# Patient Record
Sex: FEMALE | Race: BLACK OR AFRICAN AMERICAN | NOT HISPANIC OR LATINO | Employment: OTHER | URBAN - METROPOLITAN AREA
[De-identification: names, ages, dates, MRNs, and addresses within clinical notes are randomized per-mention and may not be internally consistent; named-entity substitution may affect disease eponyms.]

---

## 2017-09-06 ENCOUNTER — OFFICE VISIT (OUTPATIENT)
Dept: SURGERY | Facility: CLINIC | Age: 47
End: 2017-09-06
Payer: MEDICARE

## 2017-09-06 ENCOUNTER — HOSPITAL ENCOUNTER (OUTPATIENT)
Dept: RADIOLOGY | Facility: HOSPITAL | Age: 47
Discharge: HOME | End: 2017-09-06
Attending: NURSE PRACTITIONER
Payer: MEDICARE

## 2017-09-06 VITALS
BODY MASS INDEX: 46.26 KG/M2 | HEART RATE: 77 BPM | HEIGHT: 64 IN | HEIGHT: 64 IN | DIASTOLIC BLOOD PRESSURE: 74 MMHG | SYSTOLIC BLOOD PRESSURE: 116 MMHG | WEIGHT: 271 LBS | TEMPERATURE: 98 F | BODY MASS INDEX: 49 KG/M2 | WEIGHT: 287 LBS

## 2017-09-06 DIAGNOSIS — Z12.39 SCREENING BREAST EXAMINATION: Primary | ICD-10-CM

## 2017-09-06 DIAGNOSIS — Z12.31 OTHER SCREENING MAMMOGRAM: ICD-10-CM

## 2017-09-06 DIAGNOSIS — Z12.31 VISIT FOR SCREENING MAMMOGRAM: ICD-10-CM

## 2017-09-06 PROCEDURE — 99999 PR PBB SHADOW E&M-EST. PATIENT-LVL III: CPT | Mod: PBBFAC,,, | Performed by: NURSE PRACTITIONER

## 2017-09-06 PROCEDURE — 99213 OFFICE O/P EST LOW 20 MIN: CPT | Mod: PBBFAC,PO | Performed by: NURSE PRACTITIONER

## 2017-09-06 PROCEDURE — 77067 SCR MAMMO BI INCL CAD: CPT | Mod: TC

## 2017-09-06 PROCEDURE — 77063 BREAST TOMOSYNTHESIS BI: CPT | Mod: 26,,, | Performed by: RADIOLOGY

## 2017-09-06 PROCEDURE — 99213 OFFICE O/P EST LOW 20 MIN: CPT | Mod: S$PBB,,, | Performed by: NURSE PRACTITIONER

## 2017-09-06 PROCEDURE — 77067 SCR MAMMO BI INCL CAD: CPT | Mod: 26,,, | Performed by: RADIOLOGY

## 2017-09-06 NOTE — PROGRESS NOTES
Subjective:      Patient ID: Ava Driscoll is a 46 y.o. female.    Chief Complaint: Breast Cancer Screening (CBE)      HPI: (PF, EPF - 1-3) (Detailed, Comp, - 4)  patient presents today for breast cancer screening. Denies breast pain, mass, nipple discharge, skin changed. Refer to previous clinic note for history , was scheduled for core biopsy of the right breast on 3-19-15, no mass reproducible on US and vague density on mmg which was not clearly defined for core biopsy    Last mmg 8- with no abnormality reported     Review of Systems  Objective:   Physical Exam   Pulmonary/Chest: She exhibits no mass, no tenderness, no laceration, no edema, no deformity, no swelling and no retraction. Right breast exhibits no inverted nipple, no mass, no nipple discharge, no skin change and no tenderness. Left breast exhibits no inverted nipple, no mass, no nipple discharge, no skin change and no tenderness. Breasts are symmetrical. There is no breast swelling.   Breathing non-labored, no upper extremity lymphedema   Lymphadenopathy:     She has no cervical adenopathy.     She has no axillary adenopathy.        Right: No supraclavicular adenopathy present.        Left: No supraclavicular adenopathy present.     Assessment:       1. Screening breast examination        Plan:       mmg today, results pending, if no changes or abnormality she can return in one year with screening mmg, desires continued annual f/u here  Counseled today of modifiable breast cancer risk factors and obesity.   Call for any interval palpable breast mass, pain, nipple discharge, skin changes or other breast related concerns

## 2018-01-26 ENCOUNTER — HOSPITAL ENCOUNTER (EMERGENCY)
Facility: HOSPITAL | Age: 48
Discharge: HOME | End: 2018-01-27
Attending: EMERGENCY MEDICINE
Payer: MEDICARE

## 2018-01-26 DIAGNOSIS — M53.3 SACROILIAC PAIN: Primary | ICD-10-CM

## 2018-01-26 DIAGNOSIS — M25.552 PAIN IN LEFT HIP: ICD-10-CM

## 2018-01-26 PROCEDURE — 99284 EMERGENCY DEPT VISIT MOD MDM: CPT | Mod: 25

## 2018-01-26 PROCEDURE — 96372 THER/PROPH/DIAG INJ SC/IM: CPT

## 2018-01-27 VITALS
RESPIRATION RATE: 16 BRPM | TEMPERATURE: 98 F | SYSTOLIC BLOOD PRESSURE: 138 MMHG | BODY MASS INDEX: 49.34 KG/M2 | WEIGHT: 289 LBS | HEIGHT: 64 IN | DIASTOLIC BLOOD PRESSURE: 88 MMHG | HEART RATE: 88 BPM | OXYGEN SATURATION: 99 %

## 2018-01-27 PROCEDURE — 63600175 PHARM REV CODE 636 W HCPCS: Performed by: EMERGENCY MEDICINE

## 2018-01-27 RX ORDER — MELOXICAM 15 MG/1
15 TABLET ORAL DAILY
Qty: 10 TABLET | Refills: 0 | Status: SHIPPED | OUTPATIENT
Start: 2018-01-27 | End: 2018-02-06

## 2018-01-27 RX ORDER — KETOROLAC TROMETHAMINE 30 MG/ML
30 INJECTION, SOLUTION INTRAMUSCULAR; INTRAVENOUS
Status: COMPLETED | OUTPATIENT
Start: 2018-01-27 | End: 2018-01-27

## 2018-01-27 RX ORDER — TRAMADOL HYDROCHLORIDE 50 MG/1
50 TABLET ORAL EVERY 8 HOURS PRN
Qty: 15 TABLET | Refills: 0 | Status: SHIPPED | OUTPATIENT
Start: 2018-01-27 | End: 2018-01-30

## 2018-01-27 RX ORDER — METFORMIN HYDROCHLORIDE 1000 MG/1
1000 TABLET ORAL 2 TIMES DAILY WITH MEALS
COMMUNITY
End: 2018-11-26 | Stop reason: SDUPTHER

## 2018-01-27 RX ADMIN — KETOROLAC TROMETHAMINE 30 MG: 30 INJECTION, SOLUTION INTRAMUSCULAR at 12:01

## 2018-01-27 NOTE — ED PROVIDER NOTES
Encounter Date: 2018    SCRIBE #1 NOTE: I, Joel Leekory BAL, am scribing for, and in the presence of,  Aroldo Low MD. I have scribed the following portions of the note - Other sections scribed: HPI and ROS.       History     Chief Complaint   Patient presents with    Hip Pain     Pt c/o left hip pain, unable to bear weight onset 1wk, denies injury.      CC: Hip Pain     HPI: This 47 y.o. female with schizophrenia and NIDDM presents to the ED c/o acute onset, atraumatic, right sided hip pain x1 week. Pt reports she was moping the floor when all of a sudden she began to feel a twinge in her right hip. She reports her symptoms have progressively worsened. Pt reports pain is exacerbated with exertion and movement. No prior tx attempted. No alleviating factors. Pt denies numbness, weakness, and urinary symptoms.       The history is provided by the patient. No  was used.     Review of patient's allergies indicates:  No Known Allergies  Past Medical History:   Diagnosis Date    Behavioral problem     Diabetes mellitus     History of psychiatric care     History of psychiatric hospitalization     Psychiatric problem     Schizophrenia      Past Surgical History:   Procedure Laterality Date     SECTION      HYSTERECTOMY       Family History   Problem Relation Age of Onset    Breast cancer Neg Hx     Ovarian cancer Neg Hx      Social History   Substance Use Topics    Smoking status: Never Smoker    Smokeless tobacco: Never Used    Alcohol use No     Review of Systems   Constitutional: Negative for chills and fever.   HENT: Negative for congestion, ear pain, rhinorrhea and sore throat.    Eyes: Negative for pain and visual disturbance.   Respiratory: Negative for cough and shortness of breath.    Cardiovascular: Negative for chest pain.   Gastrointestinal: Negative for abdominal pain, diarrhea, nausea and vomiting.   Genitourinary: Negative for dysuria.   Musculoskeletal:  Negative for back pain and neck pain.        (+) right hip pain   Skin: Negative for rash.   Neurological: Negative for headaches.   All other systems reviewed and are negative.      Physical Exam     Initial Vitals [01/26/18 2243]   BP Pulse Resp Temp SpO2   124/76 92 16 97.8 °F (36.6 °C) 98 %      MAP       92         Physical Exam    Nursing note and vitals reviewed.  Constitutional: She appears well-developed and well-nourished.   HENT:   Head: Normocephalic and atraumatic.   Eyes: Conjunctivae are normal. No scleral icterus.   Neck: Normal range of motion. Neck supple.   Abdominal: Soft. She exhibits no distension. There is no tenderness.   Musculoskeletal: Normal range of motion. She exhibits no edema or tenderness.        Lumbar back: Normal.        Back:    Neurological: She is alert and oriented to person, place, and time. She has normal strength.   Skin: Skin is warm and dry.   Psychiatric: She has a normal mood and affect. Thought content normal.         ED Course   Procedures  Labs Reviewed - No data to display       X-Rays:   Independently Interpreted Readings:   Other Readings:  X-ray left hip:  No acute fracture or dislocation.  Soft tissues are unremarkable.    Medical Decision Making:   History:   I obtained history from: someone other than patient.  Old Medical Records: I decided to obtain old medical records.  Old Records Summarized: other records.  Initial Assessment:   47-year-old female with diabetes and schizophrenia presents the emergency department complaining of atraumatic left lower back/left hip pain for the past 7 days - see hpi for further details.     Differential Diagnosis:   Left hip strain, sacroiliitis, and reported trauma resulting in soft tissue contusion, ligamentous injury, fracture and/or dislocation.    Independently Interpreted Test(s):   I have ordered and independently interpreted X-rays - see prior notes.  Clinical Tests:   Lab Tests: Reviewed  Radiological Study:  Reviewed and Ordered  ED Management:  Physical exam reveals an uncomfortable but otherwise healthy-appearing middle-aged female with reproducible left sacroiliac tenderness.   although she walks with a limp, she is able to ambulate with minimal difficulty.  She is currently afebrile - VS reassuring.  X-rays left hip without evidence of acute fracture, dislocation and/or cortical irregularity.  She has been given IM Toradol for symptomatic relief.  Recommend continued outpatient supportive care including daily mobic ×10 days as well as Ultram if needed for break through pain.  Return instructions have been discussed prior to discharge.              Scribe Attestation:   Scribe #1: I performed the above scribed service and the documentation accurately describes the services I performed. I attest to the accuracy of the note.    Attending Attestation:           Physician Attestation for Scribe:  Physician Attestation Statement for Scribe #1: I, Aroldo Low MD, reviewed documentation, as scribed by Joel Mac II in my presence, and it is both accurate and complete.                 ED Course      Clinical Impression:   The primary encounter diagnosis was Sacroiliac pain. A diagnosis of Pain in left hip was also pertinent to this visit.    Disposition:   Disposition: Discharged                        Aroldo Lwo MD  01/27/18 0154

## 2018-04-10 ENCOUNTER — OFFICE VISIT (OUTPATIENT)
Dept: OBSTETRICS AND GYNECOLOGY | Facility: CLINIC | Age: 48
End: 2018-04-10
Payer: MEDICARE

## 2018-04-10 VITALS — BODY MASS INDEX: 48.49 KG/M2 | WEIGHT: 284 LBS | HEIGHT: 64 IN

## 2018-04-10 DIAGNOSIS — Z90.722 HISTORY OF TOTAL HYSTERECTOMY WITH BILATERAL SALPINGO-OOPHORECTOMY (BSO): ICD-10-CM

## 2018-04-10 DIAGNOSIS — Z90.710 HISTORY OF TOTAL HYSTERECTOMY WITH BILATERAL SALPINGO-OOPHORECTOMY (BSO): ICD-10-CM

## 2018-04-10 DIAGNOSIS — Z90.79 HISTORY OF TOTAL HYSTERECTOMY WITH BILATERAL SALPINGO-OOPHORECTOMY (BSO): ICD-10-CM

## 2018-04-10 DIAGNOSIS — Z01.419 ENCOUNTER FOR GYNECOLOGICAL EXAMINATION WITHOUT ABNORMAL FINDING: Primary | ICD-10-CM

## 2018-04-10 PROCEDURE — 99212 OFFICE O/P EST SF 10 MIN: CPT | Mod: PBBFAC | Performed by: OBSTETRICS & GYNECOLOGY

## 2018-04-10 PROCEDURE — 99999 PR PBB SHADOW E&M-EST. PATIENT-LVL II: CPT | Mod: PBBFAC,,, | Performed by: OBSTETRICS & GYNECOLOGY

## 2018-04-10 PROCEDURE — G0101 CA SCREEN;PELVIC/BREAST EXAM: HCPCS | Mod: PBBFAC | Performed by: OBSTETRICS & GYNECOLOGY

## 2018-04-10 PROCEDURE — G0101 CA SCREEN;PELVIC/BREAST EXAM: HCPCS | Mod: S$PBB,,, | Performed by: OBSTETRICS & GYNECOLOGY

## 2018-04-10 RX ORDER — PALIPERIDONE PALMITATE 156 MG/ML
INJECTION INTRAMUSCULAR
COMMUNITY
Start: 2018-04-03

## 2018-04-10 RX ORDER — PRAVASTATIN SODIUM 40 MG/1
40 TABLET ORAL DAILY
COMMUNITY
Start: 2018-03-05 | End: 2018-11-26 | Stop reason: SDUPTHER

## 2018-04-10 RX ORDER — TRAMADOL HYDROCHLORIDE 50 MG/1
50 TABLET ORAL EVERY 4 HOURS PRN
COMMUNITY
Start: 2018-01-31 | End: 2018-11-26

## 2018-04-10 NOTE — PROGRESS NOTES
Subjective:       Patient ID: Ava Driscoll is a 47 y.o. female.    Chief Complaint:  Gynecologic Exam      History of Present Illness  HPI  Annual Exam-Postmenopausal  Patient presents for annual exam. The patient has no complaints today. The patient is not currently sexually active for several years. GYN screening history: last pap: patient does not recall when last pap was and pt had total hysterectomy w/ BSO done by Dr. Garcia  for benign indications (bleeding and fibroids). and last mammogram: approximate date  and was normal. The patient is not taking hormone replacement therapy. Patient denies post-menopausal vaginal bleeding. The patient wears seatbelts: no. The patient participates in regular exercise: no. Has the patient ever been transfused or tattooed?: not asked. The patient reports that there is not domestic violence in her life.                             GYN & OB History  No LMP recorded. Patient has had a hysterectomy.   Date of Last Pap: No result found    OB History    Para Term  AB Living   2 2 2         SAB TAB Ectopic Multiple Live Births                  # Outcome Date GA Lbr Ian/2nd Weight Sex Delivery Anes PTL Lv   2 Term 01 40w0d    CS-LTranv      1 Term 06/10/99 40w0d    CS-LTranv             Review of Systems  Review of Systems   Constitutional: Negative.    Respiratory: Negative.    Cardiovascular: Negative.    Gastrointestinal: Negative.    Endocrine: Negative.    Genitourinary: Negative.    Musculoskeletal: Negative.    Hematological: Negative.    Psychiatric/Behavioral: Negative.    Breast: negative.            Objective:    Physical Exam:   Constitutional: She is oriented to person, place, and time. She appears well-developed and well-nourished. No distress.    HENT:   Head: Normocephalic and atraumatic.     Neck: Normal range of motion. No thyromegaly present.    Cardiovascular: Normal rate.     Pulmonary/Chest: Effort normal. No respiratory distress.  Right breast exhibits no inverted nipple, no mass, no nipple discharge, no skin change, no tenderness, presence, no bleeding and no swelling. Left breast exhibits no inverted nipple, no mass, no nipple discharge, no skin change, no tenderness, presence, no bleeding and no swelling.        Abdominal: Soft. She exhibits no distension. There is no tenderness.     Genitourinary: Vagina normal. Pelvic exam was performed with patient supine. There is no rash, tenderness, lesion or injury on the right labia. There is no rash, tenderness, lesion or injury on the left labia. Uterus is absent. There is an absent adnexa. Right adnexum displays no tenderness. Left adnexum displays no tenderness. Labial bartholins normal.Cervix exhibits absence.           Musculoskeletal: Normal range of motion and moves all extremeties. She exhibits no tenderness.       Neurological: She is alert and oriented to person, place, and time. No cranial nerve deficit. Coordination normal.    Skin: She is not diaphoretic.    Psychiatric: She has a normal mood and affect. Her behavior is normal. Judgment and thought content normal.          Assessment:        1. Encounter for gynecological examination without abnormal finding    2. History of total hysterectomy with bilateral salpingo-oophorectomy (BSO)               Plan:      1.  Orders for MMG already placed - pt to schedule at [facility]  2.  Pt is Medicare and to be seen every other year unless having problems

## 2018-07-17 ENCOUNTER — OFFICE VISIT (OUTPATIENT)
Dept: FAMILY MEDICINE | Facility: CLINIC | Age: 48
End: 2018-07-17
Payer: MEDICARE

## 2018-07-17 VITALS
DIASTOLIC BLOOD PRESSURE: 80 MMHG | WEIGHT: 288.38 LBS | SYSTOLIC BLOOD PRESSURE: 112 MMHG | TEMPERATURE: 99 F | OXYGEN SATURATION: 97 % | BODY MASS INDEX: 49.23 KG/M2 | HEIGHT: 64 IN | HEART RATE: 95 BPM

## 2018-07-17 DIAGNOSIS — F20.0 PARANOID SCHIZOPHRENIA: ICD-10-CM

## 2018-07-17 DIAGNOSIS — J30.9 ALLERGIC RHINITIS, UNSPECIFIED SEASONALITY, UNSPECIFIED TRIGGER: Primary | ICD-10-CM

## 2018-07-17 PROCEDURE — 99999 PR PBB SHADOW E&M-EST. PATIENT-LVL III: CPT | Mod: PBBFAC,,, | Performed by: INTERNAL MEDICINE

## 2018-07-17 PROCEDURE — 99204 OFFICE O/P NEW MOD 45 MIN: CPT | Mod: S$PBB,,, | Performed by: INTERNAL MEDICINE

## 2018-07-17 PROCEDURE — 99213 OFFICE O/P EST LOW 20 MIN: CPT | Mod: PBBFAC,PO | Performed by: INTERNAL MEDICINE

## 2018-07-17 RX ORDER — GLIMEPIRIDE 2 MG/1
2 TABLET ORAL EVERY MORNING
Refills: 0 | COMMUNITY
Start: 2018-05-18 | End: 2018-11-26 | Stop reason: SDUPTHER

## 2018-07-17 RX ORDER — LISINOPRIL 2.5 MG/1
TABLET ORAL
Refills: 0 | COMMUNITY
Start: 2018-05-18 | End: 2018-11-26 | Stop reason: SDUPTHER

## 2018-07-17 RX ORDER — PROMETHAZINE HYDROCHLORIDE AND DEXTROMETHORPHAN HYDROBROMIDE 6.25; 15 MG/5ML; MG/5ML
5 SYRUP ORAL EVERY 12 HOURS PRN
Qty: 180 ML | Refills: 0 | Status: SHIPPED | OUTPATIENT
Start: 2018-07-17 | End: 2018-07-27

## 2018-07-17 RX ORDER — ERGOCALCIFEROL 1.25 MG/1
50000 CAPSULE ORAL
Refills: 0 | COMMUNITY
Start: 2018-05-18 | End: 2018-11-26

## 2018-07-17 RX ORDER — OLOPATADINE HYDROCHLORIDE 2 MG/ML
SOLUTION/ DROPS OPHTHALMIC
Refills: 0 | COMMUNITY
Start: 2018-05-18 | End: 2019-10-23

## 2018-07-17 NOTE — PROGRESS NOTES
SUBJECTIVE     Chief Complaint   Patient presents with    Establish Care    Cough     ongoing x couple weeks.     Allergies       HPI  Ava Driscoll is a 47 y.o. female with multiple medical diagnoses as listed in the medical history and problem list that presents for evaluation of cough x 3-4 weeks. Pt reports a post-nasal drip and an alternating dry/productive cough. +runny/watery eyes and sneezing. Denies any SOB, wheezing, or chest tightness. Denies any fever, chills, or night sweats. Pt has not been taking any meds. Denies any sick contacts/recent travel.    PAST MEDICAL HISTORY:  Past Medical History:   Diagnosis Date    Behavioral problem     Diabetes mellitus     History of psychiatric care     History of psychiatric hospitalization     Psychiatric problem     Schizophrenia        PAST SURGICAL HISTORY:  Past Surgical History:   Procedure Laterality Date     SECTION      HYSTERECTOMY         SOCIAL HISTORY:  Social History     Social History    Marital status: Single     Spouse name: N/A    Number of children: N/A    Years of education: N/A     Occupational History    Not on file.     Social History Main Topics    Smoking status: Never Smoker    Smokeless tobacco: Never Used    Alcohol use No    Drug use: No    Sexual activity: No     Other Topics Concern    Not on file     Social History Narrative    No narrative on file       FAMILY HISTORY:  Family History   Problem Relation Age of Onset    Arthritis Mother     Diabetes type II Father     Breast cancer Neg Hx     Ovarian cancer Neg Hx        ALLERGIES AND MEDICATIONS: updated and reviewed.  Review of patient's allergies indicates:  No Known Allergies  Current Outpatient Prescriptions   Medication Sig Dispense Refill    glimepiride (AMARYL) 2 MG tablet Take 2 mg by mouth every morning.  0    INVEGA SUSTENNA 156 mg/mL Syrg injection       lisinopril (PRINIVIL,ZESTRIL) 2.5 MG tablet take 1 tablet by mouth every morning TO  "PRESERVE KIDNEY FUNCTION  0    metFORMIN (GLUCOPHAGE) 1000 MG tablet Take 1,000 mg by mouth 2 (two) times daily with meals.      olopatadine (PATADAY) 0.2 % Drop instill 1 drop into both eyes once daily  0    paliperidone (INVEGA) 3 MG TR24 Take 3 mg by mouth once daily.      pravastatin (PRAVACHOL) 40 MG tablet Take 40 mg by mouth once daily.       traMADol (ULTRAM) 50 mg tablet Take 50 mg by mouth every 4 (four) hours as needed.       VITAMIN D2 50,000 unit capsule Take 50,000 Units by mouth every 7 days.  0    promethazine-dextromethorphan (PROMETHAZINE-DM) 6.25-15 mg/5 mL Syrp Take 5 mLs by mouth every 12 (twelve) hours as needed. 180 mL 0     No current facility-administered medications for this visit.        ROS  Review of Systems   Constitutional: Negative for chills and fever.   HENT: Positive for sneezing. Negative for hearing loss and sore throat.    Eyes: Positive for discharge. Negative for visual disturbance.   Respiratory: Positive for cough. Negative for shortness of breath.    Cardiovascular: Negative for chest pain, palpitations and leg swelling.   Gastrointestinal: Negative for abdominal pain, constipation, diarrhea, nausea and vomiting.   Genitourinary: Negative for dysuria, frequency and urgency.   Musculoskeletal: Negative for arthralgias, joint swelling and myalgias.   Skin: Negative for rash and wound.   Neurological: Negative for headaches.   Psychiatric/Behavioral: Negative for agitation and confusion. The patient is not nervous/anxious.          OBJECTIVE     Physical Exam  Vitals:    07/17/18 1358   BP: 112/80   Pulse: 95   Temp: 98.5 °F (36.9 °C)    Body mass index is 49.5 kg/m².  Weight: 130.8 kg (288 lb 5.8 oz)   Height: 5' 4" (162.6 cm)     Physical Exam   Constitutional: She is oriented to person, place, and time. She appears well-developed and well-nourished. No distress.   HENT:   Head: Normocephalic and atraumatic.   Right Ear: External ear normal.   Left Ear: Hearing, " tympanic membrane and external ear normal.   Nose: Rhinorrhea present. Right sinus exhibits no maxillary sinus tenderness and no frontal sinus tenderness. Left sinus exhibits no maxillary sinus tenderness and no frontal sinus tenderness.   Mouth/Throat: Oropharynx is clear and moist. No uvula swelling. No posterior oropharyngeal edema or posterior oropharyngeal erythema.   R ear cerumen; can not visualize TM   Eyes: Conjunctivae and EOM are normal. Right eye exhibits no discharge. Left eye exhibits no discharge. No scleral icterus.   Neck: Normal range of motion. Neck supple. No JVD present. No tracheal deviation present.   Cardiovascular: Normal rate, regular rhythm and intact distal pulses.  Exam reveals no gallop and no friction rub.    No murmur heard.  Pulmonary/Chest: Effort normal and breath sounds normal. No respiratory distress. She has no wheezes.   Abdominal: Soft. Bowel sounds are normal. She exhibits no distension and no mass. There is no tenderness. There is no rebound and no guarding.   Musculoskeletal: Normal range of motion. She exhibits no edema, tenderness or deformity.   Neurological: She is alert and oriented to person, place, and time. She exhibits normal muscle tone. Coordination normal.   Skin: Skin is warm and dry. No rash noted. No erythema.   Psychiatric: She has a normal mood and affect. Her behavior is normal. Judgment and thought content normal.         Health Maintenance       Date Due Completion Date    Foot Exam 10/19/1980 ---    Eye Exam 10/19/1980 ---    TETANUS VACCINE 10/19/1988 ---    Pneumococcal PPSV23 (Medium Risk) (1) 10/19/1988 ---    Hemoglobin A1c 07/07/2013 1/7/2013    Lipid Panel 01/07/2014 1/7/2013    Influenza Vaccine 08/01/2018 ---    Low Dose Statin 07/17/2019 7/17/2018    Mammogram 09/06/2019 9/6/2017            ASSESSMENT     47 y.o. female with     1. Allergic rhinitis, unspecified seasonality, unspecified trigger    2. Paranoid schizophrenia    3. BMI 45.0-49.9,  adult        PLAN:     1. Allergic rhinitis, unspecified seasonality, unspecified trigger  - Continue symptomatic treatment with rest, increase fluid intake, tylenol or ibuprofen PRN fever(temp >/= 100.4) or body aches. Okay to take OTC antihistamines, i.e. Bendaryl, Claritin, Allegra, etc. as needed.  - Okay to gargle with warm, salt water or use throat lozenges as needed  - promethazine-dextromethorphan (PROMETHAZINE-DM) 6.25-15 mg/5 mL Syrp; Take 5 mLs by mouth every 12 (twelve) hours as needed.  Dispense: 180 mL; Refill: 0    2. Paranoid schizophrenia  - Stable; no acute issues  - Continue management per Psych    3. BMI 45.0-49.9, adult  - Pt aware of importance of eating a prudent diet and exercising        RTC in 2 weeks as needed for any acute worsening of current condition or failure to improve; pt requested me as her PCP today and I accepted; ROSIE completed today for former PCP-Dr. Barker and Ophthalmology-Dr. Sreekanth Murphy MD  07/17/2018 2:18 PM        No Follow-up on file.

## 2018-07-26 DIAGNOSIS — E11.9 TYPE 2 DIABETES MELLITUS WITHOUT COMPLICATION: ICD-10-CM

## 2018-08-13 ENCOUNTER — LAB VISIT (OUTPATIENT)
Dept: LAB | Facility: HOSPITAL | Age: 48
End: 2018-08-13
Attending: INTERNAL MEDICINE
Payer: MEDICARE

## 2018-08-13 DIAGNOSIS — E11.9 TYPE 2 DIABETES MELLITUS WITHOUT COMPLICATION: ICD-10-CM

## 2018-08-13 LAB
CHOLEST SERPL-MCNC: 139 MG/DL
CHOLEST/HDLC SERPL: 3.8 {RATIO}
HDLC SERPL-MCNC: 37 MG/DL
HDLC SERPL: 26.6 %
LDLC SERPL CALC-MCNC: 84.4 MG/DL
NONHDLC SERPL-MCNC: 102 MG/DL
TRIGL SERPL-MCNC: 88 MG/DL

## 2018-08-13 PROCEDURE — 80061 LIPID PANEL: CPT

## 2018-08-13 PROCEDURE — 36415 COLL VENOUS BLD VENIPUNCTURE: CPT

## 2018-08-13 PROCEDURE — 83036 HEMOGLOBIN GLYCOSYLATED A1C: CPT

## 2018-08-14 LAB
ESTIMATED AVG GLUCOSE: 148 MG/DL
HBA1C MFR BLD HPLC: 6.8 %

## 2018-09-12 ENCOUNTER — HOSPITAL ENCOUNTER (OUTPATIENT)
Dept: RADIOLOGY | Facility: HOSPITAL | Age: 48
Discharge: HOME | End: 2018-09-12
Attending: NURSE PRACTITIONER
Payer: MEDICARE

## 2018-09-12 DIAGNOSIS — Z12.31 SCREENING MAMMOGRAM, ENCOUNTER FOR: ICD-10-CM

## 2018-09-12 PROCEDURE — 77067 SCR MAMMO BI INCL CAD: CPT | Mod: 26,,, | Performed by: RADIOLOGY

## 2018-09-12 PROCEDURE — 77063 BREAST TOMOSYNTHESIS BI: CPT | Mod: TC

## 2018-09-12 PROCEDURE — 77063 BREAST TOMOSYNTHESIS BI: CPT | Mod: 26,,, | Performed by: RADIOLOGY

## 2018-09-14 ENCOUNTER — TELEPHONE (OUTPATIENT)
Dept: SURGERY | Facility: CLINIC | Age: 48
End: 2018-09-14

## 2018-09-14 NOTE — TELEPHONE ENCOUNTER
Contacted the patient regarding scheduling breast exam appointment with Jennifer Vila NP.  The patient is scheduled to be seen on Wednesday 10/10/18 at 11 am.  The patient voiced understanding of appointment date and time.  Reminder letter mailed to the patient.

## 2018-10-10 ENCOUNTER — OFFICE VISIT (OUTPATIENT)
Dept: SURGERY | Facility: CLINIC | Age: 48
End: 2018-10-10
Payer: MEDICARE

## 2018-10-10 VITALS
WEIGHT: 287.25 LBS | TEMPERATURE: 99 F | BODY MASS INDEX: 49.04 KG/M2 | HEIGHT: 64 IN | HEART RATE: 95 BPM | SYSTOLIC BLOOD PRESSURE: 122 MMHG | DIASTOLIC BLOOD PRESSURE: 68 MMHG

## 2018-10-10 DIAGNOSIS — Z12.39 SCREENING BREAST EXAMINATION: Primary | ICD-10-CM

## 2018-10-10 PROCEDURE — 99213 OFFICE O/P EST LOW 20 MIN: CPT | Mod: PBBFAC,PO | Performed by: NURSE PRACTITIONER

## 2018-10-10 PROCEDURE — 99212 OFFICE O/P EST SF 10 MIN: CPT | Mod: S$PBB,,, | Performed by: NURSE PRACTITIONER

## 2018-10-10 PROCEDURE — 99999 PR PBB SHADOW E&M-EST. PATIENT-LVL III: CPT | Mod: PBBFAC,,, | Performed by: NURSE PRACTITIONER

## 2018-10-10 NOTE — PROGRESS NOTES
Subjective:      Patient ID: Ava Driscoll is a 47 y.o. female.    Chief Complaint: Breast Cancer Screening (CBE)      HPI: (PF, EPF - 1-3) (Detailed, Comp, - 4)patient presents today for breast cancer screening. Denies breast pain, mass, nipple discharge, skin changed.      Last mmg 9- with no abnormality reported       Review of Systems  Objective:   Physical Exam   Pulmonary/Chest: Right breast exhibits no inverted nipple, no mass, no nipple discharge, no skin change and no tenderness. Left breast exhibits no inverted nipple, no mass, no nipple discharge, no skin change and no tenderness. Breasts are symmetrical. There is no breast swelling.   Lymphadenopathy:     She has no cervical adenopathy.     She has no axillary adenopathy.        Right: No supraclavicular adenopathy present.        Left: No supraclavicular adenopathy present.     Assessment:       1. Screening breast examination        Plan:       Clinically TAYA  Patient desires to return here for annual screening  Return in one year with screening mmg, can see Gopal Maxwell at that time

## 2018-10-12 ENCOUNTER — TELEPHONE (OUTPATIENT)
Dept: ADMINISTRATIVE | Facility: HOSPITAL | Age: 48
End: 2018-10-12

## 2018-11-26 ENCOUNTER — OFFICE VISIT (OUTPATIENT)
Dept: FAMILY MEDICINE | Facility: CLINIC | Age: 48
End: 2018-11-26
Payer: MEDICARE

## 2018-11-26 VITALS
DIASTOLIC BLOOD PRESSURE: 86 MMHG | HEIGHT: 64 IN | SYSTOLIC BLOOD PRESSURE: 120 MMHG | BODY MASS INDEX: 49.38 KG/M2 | WEIGHT: 289.25 LBS | HEART RATE: 87 BPM | OXYGEN SATURATION: 96 % | TEMPERATURE: 98 F | RESPIRATION RATE: 16 BRPM

## 2018-11-26 DIAGNOSIS — E78.5 HYPERLIPIDEMIA LDL GOAL <100: ICD-10-CM

## 2018-11-26 DIAGNOSIS — E11.9 TYPE 2 DIABETES MELLITUS WITHOUT COMPLICATION, WITHOUT LONG-TERM CURRENT USE OF INSULIN: Primary | ICD-10-CM

## 2018-11-26 DIAGNOSIS — R09.82 PND (POST-NASAL DRIP): ICD-10-CM

## 2018-11-26 PROCEDURE — 99214 OFFICE O/P EST MOD 30 MIN: CPT | Mod: PBBFAC,PO | Performed by: PHYSICIAN ASSISTANT

## 2018-11-26 PROCEDURE — 99999 PR PBB SHADOW E&M-EST. PATIENT-LVL IV: CPT | Mod: PBBFAC,,, | Performed by: PHYSICIAN ASSISTANT

## 2018-11-26 PROCEDURE — 99213 OFFICE O/P EST LOW 20 MIN: CPT | Mod: S$PBB,,, | Performed by: PHYSICIAN ASSISTANT

## 2018-11-26 RX ORDER — LISINOPRIL 2.5 MG/1
TABLET ORAL
Qty: 90 TABLET | Refills: 1 | Status: SHIPPED | OUTPATIENT
Start: 2018-11-26 | End: 2019-03-20

## 2018-11-26 RX ORDER — METFORMIN HYDROCHLORIDE 1000 MG/1
1000 TABLET ORAL 2 TIMES DAILY WITH MEALS
Qty: 180 TABLET | Refills: 1 | Status: SHIPPED | OUTPATIENT
Start: 2018-11-26 | End: 2019-05-15 | Stop reason: SDUPTHER

## 2018-11-26 RX ORDER — BENZONATATE 100 MG/1
100 CAPSULE ORAL 3 TIMES DAILY PRN
Qty: 30 CAPSULE | Refills: 0 | Status: SHIPPED | OUTPATIENT
Start: 2018-11-26 | End: 2018-12-26

## 2018-11-26 RX ORDER — GLIMEPIRIDE 2 MG/1
2 TABLET ORAL EVERY MORNING
Qty: 90 TABLET | Refills: 1 | Status: SHIPPED | OUTPATIENT
Start: 2018-11-26 | End: 2019-05-15 | Stop reason: SDUPTHER

## 2018-11-26 RX ORDER — INSULIN PUMP SYRINGE, 3 ML
EACH MISCELLANEOUS
Qty: 1 EACH | Refills: 0 | Status: SHIPPED | OUTPATIENT
Start: 2018-11-26 | End: 2019-01-23 | Stop reason: SDUPTHER

## 2018-11-26 RX ORDER — LANCETS
EACH MISCELLANEOUS
Qty: 100 EACH | Refills: 3 | Status: SHIPPED | OUTPATIENT
Start: 2018-11-26 | End: 2019-01-23 | Stop reason: SDUPTHER

## 2018-11-26 RX ORDER — PRAVASTATIN SODIUM 40 MG/1
40 TABLET ORAL DAILY
Qty: 90 TABLET | Refills: 1 | Status: SHIPPED | OUTPATIENT
Start: 2018-11-26 | End: 2019-05-15 | Stop reason: SDUPTHER

## 2018-11-26 NOTE — PROGRESS NOTES
Subjective:       Patient ID: Ava Driscoll is a 48 y.o. female with multiple medical diagnoses as listed in the medical history and problem list that presents for Diabetes  .    Chief Complaint: Diabetes      Diabetes   She presents for her follow-up diabetic visit. She has type 2 diabetes mellitus. Onset time: dx in her 40s  There are no hypoglycemic associated symptoms. Associated symptoms include polydipsia. Pertinent negatives for diabetes include no fatigue, no foot paresthesias and no polyuria. There are no hypoglycemic complications. Pertinent negatives for diabetic complications include no heart disease, nephropathy or peripheral neuropathy. Current diabetic treatments: amaryl 2 mg and metformin 1000 BID. She has not had a previous visit with a dietitian. She never participates in exercise. An ACE inhibitor/angiotensin II receptor blocker is being taken. She does not see a podiatrist.Eye exam is not current (appt tomorrow ).     Review of Systems   Constitutional: Negative for fatigue.   Endocrine: Positive for polydipsia. Negative for polyuria.         PAST MEDICAL HISTORY:  Past Medical History:   Diagnosis Date    Behavioral problem     Diabetes mellitus     History of psychiatric care     History of psychiatric hospitalization     Psychiatric problem     Schizophrenia        SOCIAL HISTORY:  Social History     Socioeconomic History    Marital status: Single     Spouse name: Not on file    Number of children: Not on file    Years of education: Not on file    Highest education level: Not on file   Social Needs    Financial resource strain: Not on file    Food insecurity - worry: Not on file    Food insecurity - inability: Not on file    Transportation needs - medical: Not on file    Transportation needs - non-medical: Not on file   Occupational History    Not on file   Tobacco Use    Smoking status: Never Smoker    Smokeless tobacco: Never Used   Substance and Sexual Activity    Alcohol  "use: No    Drug use: No    Sexual activity: No   Other Topics Concern    Patient feels they ought to cut down on drinking/drug use Not Asked    Patient annoyed by others criticizing their drinking/drug use Not Asked    Patient has felt bad or guilty about drinking/drug use Not Asked    Patient has had a drink/used drugs as an eye opener in the AM Not Asked   Social History Narrative    Not on file       ALLERGIES AND MEDICATIONS: updated and reviewed.  Review of patient's allergies indicates:  No Known Allergies  Current Outpatient Medications   Medication Sig Dispense Refill    glimepiride (AMARYL) 2 MG tablet Take 1 tablet (2 mg total) by mouth every morning. 90 tablet 1    INVEGA SUSTENNA 156 mg/mL Syrg injection       lisinopril (PRINIVIL,ZESTRIL) 2.5 MG tablet take 1 tablet by mouth every morning TO PRESERVE KIDNEY FUNCTION 90 tablet 1    metFORMIN (GLUCOPHAGE) 1000 MG tablet Take 1 tablet (1,000 mg total) by mouth 2 (two) times daily with meals. 180 tablet 1    pravastatin (PRAVACHOL) 40 MG tablet Take 1 tablet (40 mg total) by mouth once daily. 90 tablet 1    benzonatate (TESSALON) 100 MG capsule Take 1 capsule (100 mg total) by mouth 3 (three) times daily as needed for Cough. 30 capsule 0    blood sugar diagnostic Strp Check BS once a day. 100 each 3    blood-glucose meter kit Check BS once a day. 1 each 0    lancets (LANCETS,ULTRA THIN) Misc Check BS once a day. 100 each 3    olopatadine (PATADAY) 0.2 % Drop instill 1 drop into both eyes once daily  0     No current facility-administered medications for this visit.          Objective:   /86   Pulse 87   Temp 98.3 °F (36.8 °C) (Oral)   Resp 16   Ht 5' 4" (1.626 m)   Wt 131.2 kg (289 lb 3.9 oz)   SpO2 96%   BMI 49.65 kg/m²      Physical Exam   Constitutional: She is oriented to person, place, and time. No distress.   HENT:   Head: Normocephalic and atraumatic.   Eyes: Conjunctivae and EOM are normal.   Cardiovascular: Normal rate " and regular rhythm.   Pulmonary/Chest: Effort normal and breath sounds normal.   Neurological: She is alert and oriented to person, place, and time.   Skin: Skin is warm. No erythema.           Protective Sensation (w/ 10 gram monofilament):  Right: Intact  Left: Intact    Visual Inspection:  Callus -  Bilateral and Dry Skin -  Bilateral    Pedal Pulses:   Right: Present  Left: Present    Posterior tibialis:   Right:Present  Left: Present        Assessment:       1. Type 2 diabetes mellitus without complication, without long-term current use of insulin    2. Hyperlipidemia LDL goal <100    3. PND (post-nasal drip)        Plan:       Type 2 diabetes mellitus without complication, without long-term current use of insulin  -     lancets (LANCETS,ULTRA THIN) Misc; Check BS once a day.  Dispense: 100 each; Refill: 3  -     blood-glucose meter kit; Check BS once a day.  Dispense: 1 each; Refill: 0  -     blood sugar diagnostic Strp; Check BS once a day.  Dispense: 100 each; Refill: 3  -     glimepiride (AMARYL) 2 MG tablet; Take 1 tablet (2 mg total) by mouth every morning.  Dispense: 90 tablet; Refill: 1  -     metFORMIN (GLUCOPHAGE) 1000 MG tablet; Take 1 tablet (1,000 mg total) by mouth 2 (two) times daily with meals.  Dispense: 180 tablet; Refill: 1  -     lisinopril (PRINIVIL,ZESTRIL) 2.5 MG tablet; take 1 tablet by mouth every morning TO PRESERVE KIDNEY FUNCTION  Dispense: 90 tablet; Refill: 1  doing well   Follow up 6 months     Hyperlipidemia LDL goal <100  -     pravastatin (PRAVACHOL) 40 MG tablet; Take 1 tablet (40 mg total) by mouth once daily.  Dispense: 90 tablet; Refill: 1    PND (post-nasal drip)  cetrizine or loratadine     Other orders  -     benzonatate (TESSALON) 100 MG capsule; Take 1 capsule (100 mg total) by mouth 3 (three) times daily as needed for Cough.  Dispense: 30 capsule; Refill: 0            No Follow-up on file.

## 2018-12-03 ENCOUNTER — TELEPHONE (OUTPATIENT)
Dept: ADMINISTRATIVE | Facility: HOSPITAL | Age: 48
End: 2018-12-03

## 2019-01-23 ENCOUNTER — TELEPHONE (OUTPATIENT)
Dept: FAMILY MEDICINE | Facility: CLINIC | Age: 49
End: 2019-01-23

## 2019-01-23 DIAGNOSIS — E11.9 TYPE 2 DIABETES MELLITUS WITHOUT COMPLICATION, WITHOUT LONG-TERM CURRENT USE OF INSULIN: ICD-10-CM

## 2019-01-23 RX ORDER — INSULIN PUMP SYRINGE, 3 ML
EACH MISCELLANEOUS
Qty: 1 EACH | Refills: 0 | Status: SHIPPED | OUTPATIENT
Start: 2019-01-23 | End: 2020-04-28 | Stop reason: SDUPTHER

## 2019-01-23 RX ORDER — LANCETS
EACH MISCELLANEOUS
Qty: 200 EACH | Refills: 3 | Status: SHIPPED | OUTPATIENT
Start: 2019-01-23 | End: 2020-04-28 | Stop reason: SDUPTHER

## 2019-01-23 NOTE — TELEPHONE ENCOUNTER
----- Message from Kiesha Shay sent at 1/23/2019  2:21 PM CST -----  Contact: Alyce wynn [facility] - [phone]  Rep requesting prescription for  Diabetic strip, lancets, and Machine.

## 2019-03-20 ENCOUNTER — OFFICE VISIT (OUTPATIENT)
Dept: FAMILY MEDICINE | Facility: CLINIC | Age: 49
End: 2019-03-20
Payer: MEDICARE

## 2019-03-20 ENCOUNTER — LAB VISIT (OUTPATIENT)
Dept: LAB | Facility: HOSPITAL | Age: 49
End: 2019-03-20
Attending: INTERNAL MEDICINE
Payer: MEDICARE

## 2019-03-20 VITALS
SYSTOLIC BLOOD PRESSURE: 122 MMHG | WEIGHT: 291.25 LBS | TEMPERATURE: 99 F | HEART RATE: 95 BPM | BODY MASS INDEX: 49.72 KG/M2 | HEIGHT: 64 IN | DIASTOLIC BLOOD PRESSURE: 78 MMHG | RESPIRATION RATE: 18 BRPM | OXYGEN SATURATION: 97 %

## 2019-03-20 DIAGNOSIS — R05.8 COUGH DUE TO ACE INHIBITOR: Primary | ICD-10-CM

## 2019-03-20 DIAGNOSIS — E11.65 TYPE 2 DIABETES MELLITUS WITH HYPERGLYCEMIA, WITHOUT LONG-TERM CURRENT USE OF INSULIN: ICD-10-CM

## 2019-03-20 DIAGNOSIS — T46.4X5A COUGH DUE TO ACE INHIBITOR: Primary | ICD-10-CM

## 2019-03-20 DIAGNOSIS — F20.0 PARANOID SCHIZOPHRENIA: ICD-10-CM

## 2019-03-20 LAB
BASOPHILS # BLD AUTO: 0.02 K/UL
BASOPHILS NFR BLD: 0.2 %
DIFFERENTIAL METHOD: ABNORMAL
EOSINOPHIL # BLD AUTO: 0.2 K/UL
EOSINOPHIL NFR BLD: 1.6 %
ERYTHROCYTE [DISTWIDTH] IN BLOOD BY AUTOMATED COUNT: 15.6 %
HCT VFR BLD AUTO: 39.5 %
HGB BLD-MCNC: 12 G/DL
LYMPHOCYTES # BLD AUTO: 3 K/UL
LYMPHOCYTES NFR BLD: 29.8 %
MCH RBC QN AUTO: 27.7 PG
MCHC RBC AUTO-ENTMCNC: 30.4 G/DL
MCV RBC AUTO: 91 FL
MONOCYTES # BLD AUTO: 0.6 K/UL
MONOCYTES NFR BLD: 5.7 %
NEUTROPHILS # BLD AUTO: 6.4 K/UL
NEUTROPHILS NFR BLD: 62.7 %
PLATELET # BLD AUTO: 377 K/UL
PMV BLD AUTO: 9 FL
PTH-INTACT SERPL-MCNC: 114.6 PG/ML
RBC # BLD AUTO: 4.33 M/UL
WBC # BLD AUTO: 10.16 K/UL

## 2019-03-20 PROCEDURE — 99214 PR OFFICE/OUTPT VISIT, EST, LEVL IV, 30-39 MIN: ICD-10-PCS | Mod: S$PBB,,, | Performed by: INTERNAL MEDICINE

## 2019-03-20 PROCEDURE — 85025 COMPLETE CBC W/AUTO DIFF WBC: CPT

## 2019-03-20 PROCEDURE — 99213 OFFICE O/P EST LOW 20 MIN: CPT | Mod: PBBFAC,PO | Performed by: INTERNAL MEDICINE

## 2019-03-20 PROCEDURE — 36415 COLL VENOUS BLD VENIPUNCTURE: CPT | Mod: PO

## 2019-03-20 PROCEDURE — 99999 PR PBB SHADOW E&M-EST. PATIENT-LVL III: CPT | Mod: PBBFAC,,, | Performed by: INTERNAL MEDICINE

## 2019-03-20 PROCEDURE — 82306 VITAMIN D 25 HYDROXY: CPT

## 2019-03-20 PROCEDURE — 99999 PR PBB SHADOW E&M-EST. PATIENT-LVL III: ICD-10-PCS | Mod: PBBFAC,,, | Performed by: INTERNAL MEDICINE

## 2019-03-20 PROCEDURE — 83970 ASSAY OF PARATHORMONE: CPT

## 2019-03-20 PROCEDURE — 99214 OFFICE O/P EST MOD 30 MIN: CPT | Mod: S$PBB,,, | Performed by: INTERNAL MEDICINE

## 2019-03-20 RX ORDER — LOSARTAN POTASSIUM 25 MG/1
12.5 TABLET ORAL DAILY
Qty: 90 TABLET | Refills: 0 | Status: SHIPPED | OUTPATIENT
Start: 2019-03-20 | End: 2019-04-25 | Stop reason: SDUPTHER

## 2019-03-20 NOTE — PROGRESS NOTES
SUBJECTIVE     Chief Complaint   Patient presents with    Medication Problem       HPI  Ava Driscoll is a 48 y.o. female with multiple medical diagnoses as listed in the medical history and problem list that presents for follow-up for persistent dry cough x 8 months. Pt saw that cough was a side effect of the Lisinopril she has been on for quite some time, but tried to put with it. She took her last dosage of Lisinopril yesterday, but reports she can no longer tolerate the cough and request to change to Losartan. Pt is without any other complaints today.     PAST MEDICAL HISTORY:  Past Medical History:   Diagnosis Date    Behavioral problem     Diabetes mellitus     History of psychiatric care     History of psychiatric hospitalization     Psychiatric problem     Schizophrenia        PAST SURGICAL HISTORY:  Past Surgical History:   Procedure Laterality Date     SECTION      HYSTERECTOMY         SOCIAL HISTORY:  Social History     Socioeconomic History    Marital status: Single     Spouse name: Not on file    Number of children: Not on file    Years of education: Not on file    Highest education level: Not on file   Social Needs    Financial resource strain: Not on file    Food insecurity - worry: Not on file    Food insecurity - inability: Not on file    Transportation needs - medical: Not on file    Transportation needs - non-medical: Not on file   Occupational History    Not on file   Tobacco Use    Smoking status: Never Smoker    Smokeless tobacco: Never Used   Substance and Sexual Activity    Alcohol use: No    Drug use: No    Sexual activity: No   Other Topics Concern    Patient feels they ought to cut down on drinking/drug use Not Asked    Patient annoyed by others criticizing their drinking/drug use Not Asked    Patient has felt bad or guilty about drinking/drug use Not Asked    Patient has had a drink/used drugs as an eye opener in the AM Not Asked   Social History  Narrative    Not on file       FAMILY HISTORY:  Family History   Problem Relation Age of Onset    Arthritis Mother     Diabetes type II Father     Breast cancer Maternal Aunt 80    Ovarian cancer Neg Hx        ALLERGIES AND MEDICATIONS: updated and reviewed.  Review of patient's allergies indicates:   Allergen Reactions    Ace inhibitors      Cough     Current Outpatient Medications   Medication Sig Dispense Refill    blood sugar diagnostic Strp Check BS once a day. 200 each 3    blood-glucose meter kit Check BS once a day. 1 each 0    glimepiride (AMARYL) 2 MG tablet Take 1 tablet (2 mg total) by mouth every morning. 90 tablet 1    INVEGA SUSTENNA 156 mg/mL Syrg injection       lancets (LANCETS,ULTRA THIN) Misc Check BS once a day. 200 each 3    metFORMIN (GLUCOPHAGE) 1000 MG tablet Take 1 tablet (1,000 mg total) by mouth 2 (two) times daily with meals. 180 tablet 1    olopatadine (PATADAY) 0.2 % Drop instill 1 drop into both eyes once daily  0    pravastatin (PRAVACHOL) 40 MG tablet Take 1 tablet (40 mg total) by mouth once daily. 90 tablet 1    losartan (COZAAR) 25 MG tablet Take 0.5 tablets (12.5 mg total) by mouth once daily. 90 tablet 0     No current facility-administered medications for this visit.        ROS  Review of Systems   Constitutional: Negative for chills and fever.   HENT: Negative for hearing loss and sore throat.    Eyes: Negative for visual disturbance.   Respiratory: Negative for cough and shortness of breath.    Cardiovascular: Negative for chest pain, palpitations and leg swelling.   Gastrointestinal: Negative for abdominal pain, constipation, diarrhea, nausea and vomiting.   Genitourinary: Negative for dysuria, frequency and urgency.   Musculoskeletal: Negative for arthralgias, joint swelling and myalgias.   Skin: Negative for rash and wound.   Neurological: Negative for headaches.   Psychiatric/Behavioral: Negative for agitation and confusion. The patient is not  "nervous/anxious.          OBJECTIVE     Physical Exam  Vitals:    03/20/19 1018   BP: 122/78   Pulse: 95   Resp: 18   Temp: 98.6 °F (37 °C)    Body mass index is 49.99 kg/m².  Weight: 132.1 kg (291 lb 3.6 oz)   Height: 5' 4" (162.6 cm)     Physical Exam   Constitutional: She is oriented to person, place, and time. She appears well-developed and well-nourished. No distress.   HENT:   Head: Normocephalic and atraumatic.   Right Ear: External ear normal.   Left Ear: External ear normal.   Nose: Nose normal.   Mouth/Throat: Oropharynx is clear and moist.   Eyes: Conjunctivae and EOM are normal. Right eye exhibits no discharge. Left eye exhibits no discharge. No scleral icterus.   Neck: Normal range of motion. Neck supple. No JVD present. No tracheal deviation present.   Cardiovascular: Normal rate, regular rhythm and intact distal pulses. Exam reveals no gallop and no friction rub.   No murmur heard.  Pulmonary/Chest: Effort normal and breath sounds normal. No respiratory distress. She has no wheezes.   Abdominal: Soft. Bowel sounds are normal. She exhibits no distension and no mass. There is no tenderness. There is no rebound and no guarding.   Musculoskeletal: Normal range of motion. She exhibits no edema, tenderness or deformity.   Neurological: She is alert and oriented to person, place, and time. She exhibits normal muscle tone. Coordination normal.   Skin: Skin is warm and dry. No rash noted. No erythema.   Psychiatric: She has a normal mood and affect. Her behavior is normal. Judgment and thought content normal.         Health Maintenance       Date Due Completion Date    TETANUS VACCINE 10/19/1988 ---    Pneumococcal Vaccine (Medium Risk) (1 of 1 - PPSV23) 10/19/1989 ---    Influenza Vaccine 08/01/2018 ---    Hemoglobin A1c 02/13/2019 8/13/2018    Lipid Panel 08/13/2019 8/13/2018    Eye Exam 09/27/2019 9/27/2018    Low Dose Statin 11/26/2019 11/26/2018    Foot Exam 11/26/2019 11/26/2018    Override on " 11/26/2018: Done    Mammogram 09/12/2020 9/12/2018            ASSESSMENT     48 y.o. female with     1. Cough due to ACE inhibitor    2. Type 2 diabetes mellitus with hyperglycemia, without long-term current use of insulin    3. Paranoid schizophrenia    4. BMI 45.0-49.9, adult        PLAN:     1. Cough due to ACE inhibitor  - Will discontinue Lisinopril and place in allergies as an adverse reaction so as to avoid in the future    2. Type 2 diabetes mellitus with hyperglycemia, without long-term current use of insulin  - Stable; no acute issues  - Check labs today and continue current meds  - losartan (COZAAR) 25 MG tablet; Take 0.5 tablets (12.5 mg total) by mouth once daily.  Dispense: 90 tablet; Refill: 0  - CBC auto differential; Future  - Vitamin D; Future  - PTH, intact; Future  - Microalbumin/creatinine urine ratio    3. Paranoid schizophrenia  - Stable; no acute issues  - Continue management per [facility]    4. BMI 45.0-49.9, adult  - Pt aware of importance of eating a prudent diet and exercising  - Vitamin D; Future        RTC in 6 months     Keerthi Murphy MD  03/20/2019 10:28 AM        No Follow-up on file.

## 2019-03-21 DIAGNOSIS — N25.81 SECONDARY HYPERPARATHYROIDISM: ICD-10-CM

## 2019-03-21 DIAGNOSIS — E55.9 VITAMIN D DEFICIENCY: ICD-10-CM

## 2019-03-21 LAB — 25(OH)D3+25(OH)D2 SERPL-MCNC: 10 NG/ML

## 2019-03-21 RX ORDER — ERGOCALCIFEROL 1.25 MG/1
50000 CAPSULE ORAL
Qty: 12 CAPSULE | Refills: 1 | Status: SHIPPED | OUTPATIENT
Start: 2019-03-21 | End: 2019-03-21 | Stop reason: SDUPTHER

## 2019-03-21 RX ORDER — ERGOCALCIFEROL 1.25 MG/1
50000 CAPSULE ORAL
Qty: 12 CAPSULE | Refills: 1 | Status: SHIPPED | OUTPATIENT
Start: 2019-03-21 | End: 2019-04-20

## 2019-03-21 NOTE — TELEPHONE ENCOUNTER
----- Message from Misa Christy sent at 3/21/2019 12:51 PM CDT -----  Contact: Self   Pt would like her meds to be sent to [facility] [place] [address] [facility] [phone]

## 2019-03-25 ENCOUNTER — TELEPHONE (OUTPATIENT)
Dept: FAMILY MEDICINE | Facility: CLINIC | Age: 49
End: 2019-03-25

## 2019-03-25 NOTE — TELEPHONE ENCOUNTER
Patient advised that medications - losartan and vitamin D2 has been sent to [facility].  Patient verbalized understanding.     Patient will call back if needed.

## 2019-03-25 NOTE — TELEPHONE ENCOUNTER
Spoke with pt and she states that she got a call from the Pharmacy and her medication is ready. No other concerns voiced at this time.

## 2019-03-25 NOTE — TELEPHONE ENCOUNTER
----- Message from Maryanne Crain sent at 3/25/2019 12:08 PM CDT -----  Contact: pt  States her medications are at the wrong pharmacy. Pt uses     [facility] - [address] [address]  Phone: [phone] Fax: [phone]    Please call pt at [phone]. Thank you

## 2019-04-25 DIAGNOSIS — E11.65 TYPE 2 DIABETES MELLITUS WITH HYPERGLYCEMIA, WITHOUT LONG-TERM CURRENT USE OF INSULIN: ICD-10-CM

## 2019-04-25 RX ORDER — LOSARTAN POTASSIUM 25 MG/1
12.5 TABLET ORAL DAILY
Qty: 90 TABLET | Refills: 0 | Status: SHIPPED | OUTPATIENT
Start: 2019-04-25 | End: 2019-05-03 | Stop reason: SDUPTHER

## 2019-04-25 NOTE — TELEPHONE ENCOUNTER
----- Message from Vero Wick sent at 4/25/2019  2:50 PM CDT -----  Contact: [phone]  Type: Patient Call Back    Who called: pt    What is the request in detail: Pt is requesting to speak to you regarding her losartan that you had put her on, she was suppose to take a 1/2 pill a day, she had been taking 1 whole pill. Pls call pt [phone].     Best call back number: [phone]    Thanks.......BRODY

## 2019-04-25 NOTE — TELEPHONE ENCOUNTER
Patient requesting a refill of medication.  Stated she was taking 1 tablet instead of 1/2 tablet.  Please advise.     LOV  3/20/2019  Last refill  3/20/2019

## 2019-05-03 DIAGNOSIS — E11.65 TYPE 2 DIABETES MELLITUS WITH HYPERGLYCEMIA, WITHOUT LONG-TERM CURRENT USE OF INSULIN: ICD-10-CM

## 2019-05-03 RX ORDER — LOSARTAN POTASSIUM 25 MG/1
12.5 TABLET ORAL DAILY
Qty: 90 TABLET | Refills: 0 | Status: SHIPPED | OUTPATIENT
Start: 2019-05-03 | End: 2019-05-27 | Stop reason: SDUPTHER

## 2019-05-03 NOTE — TELEPHONE ENCOUNTER
----- Message from Kassy Shook sent at 5/3/2019  2:34 PM CDT -----  Contact: Self/  [phone]  Type: RX Refill Request    Who Called: Patient    Refill or New Rx:  Refill    RX Name and Strength:  losartan (COZAAR) 25 MG tablet    How is the patient currently taking it? (ex. 1XDay): 0.5X Day    Is this a 30 day or 90 day RX:  90 day    Preferred Pharmacy with phone number:  [facility] [place] [address]    Local or Mail Order:  Local      Would the patient rather a call back or a response via [facility]?  Call Back    Best Call Back Number:  [phone]

## 2019-05-13 ENCOUNTER — PATIENT OUTREACH (OUTPATIENT)
Dept: ADMINISTRATIVE | Facility: HOSPITAL | Age: 49
End: 2019-05-13

## 2019-05-15 DIAGNOSIS — E78.5 HYPERLIPIDEMIA LDL GOAL <100: ICD-10-CM

## 2019-05-15 DIAGNOSIS — E11.9 TYPE 2 DIABETES MELLITUS WITHOUT COMPLICATION, WITHOUT LONG-TERM CURRENT USE OF INSULIN: ICD-10-CM

## 2019-05-15 RX ORDER — GLIMEPIRIDE 2 MG/1
TABLET ORAL
Qty: 90 TABLET | Refills: 1 | Status: SHIPPED | OUTPATIENT
Start: 2019-05-15 | End: 2019-05-27 | Stop reason: SDUPTHER

## 2019-05-15 RX ORDER — LISINOPRIL 2.5 MG/1
TABLET ORAL
Qty: 90 TABLET | Refills: 1 | OUTPATIENT
Start: 2019-05-15

## 2019-05-15 RX ORDER — METFORMIN HYDROCHLORIDE 1000 MG/1
TABLET ORAL
Qty: 180 TABLET | Refills: 1 | Status: SHIPPED | OUTPATIENT
Start: 2019-05-15 | End: 2019-05-27 | Stop reason: SDUPTHER

## 2019-05-15 RX ORDER — PRAVASTATIN SODIUM 40 MG/1
TABLET ORAL
Qty: 90 TABLET | Refills: 1 | Status: SHIPPED | OUTPATIENT
Start: 2019-05-15 | End: 2019-05-27 | Stop reason: SDUPTHER

## 2019-05-22 DIAGNOSIS — E11.9 TYPE 2 DIABETES MELLITUS WITHOUT COMPLICATION, WITHOUT LONG-TERM CURRENT USE OF INSULIN: ICD-10-CM

## 2019-05-22 RX ORDER — LISINOPRIL 2.5 MG/1
TABLET ORAL
Qty: 90 TABLET | Refills: 1 | OUTPATIENT
Start: 2019-05-22

## 2019-05-27 ENCOUNTER — LAB VISIT (OUTPATIENT)
Dept: LAB | Facility: HOSPITAL | Age: 49
End: 2019-05-27
Attending: INTERNAL MEDICINE
Payer: MEDICARE

## 2019-05-27 ENCOUNTER — OFFICE VISIT (OUTPATIENT)
Dept: FAMILY MEDICINE | Facility: CLINIC | Age: 49
End: 2019-05-27
Payer: MEDICARE

## 2019-05-27 VITALS
DIASTOLIC BLOOD PRESSURE: 84 MMHG | TEMPERATURE: 98 F | HEART RATE: 83 BPM | OXYGEN SATURATION: 96 % | WEIGHT: 293 LBS | BODY MASS INDEX: 50.02 KG/M2 | HEIGHT: 64 IN | SYSTOLIC BLOOD PRESSURE: 126 MMHG | RESPIRATION RATE: 18 BRPM

## 2019-05-27 DIAGNOSIS — E11.65 TYPE 2 DIABETES MELLITUS WITH HYPERGLYCEMIA, WITHOUT LONG-TERM CURRENT USE OF INSULIN: Primary | ICD-10-CM

## 2019-05-27 DIAGNOSIS — E11.9 TYPE 2 DIABETES MELLITUS WITHOUT COMPLICATION, WITHOUT LONG-TERM CURRENT USE OF INSULIN: ICD-10-CM

## 2019-05-27 DIAGNOSIS — N25.81 SECONDARY HYPERPARATHYROIDISM: ICD-10-CM

## 2019-05-27 DIAGNOSIS — E78.5 HYPERLIPIDEMIA ASSOCIATED WITH TYPE 2 DIABETES MELLITUS: ICD-10-CM

## 2019-05-27 DIAGNOSIS — E11.69 HYPERLIPIDEMIA ASSOCIATED WITH TYPE 2 DIABETES MELLITUS: ICD-10-CM

## 2019-05-27 LAB
ALBUMIN SERPL BCP-MCNC: 3.2 G/DL (ref 3.5–5.2)
ALBUMIN/CREAT UR: 6.5 UG/MG (ref 0–30)
ALP SERPL-CCNC: 75 U/L (ref 55–135)
ALT SERPL W/O P-5'-P-CCNC: 13 U/L (ref 10–44)
ANION GAP SERPL CALC-SCNC: 7 MMOL/L (ref 8–16)
AST SERPL-CCNC: 12 U/L (ref 10–40)
BILIRUB SERPL-MCNC: 0.4 MG/DL (ref 0.1–1)
BUN SERPL-MCNC: 14 MG/DL (ref 6–20)
CALCIUM SERPL-MCNC: 9.1 MG/DL (ref 8.7–10.5)
CHLORIDE SERPL-SCNC: 106 MMOL/L (ref 95–110)
CO2 SERPL-SCNC: 30 MMOL/L (ref 23–29)
CREAT SERPL-MCNC: 1 MG/DL (ref 0.5–1.4)
CREAT UR-MCNC: 123 MG/DL (ref 15–325)
EST. GFR  (AFRICAN AMERICAN): >60 ML/MIN/1.73 M^2
EST. GFR  (NON AFRICAN AMERICAN): >60 ML/MIN/1.73 M^2
ESTIMATED AVG GLUCOSE: 157 MG/DL (ref 68–131)
GLUCOSE SERPL-MCNC: 165 MG/DL (ref 70–110)
HBA1C MFR BLD HPLC: 7.1 % (ref 4–5.6)
MICROALBUMIN UR DL<=1MG/L-MCNC: 8 UG/ML
POTASSIUM SERPL-SCNC: 4.7 MMOL/L (ref 3.5–5.1)
PROT SERPL-MCNC: 7.3 G/DL (ref 6–8.4)
SODIUM SERPL-SCNC: 143 MMOL/L (ref 136–145)

## 2019-05-27 PROCEDURE — 83036 HEMOGLOBIN GLYCOSYLATED A1C: CPT

## 2019-05-27 PROCEDURE — 99999 PR PBB SHADOW E&M-EST. PATIENT-LVL III: ICD-10-PCS | Mod: PBBFAC,,, | Performed by: INTERNAL MEDICINE

## 2019-05-27 PROCEDURE — 99214 PR OFFICE/OUTPT VISIT, EST, LEVL IV, 30-39 MIN: ICD-10-PCS | Mod: S$PBB,,, | Performed by: INTERNAL MEDICINE

## 2019-05-27 PROCEDURE — 80053 COMPREHEN METABOLIC PANEL: CPT

## 2019-05-27 PROCEDURE — 99999 PR PBB SHADOW E&M-EST. PATIENT-LVL III: CPT | Mod: PBBFAC,,, | Performed by: INTERNAL MEDICINE

## 2019-05-27 PROCEDURE — 82043 UR ALBUMIN QUANTITATIVE: CPT

## 2019-05-27 PROCEDURE — 99214 OFFICE O/P EST MOD 30 MIN: CPT | Mod: S$PBB,,, | Performed by: INTERNAL MEDICINE

## 2019-05-27 PROCEDURE — 99213 OFFICE O/P EST LOW 20 MIN: CPT | Mod: PBBFAC,PO | Performed by: INTERNAL MEDICINE

## 2019-05-27 RX ORDER — LOSARTAN POTASSIUM 25 MG/1
25 TABLET ORAL DAILY
Qty: 90 TABLET | Refills: 1 | Status: SHIPPED | OUTPATIENT
Start: 2019-05-27 | End: 2019-06-04 | Stop reason: SDUPTHER

## 2019-05-27 RX ORDER — PRAVASTATIN SODIUM 40 MG/1
40 TABLET ORAL DAILY
Qty: 90 TABLET | Refills: 1 | Status: SHIPPED | OUTPATIENT
Start: 2019-05-27 | End: 2020-01-09 | Stop reason: SDUPTHER

## 2019-05-27 RX ORDER — GLIMEPIRIDE 2 MG/1
2 TABLET ORAL EVERY MORNING
Qty: 90 TABLET | Refills: 1 | Status: SHIPPED | OUTPATIENT
Start: 2019-05-27 | End: 2019-05-28

## 2019-05-27 RX ORDER — METFORMIN HYDROCHLORIDE 1000 MG/1
1000 TABLET ORAL 2 TIMES DAILY WITH MEALS
Qty: 180 TABLET | Refills: 1 | Status: SHIPPED | OUTPATIENT
Start: 2019-05-27 | End: 2020-01-09 | Stop reason: SDUPTHER

## 2019-05-27 NOTE — PROGRESS NOTES
SUBJECTIVE     Chief Complaint   Patient presents with    Medication Refill     follow up       HPI  Ava Driscoll is a 48 y.o. female with multiple medical diagnoses as listed in the medical history and problem list that presents for follow-up for DM2 with medication refill. Pt has been doing well since her last visit. She is fully compliant with meds and denies any adverse side effects. Pt reports her mother received a warning that Losartan is causing cancer, so she self-discontinued. She never received any warnings from her pharmacy. Pt reports her cough has stopped since discontinuing the Lisinopril. Otherwise, pt is without any other complaints today.     PAST MEDICAL HISTORY:  Past Medical History:   Diagnosis Date    Behavioral problem     Diabetes mellitus     History of psychiatric care     History of psychiatric hospitalization     Psychiatric problem     Schizophrenia        PAST SURGICAL HISTORY:  Past Surgical History:   Procedure Laterality Date     SECTION      HYSTERECTOMY         SOCIAL HISTORY:  Social History     Socioeconomic History    Marital status: Single     Spouse name: Not on file    Number of children: Not on file    Years of education: Not on file    Highest education level: Not on file   Occupational History    Not on file   Social Needs    Financial resource strain: Not on file    Food insecurity:     Worry: Not on file     Inability: Not on file    Transportation needs:     Medical: Not on file     Non-medical: Not on file   Tobacco Use    Smoking status: Never Smoker    Smokeless tobacco: Never Used   Substance and Sexual Activity    Alcohol use: No    Drug use: No    Sexual activity: Never   Lifestyle    Physical activity:     Days per week: Not on file     Minutes per session: Not on file    Stress: Not on file   Relationships    Social connections:     Talks on phone: Not on file     Gets together: Not on file     Attends Sikh service: Not on  file     Active member of club or organization: Not on file     Attends meetings of clubs or organizations: Not on file     Relationship status: Not on file   Other Topics Concern    Patient feels they ought to cut down on drinking/drug use Not Asked    Patient annoyed by others criticizing their drinking/drug use Not Asked    Patient has felt bad or guilty about drinking/drug use Not Asked    Patient has had a drink/used drugs as an eye opener in the AM Not Asked   Social History Narrative    Not on file       FAMILY HISTORY:  Family History   Problem Relation Age of Onset    Arthritis Mother     Diabetes type II Father     Breast cancer Maternal Aunt 80    Ovarian cancer Neg Hx        ALLERGIES AND MEDICATIONS: updated and reviewed.  Review of patient's allergies indicates:   Allergen Reactions    Ace inhibitors      Cough     Current Outpatient Medications   Medication Sig Dispense Refill    blood sugar diagnostic Strp Check BS once a day. 200 each 3    blood-glucose meter kit Check BS once a day. 1 each 0    INVEGA SUSTENNA 156 mg/mL Syrg injection       lancets (LANCETS,ULTRA THIN) Misc Check BS once a day. 200 each 3    metFORMIN (GLUCOPHAGE) 1000 MG tablet Take 1 tablet (1,000 mg total) by mouth 2 (two) times daily with meals. 180 tablet 1    pravastatin (PRAVACHOL) 40 MG tablet Take 1 tablet (40 mg total) by mouth once daily. 90 tablet 1    glimepiride (AMARYL) 2 MG tablet Take 1 tablet (2 mg total) by mouth every morning. 90 tablet 1    losartan (COZAAR) 25 MG tablet Take 1 tablet (25 mg total) by mouth once daily. 90 tablet 1    olopatadine (PATADAY) 0.2 % Drop instill 1 drop into both eyes once daily  0     No current facility-administered medications for this visit.        ROS  Review of Systems   Constitutional: Negative for chills and fever.   HENT: Negative for hearing loss and sore throat.    Eyes: Negative for visual disturbance.   Respiratory: Negative for cough and shortness of  "breath.    Cardiovascular: Negative for chest pain, palpitations and leg swelling.   Gastrointestinal: Negative for abdominal pain, constipation, diarrhea, nausea and vomiting.   Genitourinary: Negative for dysuria, frequency and urgency.   Musculoskeletal: Negative for arthralgias, joint swelling and myalgias.   Skin: Negative for rash and wound.   Neurological: Negative for headaches.   Psychiatric/Behavioral: Negative for agitation and confusion. The patient is not nervous/anxious.          OBJECTIVE     Physical Exam  Vitals:    05/27/19 0846   BP: 126/84   Pulse: 83   Resp: 18   Temp: 98.1 °F (36.7 °C)    Body mass index is 50.97 kg/m².  Weight: 134.7 kg (296 lb 15.4 oz)   Height: 5' 4" (162.6 cm)     Physical Exam   Constitutional: She is oriented to person, place, and time. She appears well-developed and well-nourished. No distress.   HENT:   Head: Normocephalic and atraumatic.   Right Ear: External ear normal.   Left Ear: External ear normal.   Nose: Nose normal.   Mouth/Throat: Oropharynx is clear and moist.   B/L cerumen in ears   Eyes: Conjunctivae and EOM are normal. Right eye exhibits no discharge. Left eye exhibits no discharge. No scleral icterus.   Neck: Normal range of motion. Neck supple. No JVD present. No tracheal deviation present.   Cardiovascular: Normal rate, regular rhythm and intact distal pulses. Exam reveals no gallop and no friction rub.   No murmur heard.  Pulmonary/Chest: Effort normal and breath sounds normal. No respiratory distress. She has no wheezes.   Abdominal: Soft. Bowel sounds are normal. She exhibits no distension and no mass. There is no tenderness. There is no rebound and no guarding.   Musculoskeletal: Normal range of motion. She exhibits no edema, tenderness or deformity.   Neurological: She is alert and oriented to person, place, and time. She exhibits normal muscle tone. Coordination normal.   Skin: Skin is warm and dry. No rash noted. No erythema.   Psychiatric: She " has a normal mood and affect. Her behavior is normal. Judgment and thought content normal.         Health Maintenance       Date Due Completion Date    TETANUS VACCINE 10/19/1988 ---    Pneumococcal Vaccine (Medium Risk) (1 of 1 - PPSV23) 10/19/1989 ---    Hemoglobin A1c 02/13/2019 8/13/2018    Influenza Vaccine 08/01/2019 ---    Lipid Panel 08/13/2019 8/13/2018    Eye Exam 09/27/2019 9/27/2018    Foot Exam 11/26/2019 11/26/2018    Override on 11/26/2018: Done    Low Dose Statin 05/15/2020 5/15/2019    Mammogram 09/12/2020 9/12/2018            ASSESSMENT     48 y.o. female with     1. Type 2 diabetes mellitus with hyperglycemia, without long-term current use of insulin    2. Secondary hyperparathyroidism    3. Hyperlipidemia associated with type 2 diabetes mellitus    4. BMI 50.0-59.9, adult        PLAN:     1. Type 2 diabetes mellitus with hyperglycemia, without long-term current use of insulin  - Stable; no acute issues  - Pt had labs today  - metFORMIN (GLUCOPHAGE) 1000 MG tablet; Take 1 tablet (1,000 mg total) by mouth 2 (two) times daily with meals.  Dispense: 180 tablet; Refill: 1  - losartan (COZAAR) 25 MG tablet; Take 1 tablet (25 mg total) by mouth once daily.  Dispense: 90 tablet; Refill: 1  - glimepiride (AMARYL) 2 MG tablet; Take 1 tablet (2 mg total) by mouth every morning.  Dispense: 90 tablet; Refill: 1    2. Secondary hyperparathyroidism  - Stable; no acute issues  - Monitor    3. Hyperlipidemia associated with type 2 diabetes mellitus  - Stable; no acute issues  - The current medical regimen is effective;  continue present plan and medications.  - pravastatin (PRAVACHOL) 40 MG tablet; Take 1 tablet (40 mg total) by mouth once daily.  Dispense: 90 tablet; Refill: 1    4. BMI 50.0-59.9, adult  - Pt aware of importance of eating a prudent diet and exercising        RTC in 6 months     Keerthi Murphy MD  05/27/2019 9:03 AM        No follow-ups on file.

## 2019-05-28 ENCOUNTER — TELEPHONE (OUTPATIENT)
Dept: FAMILY MEDICINE | Facility: CLINIC | Age: 49
End: 2019-05-28

## 2019-05-28 DIAGNOSIS — E11.65 TYPE 2 DIABETES MELLITUS WITH HYPERGLYCEMIA, WITHOUT LONG-TERM CURRENT USE OF INSULIN: Primary | ICD-10-CM

## 2019-05-28 DIAGNOSIS — E55.9 VITAMIN D DEFICIENCY: ICD-10-CM

## 2019-05-28 RX ORDER — GLIMEPIRIDE 4 MG/1
4 TABLET ORAL
Qty: 90 TABLET | Refills: 1 | Status: SHIPPED | OUTPATIENT
Start: 2019-05-28 | End: 2019-12-11 | Stop reason: SDUPTHER

## 2019-05-28 NOTE — TELEPHONE ENCOUNTER
Let pt know diabetes a bit worse  Increase amaryl from 2 mg to 4 mg. She can double up and  new dose when she is out. Thanks   Also 3 month recall into system for diabetes

## 2019-05-28 NOTE — TELEPHONE ENCOUNTER
----- Message from Keerthi Murphy MD sent at 5/28/2019  2:35 PM CDT -----  Yes, I want to increase her Glimepiride from 2 to 4 mg. I'll send it now. Thanks!    Keerthi  ----- Message -----  From: EMMETT Cantu  Sent: 5/28/2019   7:29 AM  To: Keerthi Murphy MD    Not sure if you wanted to increase anything or was it increased at yesterdays visit already

## 2019-06-04 DIAGNOSIS — E11.65 TYPE 2 DIABETES MELLITUS WITH HYPERGLYCEMIA, WITHOUT LONG-TERM CURRENT USE OF INSULIN: ICD-10-CM

## 2019-06-04 RX ORDER — LOSARTAN POTASSIUM 25 MG/1
25 TABLET ORAL DAILY
Qty: 90 TABLET | Refills: 1 | Status: SHIPPED | OUTPATIENT
Start: 2019-06-04 | End: 2020-01-17 | Stop reason: SDUPTHER

## 2019-06-04 NOTE — TELEPHONE ENCOUNTER
----- Message from Meli Jacobson sent at 6/4/2019  1:34 PM CDT -----  Contact: self  [phone]  Type: Patient Call Back    Who called: self    What is the request in detail: patient states she was given lisinopril (PRINIVIL,ZESTRIL) 2.5 MG tablet, which makes her cough, and is the wrong medication. States she needs losartan (COZAAR) 25 MG tablet instead. Please call pt in regards to this.  .  Zonia [address] [address]  Phone: [phone] Fax: [phone]    Would the patient rather a call back or a response via [facility]? Call back    Best call back number: [phone]    Additional Information: Pt would like the Lisinopril to be taken off of her medication list.

## 2019-06-04 NOTE — TELEPHONE ENCOUNTER
----- Message from Meli Jacobson sent at 6/4/2019  1:34 PM CDT -----  Contact: self  [phone]  Type: Patient Call Back    Who called: self    What is the request in detail: patient states she was given lisinopril (PRINIVIL,ZESTRIL) 2.5 MG tablet, which makes her cough, and is the wrong medication. States she needs losartan (COZAAR) 25 MG tablet instead. Please call pt in regards to this.  .  [facility] [address] [address]  Phone: [phone] Fax: [phone]    Would the patient rather a call back or a response via [facility]? Call back    Best call back number: [phone]    Additional Information: Pt would like the Lisinopril to be taken off of her medication list.

## 2019-06-04 NOTE — TELEPHONE ENCOUNTER
Patient stated she would like to have losartan refilled.  Stated lisinopril makes her cough.  Does not want to take medication and would like to have it removed from her med list.  Please advise.

## 2019-08-15 ENCOUNTER — TELEPHONE (OUTPATIENT)
Dept: SURGERY | Facility: CLINIC | Age: 49
End: 2019-08-15

## 2019-08-16 DIAGNOSIS — E11.9 TYPE 2 DIABETES MELLITUS WITHOUT COMPLICATION: ICD-10-CM

## 2019-08-19 DIAGNOSIS — E11.9 TYPE 2 DIABETES MELLITUS WITHOUT COMPLICATION, WITHOUT LONG-TERM CURRENT USE OF INSULIN: ICD-10-CM

## 2019-08-19 RX ORDER — LISINOPRIL 2.5 MG/1
TABLET ORAL
Qty: 90 TABLET | Refills: 1 | OUTPATIENT
Start: 2019-08-19

## 2019-09-09 ENCOUNTER — TELEPHONE (OUTPATIENT)
Dept: FAMILY MEDICINE | Facility: CLINIC | Age: 49
End: 2019-09-09

## 2019-09-09 DIAGNOSIS — E11.65 TYPE 2 DIABETES MELLITUS WITH HYPERGLYCEMIA, WITHOUT LONG-TERM CURRENT USE OF INSULIN: Primary | ICD-10-CM

## 2019-09-09 NOTE — TELEPHONE ENCOUNTER
----- Message from Alyce Adrien sent at 9/9/2019  2:09 PM CDT -----  Contact: Self [phone]  Type:  Patient Requesting Referral    Who Called:Self    Referral to What Specialty:Podiatry    Reason for Referral:get toe nails clipped    Does the patient want the referral with a specific physician?:Dr. Gordon    Is the specialist [facility]    Would the patient rather a call back or a response via [facility]? Call back    Best Call Back Number:[phone]

## 2019-09-24 ENCOUNTER — PATIENT OUTREACH (OUTPATIENT)
Dept: ADMINISTRATIVE | Facility: OTHER | Age: 49
End: 2019-09-24

## 2019-09-25 ENCOUNTER — OFFICE VISIT (OUTPATIENT)
Dept: SURGERY | Facility: CLINIC | Age: 49
End: 2019-09-25
Payer: MEDICARE

## 2019-09-25 ENCOUNTER — HOSPITAL ENCOUNTER (OUTPATIENT)
Dept: RADIOLOGY | Facility: HOSPITAL | Age: 49
Discharge: HOME | End: 2019-09-25
Attending: INTERNAL MEDICINE
Payer: MEDICARE

## 2019-09-25 VITALS
HEIGHT: 64 IN | DIASTOLIC BLOOD PRESSURE: 76 MMHG | BODY MASS INDEX: 50.02 KG/M2 | WEIGHT: 293 LBS | HEART RATE: 81 BPM | TEMPERATURE: 98 F | SYSTOLIC BLOOD PRESSURE: 125 MMHG

## 2019-09-25 DIAGNOSIS — Z80.3 FAMILY HISTORY OF BREAST CANCER: ICD-10-CM

## 2019-09-25 DIAGNOSIS — Z12.39 BREAST CANCER SCREENING: ICD-10-CM

## 2019-09-25 DIAGNOSIS — Z12.39 BREAST CANCER SCREENING: Primary | ICD-10-CM

## 2019-09-25 PROCEDURE — 99999 PR PBB SHADOW E&M-EST. PATIENT-LVL III: ICD-10-PCS | Mod: PBBFAC,,, | Performed by: NURSE PRACTITIONER

## 2019-09-25 PROCEDURE — 99213 OFFICE O/P EST LOW 20 MIN: CPT | Mod: PBBFAC | Performed by: NURSE PRACTITIONER

## 2019-09-25 PROCEDURE — 77063 MAMMO DIGITAL SCREENING BILAT WITH TOMOSYNTHESIS_CAD: ICD-10-PCS | Mod: 26,,, | Performed by: RADIOLOGY

## 2019-09-25 PROCEDURE — 77067 SCR MAMMO BI INCL CAD: CPT | Mod: TC,PO

## 2019-09-25 PROCEDURE — 99213 OFFICE O/P EST LOW 20 MIN: CPT | Mod: S$PBB,,, | Performed by: NURSE PRACTITIONER

## 2019-09-25 PROCEDURE — 99213 PR OFFICE/OUTPT VISIT, EST, LEVL III, 20-29 MIN: ICD-10-PCS | Mod: S$PBB,,, | Performed by: NURSE PRACTITIONER

## 2019-09-25 PROCEDURE — 77067 SCR MAMMO BI INCL CAD: CPT | Mod: 26,,, | Performed by: RADIOLOGY

## 2019-09-25 PROCEDURE — 77063 BREAST TOMOSYNTHESIS BI: CPT | Mod: 26,,, | Performed by: RADIOLOGY

## 2019-09-25 PROCEDURE — 99999 PR PBB SHADOW E&M-EST. PATIENT-LVL III: CPT | Mod: PBBFAC,,, | Performed by: NURSE PRACTITIONER

## 2019-09-25 PROCEDURE — 77067 MAMMO DIGITAL SCREENING BILAT WITH TOMOSYNTHESIS_CAD: ICD-10-PCS | Mod: 26,,, | Performed by: RADIOLOGY

## 2019-09-25 NOTE — PROGRESS NOTES
Keila:  Pt needs to RTC in 3 months to recheck right nipple to ensure no detrimental change; please place on reminder list.  Thanks.    Lukas:    Please set pt up for further imaging as I messaged you about.  Thanks.

## 2019-09-25 NOTE — PROGRESS NOTES
Patient ID: Ava Driscoll is a 48 y.o. female.    Chief Complaint: Follow-up (Annual MMG & CBE)        HPI:  Established patient presents today for breast cancer screening.    Breast History:    Personal history of breast cancer:  no  Personal history of breast biopsy:  no  Personal history of breast surgery:  no    Breast Imaging    18 bilat screening MMG report reviewed:  scattered areas of fibroglandular density  BI-RADS 1  Screening MMG in 1 year recommended  TC-7 lifetime risk 12.37%    Interval Breast History    Patient denies palpable breast mass, breast pain, breast-related skin changes, nipple itching, nipple discharge and nipple retraction.  No other breast-related concerns.     GYN History:  Age of menarche:  11 y/o   (miscarriage x 1), first live birth at 30 y/o  Uterus and ovaries:  S/p total hysterectomy at 41 y/o  Menopause:  41 y/o  HRT usage:  never    Social History:  Tobacco use:  former smoker    Other Oncologic History:  Personal history of other cancer not abovementioned:  no  Personal history of genetic testing:  no    Family Oncologic History:    Ashkenazi Rastafarian ancestry:  no  History of genetic testing in relatives:  no    Family History   Problem Relation Age of Onset    Breast cancer Maternal Aunt 80        unilat    Ovarian cancer Neg Hx          Patient's Breast Cancer Risk Assessment Scores:  Taking into account the above information, the patient's breast cancer risk assessment scores were calculated today as follows:  Tyrer-Cuzick lifetime risk:  15.6%  Shannan model 5-year risk:  1.0%      Review of Systems - See HPI.  Objective:   Physical Exam   Pulmonary/Chest: Effort normal. No respiratory distress. She exhibits no mass, no edema and no deformity. Right breast exhibits no inverted nipple, no mass, no nipple discharge, no skin change and no tenderness. Left breast exhibits no inverted nipple, no mass, no nipple discharge, no skin change and no tenderness. No breast swelling.    Right nipple with area of vague, superficial firmness, ~2-3mm in size with no associated abnormality appreciated; unable to elicit d/c from right nipple; pt states this is longstanding (for years) and is unchanged; not suggestive of pathology.  Right breast with diffuse lateral, mild TTP; no associated abnormality appreciated; not suggestive of pathology.   Genitourinary: No breast swelling.   Lymphadenopathy:     She has no cervical adenopathy.     She has no axillary adenopathy.        Right: No supraclavicular adenopathy present.        Left: No supraclavicular adenopathy present.      She has no right or left infraclavicular adenopathy.   Assessment:       1. Breast cancer screening    2. Family history of breast cancer          Counseling / Medical Decision-Making:       Encouraged breast awareness, including monthly breast self-exams.  Advised patient to RTC with any interval changes or concerns.  Questions were encouraged and answered to patient's satisfaction, and patient verbalized understanding of information and agreement with the plan.   Plan:     -Bilat screening MMG today  -RTC in 3 months to recheck right nipple to ensure no detrimental change

## 2019-09-25 NOTE — PROGRESS NOTES
Lukas,    Please call this patient, and let her know there was an asymmetry seen in her right breast on screening mammogram, for which diagnostic mammogram with possible ultrasound (if indicated) is recommended.  Direct to me any questions she may have.      Please schedule her for her imaging, and send me orders to cosign.    Thanks,  Gopal

## 2019-09-26 ENCOUNTER — TELEPHONE (OUTPATIENT)
Dept: RADIOLOGY | Facility: HOSPITAL | Age: 49
End: 2019-09-26

## 2019-09-26 NOTE — TELEPHONE ENCOUNTER
See previous note. Patient is currently in ER.    Spoke with patient and explained mammogram findings.Patient expressed understanding of results. Patient scheduled abnormal mammogram follow up appointment at [facility] on 10/2/2019.

## 2019-10-02 ENCOUNTER — HOSPITAL ENCOUNTER (OUTPATIENT)
Dept: RADIOLOGY | Facility: HOSPITAL | Age: 49
Discharge: HOME | End: 2019-10-02
Attending: NURSE PRACTITIONER
Payer: MEDICARE

## 2019-10-02 DIAGNOSIS — R92.8 ABNORMAL MAMMOGRAM: ICD-10-CM

## 2019-10-02 PROCEDURE — 77065 MAMMO DIGITAL DIAGNOSTIC RIGHT WITH TOMOSYNTHESIS_CAD: ICD-10-PCS | Mod: 26,,, | Performed by: RADIOLOGY

## 2019-10-02 PROCEDURE — 77065 DX MAMMO INCL CAD UNI: CPT | Mod: 26,,, | Performed by: RADIOLOGY

## 2019-10-02 PROCEDURE — 77065 DX MAMMO INCL CAD UNI: CPT | Mod: TC,PO

## 2019-10-02 PROCEDURE — 77061 BREAST TOMOSYNTHESIS UNI: CPT | Mod: 26,,, | Performed by: RADIOLOGY

## 2019-10-02 PROCEDURE — 77061 BREAST TOMOSYNTHESIS UNI: CPT | Mod: TC,PO

## 2019-10-02 PROCEDURE — 77061 MAMMO DIGITAL DIAGNOSTIC RIGHT WITH TOMOSYNTHESIS_CAD: ICD-10-PCS | Mod: 26,,, | Performed by: RADIOLOGY

## 2019-10-03 ENCOUNTER — TELEPHONE (OUTPATIENT)
Dept: SURGERY | Facility: CLINIC | Age: 49
End: 2019-10-03

## 2019-10-03 ENCOUNTER — PATIENT OUTREACH (OUTPATIENT)
Dept: ADMINISTRATIVE | Facility: OTHER | Age: 49
End: 2019-10-03

## 2019-10-03 NOTE — TELEPHONE ENCOUNTER
Returned call to  regarding her Breast Imaging was reported as Negative per Gopal Maxwell.NP. Patient is due for her Bilat  Mammogram in 9/26/20 . Patient to follow up in 12/25/19 for recheck Right Nipple to ensure no detrimental changes . Patient will be placed on the recall list to follow up.

## 2019-10-03 NOTE — PROGRESS NOTES
Keila:  -Pt not on portal.  Please call her, and let her know her right mammogram was reported as negative.  Direct to me any questions she may have.  -RTC around 12/25/19 to recheck right nipple to ensure no detrimental change; please call pt to schedule  -Bilat MMG due around 9/26/20; please place on reminder list

## 2019-10-14 ENCOUNTER — PATIENT OUTREACH (OUTPATIENT)
Dept: ADMINISTRATIVE | Facility: OTHER | Age: 49
End: 2019-10-14

## 2019-10-17 ENCOUNTER — OFFICE VISIT (OUTPATIENT)
Dept: PODIATRY | Facility: CLINIC | Age: 49
End: 2019-10-17
Payer: MEDICARE

## 2019-10-17 VITALS — BODY MASS INDEX: 50.02 KG/M2 | HEIGHT: 64 IN | WEIGHT: 293 LBS

## 2019-10-17 DIAGNOSIS — E11.65 TYPE 2 DIABETES MELLITUS WITH HYPERGLYCEMIA, WITHOUT LONG-TERM CURRENT USE OF INSULIN: Primary | ICD-10-CM

## 2019-10-17 DIAGNOSIS — B35.1 ONYCHOMYCOSIS DUE TO DERMATOPHYTE: ICD-10-CM

## 2019-10-17 PROCEDURE — 99203 PR OFFICE/OUTPT VISIT, NEW, LEVL III, 30-44 MIN: ICD-10-PCS | Mod: S$PBB,,, | Performed by: PODIATRIST

## 2019-10-17 PROCEDURE — 99212 OFFICE O/P EST SF 10 MIN: CPT | Mod: PBBFAC,PO | Performed by: PODIATRIST

## 2019-10-17 PROCEDURE — 99999 PR PBB SHADOW E&M-EST. PATIENT-LVL II: CPT | Mod: PBBFAC,,, | Performed by: PODIATRIST

## 2019-10-17 PROCEDURE — 99203 OFFICE O/P NEW LOW 30 MIN: CPT | Mod: S$PBB,,, | Performed by: PODIATRIST

## 2019-10-17 PROCEDURE — 99999 PR PBB SHADOW E&M-EST. PATIENT-LVL II: ICD-10-PCS | Mod: PBBFAC,,, | Performed by: PODIATRIST

## 2019-10-17 NOTE — LETTER
October 21, 2019      Keerthi Murphy MD  [address] [facility] [address]  [address]  [place] [address]           [facility]  [address]  [place] [address]  Phone: [phone]          Patient: Ava Driscoll   MR Number: [id number]   YOB: 1970   Date of Visit: 10/17/2019       Dear Dr. Keerthi Murphy:    Thank you for referring Ava Driscoll to me for evaluation. Attached you will find relevant portions of my assessment and plan of care.    If you have questions, please do not hesitate to call me. I look forward to following Ava Driscoll along with you.    Sincerely,    Lamar Laurent, DPTHOMAS    Enclosure  CC:  No Recipients    If you would like to receive this communication electronically, please contact [email or web address] or [phone] to request more information on [facility] Link access.    For providers and/or their staff who would like to refer a patient to [facility], please contact us through our one-stop-shop provider referral line, [facility] , at [phone].    If you feel you have received this communication in error or would no longer like to receive these types of communications, please e-mail [email or web address]

## 2019-10-22 NOTE — PROGRESS NOTES
Subjective:      Patient ID: Ava Driscoll is a 49 y.o. female.    Chief Complaint: Diabetic Foot Exam (last ov 5/27/2019 PCP Dr. Murphy) and Nail Care    Ava is a 49 y.o. female who presents to the clinic upon referral from Dr. Murphy  for evaluation and treatment of diabetic feet. Ava has a past medical history of Behavioral problem, Diabetes mellitus, History of psychiatric care, History of psychiatric hospitalization, Psychiatric problem, Retinopathy due to secondary diabetes, and Schizophrenia. Presents for diabetic foot risk assessment.       PCP: Keerthi Murphy MD    Date Last Seen by PCP: per above    Current shoe gear: Tennis shoes    Hemoglobin A1C   Date Value Ref Range Status   05/27/2019 7.1 (H) 4.0 - 5.6 % Final     Comment:     ADA Screening Guidelines:  5.7-6.4%  Consistent with prediabetes  >or=6.5%  Consistent with diabetes  High levels of fetal hemoglobin interfere with the HbA1C  assay. Heterozygous hemoglobin variants (HbS, HgC, etc)do  not significantly interfere with this assay.   However, presence of multiple variants may affect accuracy.     08/13/2018 6.8 (H) 4.0 - 5.6 % Final     Comment:     ADA Screening Guidelines:  5.7-6.4%  Consistent with prediabetes  >or=6.5%  Consistent with diabetes  High levels of fetal hemoglobin interfere with the HbA1C  assay. Heterozygous hemoglobin variants (HbS, HgC, etc)do  not significantly interfere with this assay.   However, presence of multiple variants may affect accuracy.     01/07/2013 7.1 (H) 4.0 - 6.2 % Final           Review of Systems   Constitution: Negative for chills, diaphoresis and fever.   Cardiovascular: Negative for claudication, cyanosis, leg swelling and syncope.   Respiratory: Negative for cough and shortness of breath.    Skin: Positive for color change, nail changes and suspicious lesions.   Musculoskeletal: Negative for falls, joint pain, muscle cramps and muscle weakness.   Gastrointestinal: Negative for diarrhea, nausea and  vomiting.   Neurological: Negative for disturbances in coordination, numbness, paresthesias, sensory change, tremors and weakness.   Psychiatric/Behavioral: Negative for altered mental status.           Objective:      Physical Exam   Constitutional: She appears well-developed. She is cooperative.   Oriented to time, place, and person.   Cardiovascular:   DP and PT pulses are palpable bilaterally. 3 sec capillary refill time and toes and feet are warm to touch proximally .  There is  hair growth on the feet and toes b/l. There is no edema b/l. No spider veins or varicosities present b/l.      Musculoskeletal:   Equinus noted b/l ankles with < 10 deg DF noted. MMT 5/5 in DF/PF/Inv/Ev resistance with no reproduction of pain in any direction. Passive range of motion of ankle and pedal joints is painless b/l.     Feet:   Right Foot:   Skin Integrity: Negative for callus or dry skin.   Left Foot:   Skin Integrity: Negative for callus or dry skin.   Lymphadenopathy:   Negative lymphadenopathy bilateral popliteal fossa and tarsal tunnel.   Neurological: She is alert.   Light touch, proprioception, and sharp/dull sensation are all intact bilaterally. Protective threshold with the Kilbourne-Wienstein monofilament is intact bilaterally.    Skin:   No open lesions, lacerations or wounds noted.Interdigital spaces clean, dry and intact b/l. No erythema noted to b/l foot.    Toenails 1-5 bilaterally are elongated by 2-3 mm, thickened by 2-3 mm, discolored/yellowed, dystrophic, brittle with subungual debris.     Psychiatric: She has a normal mood and affect.             Assessment:       Encounter Diagnoses   Name Primary?    Type 2 diabetes mellitus with hyperglycemia, without long-term current use of insulin Yes    Onychomycosis due to dermatophyte          Plan:       Ava was seen today for diabetic foot exam and nail care.    Diagnoses and all orders for this visit:    Type 2 diabetes mellitus with hyperglycemia, without  long-term current use of insulin    Onychomycosis due to dermatophyte      I counseled the patient on her conditions, their implications and medical management.    - Shoe inspection. Diabetic Foot Education. Patient reminded of the importance of good nutrition and blood sugar control to help prevent podiatric complications of diabetes. Patient instructed on proper foot hygeine. We discussed wearing proper shoe gear, daily foot inspections, never walking without protective shoe gear, never putting sharp instruments to feet, routine podiatric nail visits every 12 months.   - With patient's permission, nails were aggressively reduced and debrided x 10 to their soft tissue attachment mechanically and with electric , removing all offending nail and debris. Patient relates relief following the procedure. He will continue to monitor the areas daily, inspect his feet, wear protective shoe gear when ambulatory, moisturizer to maintain skin integrity and follow in this office in approximately 12 months, sooner p.r.n.     F/u one year DM foot exam sooner PRN

## 2019-10-23 ENCOUNTER — LAB VISIT (OUTPATIENT)
Dept: LAB | Facility: HOSPITAL | Age: 49
End: 2019-10-23
Payer: MEDICARE

## 2019-10-23 ENCOUNTER — OFFICE VISIT (OUTPATIENT)
Dept: FAMILY MEDICINE | Facility: CLINIC | Age: 49
End: 2019-10-23
Payer: MEDICARE

## 2019-10-23 VITALS
HEIGHT: 64 IN | RESPIRATION RATE: 17 BRPM | WEIGHT: 293 LBS | SYSTOLIC BLOOD PRESSURE: 118 MMHG | BODY MASS INDEX: 50.02 KG/M2 | DIASTOLIC BLOOD PRESSURE: 84 MMHG

## 2019-10-23 DIAGNOSIS — E11.65 TYPE 2 DIABETES MELLITUS WITH HYPERGLYCEMIA, WITHOUT LONG-TERM CURRENT USE OF INSULIN: ICD-10-CM

## 2019-10-23 DIAGNOSIS — B96.89 ACUTE BACTERIAL SINUSITIS: Primary | ICD-10-CM

## 2019-10-23 DIAGNOSIS — E11.9 TYPE 2 DIABETES MELLITUS WITHOUT COMPLICATION: ICD-10-CM

## 2019-10-23 DIAGNOSIS — J01.90 ACUTE BACTERIAL SINUSITIS: Primary | ICD-10-CM

## 2019-10-23 LAB
ALBUMIN SERPL BCP-MCNC: 3.6 G/DL (ref 3.5–5.2)
ALP SERPL-CCNC: 87 U/L (ref 55–135)
ALT SERPL W/O P-5'-P-CCNC: 11 U/L (ref 10–44)
ANION GAP SERPL CALC-SCNC: 5 MMOL/L (ref 8–16)
AST SERPL-CCNC: 12 U/L (ref 10–40)
BASOPHILS # BLD AUTO: 0.03 K/UL (ref 0–0.2)
BASOPHILS NFR BLD: 0.3 % (ref 0–1.9)
BILIRUB SERPL-MCNC: 0.6 MG/DL (ref 0.1–1)
BUN SERPL-MCNC: 15 MG/DL (ref 6–20)
CALCIUM SERPL-MCNC: 9.3 MG/DL (ref 8.7–10.5)
CHLORIDE SERPL-SCNC: 102 MMOL/L (ref 95–110)
CHOLEST SERPL-MCNC: 138 MG/DL (ref 120–199)
CHOLEST/HDLC SERPL: 4.1 {RATIO} (ref 2–5)
CO2 SERPL-SCNC: 32 MMOL/L (ref 23–29)
CREAT SERPL-MCNC: 1 MG/DL (ref 0.5–1.4)
DIFFERENTIAL METHOD: ABNORMAL
EOSINOPHIL # BLD AUTO: 0.3 K/UL (ref 0–0.5)
EOSINOPHIL NFR BLD: 3.3 % (ref 0–8)
ERYTHROCYTE [DISTWIDTH] IN BLOOD BY AUTOMATED COUNT: 14.6 % (ref 11.5–14.5)
EST. GFR  (AFRICAN AMERICAN): >60 ML/MIN/1.73 M^2
EST. GFR  (NON AFRICAN AMERICAN): >60 ML/MIN/1.73 M^2
GLUCOSE SERPL-MCNC: 207 MG/DL (ref 70–110)
HCT VFR BLD AUTO: 39.3 % (ref 37–48.5)
HDLC SERPL-MCNC: 34 MG/DL (ref 40–75)
HDLC SERPL: 24.6 % (ref 20–50)
HGB BLD-MCNC: 12.5 G/DL (ref 12–16)
IMM GRANULOCYTES # BLD AUTO: 0.04 K/UL (ref 0–0.04)
IMM GRANULOCYTES NFR BLD AUTO: 0.5 % (ref 0–0.5)
LDLC SERPL CALC-MCNC: 84.2 MG/DL (ref 63–159)
LYMPHOCYTES # BLD AUTO: 3.2 K/UL (ref 1–4.8)
LYMPHOCYTES NFR BLD: 36.5 % (ref 18–48)
MCH RBC QN AUTO: 31.3 PG (ref 27–31)
MCHC RBC AUTO-ENTMCNC: 31.8 G/DL (ref 32–36)
MCV RBC AUTO: 99 FL (ref 82–98)
MONOCYTES # BLD AUTO: 0.5 K/UL (ref 0.3–1)
MONOCYTES NFR BLD: 6 % (ref 4–15)
NEUTROPHILS # BLD AUTO: 4.7 K/UL (ref 1.8–7.7)
NEUTROPHILS NFR BLD: 53.4 % (ref 38–73)
NONHDLC SERPL-MCNC: 104 MG/DL
NRBC BLD-RTO: 0 /100 WBC
PLATELET # BLD AUTO: 335 K/UL (ref 150–350)
PMV BLD AUTO: 9.2 FL (ref 9.2–12.9)
POTASSIUM SERPL-SCNC: 4.7 MMOL/L (ref 3.5–5.1)
PROT SERPL-MCNC: 8 G/DL (ref 6–8.4)
RBC # BLD AUTO: 3.99 M/UL (ref 4–5.4)
SODIUM SERPL-SCNC: 139 MMOL/L (ref 136–145)
TRIGL SERPL-MCNC: 99 MG/DL (ref 30–150)
WBC # BLD AUTO: 8.86 K/UL (ref 3.9–12.7)

## 2019-10-23 PROCEDURE — 99213 OFFICE O/P EST LOW 20 MIN: CPT | Mod: PBBFAC,PO,25 | Performed by: INTERNAL MEDICINE

## 2019-10-23 PROCEDURE — 99999 PR PBB SHADOW E&M-EST. PATIENT-LVL III: ICD-10-PCS | Mod: PBBFAC,,, | Performed by: INTERNAL MEDICINE

## 2019-10-23 PROCEDURE — 99999 PR PBB SHADOW E&M-EST. PATIENT-LVL III: CPT | Mod: PBBFAC,,, | Performed by: INTERNAL MEDICINE

## 2019-10-23 PROCEDURE — 80053 COMPREHEN METABOLIC PANEL: CPT

## 2019-10-23 PROCEDURE — 99214 OFFICE O/P EST MOD 30 MIN: CPT | Mod: S$PBB,,, | Performed by: INTERNAL MEDICINE

## 2019-10-23 PROCEDURE — 96372 THER/PROPH/DIAG INJ SC/IM: CPT | Mod: PBBFAC,PO

## 2019-10-23 PROCEDURE — 85025 COMPLETE CBC W/AUTO DIFF WBC: CPT

## 2019-10-23 PROCEDURE — 99214 PR OFFICE/OUTPT VISIT, EST, LEVL IV, 30-39 MIN: ICD-10-PCS | Mod: S$PBB,,, | Performed by: INTERNAL MEDICINE

## 2019-10-23 PROCEDURE — 80061 LIPID PANEL: CPT

## 2019-10-23 RX ORDER — DOXYCYCLINE HYCLATE 100 MG
100 TABLET ORAL 2 TIMES DAILY
Qty: 14 TABLET | Refills: 0 | Status: SHIPPED | OUTPATIENT
Start: 2019-10-23 | End: 2019-10-30

## 2019-10-23 RX ORDER — PROMETHAZINE HYDROCHLORIDE AND DEXTROMETHORPHAN HYDROBROMIDE 6.25; 15 MG/5ML; MG/5ML
5 SYRUP ORAL EVERY 12 HOURS PRN
Qty: 180 ML | Refills: 0 | Status: SHIPPED | OUTPATIENT
Start: 2019-10-23 | End: 2019-11-02

## 2019-10-23 RX ORDER — TRIAMCINOLONE ACETONIDE 40 MG/ML
40 INJECTION, SUSPENSION INTRA-ARTICULAR; INTRAMUSCULAR
Status: COMPLETED | OUTPATIENT
Start: 2019-10-23 | End: 2019-10-23

## 2019-10-23 RX ADMIN — TRIAMCINOLONE ACETONIDE 40 MG: 40 INJECTION, SUSPENSION INTRA-ARTICULAR; INTRAMUSCULAR at 11:10

## 2019-10-23 NOTE — PROGRESS NOTES
Pt declines any vaccines today.  Has eye exam scheduled 11/7.  Will do foot exam today.   After obtaining consent, and per orders of Dr. Murphy, injection of Triamcinolone 40mg/mL given into the right upper outer quad gluteus by Bethany Lock. Patient instructed to remain in clinic for 20 minutes afterwards, and to report any adverse reaction to me immediately.

## 2019-10-23 NOTE — PROGRESS NOTES
SUBJECTIVE     Chief Complaint   Patient presents with    Sinusitis    Diabetic Foot Exam    Hoarse       HPI  Ava Driscoll is a 49 y.o. female with multiple medical diagnoses as listed in the medical history and problem list that presents for evaluation of URI x 1 week. Pt reports a productive cough, nasal congestion, sneezing, and running eyes. Denies any fever, chills, or night sweats. Pt has been taking OTC Claritin and Heavenly [place] without improvement of symptoms. Denies any sick contacts/recent travel.    PAST MEDICAL HISTORY:  Past Medical History:   Diagnosis Date    Behavioral problem     Diabetes mellitus     History of psychiatric care     History of psychiatric hospitalization     Psychiatric problem     Retinopathy due to secondary diabetes     Schizophrenia        PAST SURGICAL HISTORY:  Past Surgical History:   Procedure Laterality Date     SECTION      HYSTERECTOMY         SOCIAL HISTORY:  Social History     Socioeconomic History    Marital status: Single     Spouse name: Not on file    Number of children: Not on file    Years of education: Not on file    Highest education level: Not on file   Occupational History    Not on file   Social Needs    Financial resource strain: Not on file    Food insecurity:     Worry: Not on file     Inability: Not on file    Transportation needs:     Medical: Not on file     Non-medical: Not on file   Tobacco Use    Smoking status: Never Smoker    Smokeless tobacco: Never Used   Substance and Sexual Activity    Alcohol use: No    Drug use: No    Sexual activity: Never   Lifestyle    Physical activity:     Days per week: Not on file     Minutes per session: Not on file    Stress: Not on file   Relationships    Social connections:     Talks on phone: Not on file     Gets together: Not on file     Attends Quaker service: Not on file     Active member of club or organization: Not on file     Attends meetings of clubs or organizations:  Not on file     Relationship status: Not on file   Other Topics Concern    Patient feels they ought to cut down on drinking/drug use Not Asked    Patient annoyed by others criticizing their drinking/drug use Not Asked    Patient has felt bad or guilty about drinking/drug use Not Asked    Patient has had a drink/used drugs as an eye opener in the AM Not Asked   Social History Narrative    Not on file       FAMILY HISTORY:  Family History   Problem Relation Age of Onset    Arthritis Mother     Diabetes type II Father     Breast cancer Maternal Aunt 80        unilat    Ovarian cancer Neg Hx        ALLERGIES AND MEDICATIONS: updated and reviewed.  Review of patient's allergies indicates:   Allergen Reactions    Ace inhibitors      Cough     Current Outpatient Medications   Medication Sig Dispense Refill    blood sugar diagnostic Strp Check BS once a day. 200 each 3    blood-glucose meter kit Check BS once a day. 1 each 0    glimepiride (AMARYL) 4 MG tablet Take 1 tablet (4 mg total) by mouth before breakfast. 90 tablet 1    INVEGA SUSTENNA 156 mg/mL Syrg injection       lancets (LANCETS,ULTRA THIN) Misc Check BS once a day. 200 each 3    losartan (COZAAR) 25 MG tablet Take 1 tablet (25 mg total) by mouth once daily. 90 tablet 1    metFORMIN (GLUCOPHAGE) 1000 MG tablet Take 1 tablet (1,000 mg total) by mouth 2 (two) times daily with meals. 180 tablet 1    pravastatin (PRAVACHOL) 40 MG tablet Take 1 tablet (40 mg total) by mouth once daily. 90 tablet 1    doxycycline (VIBRA-TABS) 100 MG tablet Take 1 tablet (100 mg total) by mouth 2 (two) times daily. for 7 days 14 tablet 0    promethazine-dextromethorphan (PROMETHAZINE-DM) 6.25-15 mg/5 mL Syrp Take 5 mLs by mouth every 12 (twelve) hours as needed. 180 mL 0     No current facility-administered medications for this visit.        ROS  Review of Systems   Constitutional: Negative for chills and fever.   HENT: Positive for congestion and sneezing. Negative  "for hearing loss and sore throat.    Eyes: Negative for visual disturbance.   Respiratory: Positive for cough. Negative for shortness of breath.    Cardiovascular: Negative for chest pain, palpitations and leg swelling.   Gastrointestinal: Negative for abdominal pain, constipation, diarrhea, nausea and vomiting.   Genitourinary: Negative for dysuria, frequency and urgency.   Musculoskeletal: Negative for arthralgias, joint swelling and myalgias.   Skin: Negative for rash and wound.   Neurological: Negative for headaches.   Psychiatric/Behavioral: Negative for agitation and confusion. The patient is not nervous/anxious.        OBJECTIVE     Physical Exam  Vitals:    10/23/19 1047   BP: 118/84   Resp: 17    Body mass index is 50.81 kg/m².  Weight: 134.3 kg (296 lb)   Height: 5' 4" (162.6 cm)     Physical Exam   Constitutional: She is oriented to person, place, and time. She appears well-developed and well-nourished. No distress.   HENT:   Head: Normocephalic and atraumatic.   Right Ear: Hearing, tympanic membrane and external ear normal.   Left Ear: Hearing, tympanic membrane and external ear normal.   Nose: Nose normal. No rhinorrhea.   Mouth/Throat: No uvula swelling. Posterior oropharyngeal erythema present. No posterior oropharyngeal edema.   Eyes: Conjunctivae and EOM are normal. Right eye exhibits no discharge. Left eye exhibits no discharge. No scleral icterus.   Neck: Normal range of motion. Neck supple. No JVD present. No tracheal deviation present.   Cardiovascular: Normal rate, regular rhythm and intact distal pulses. Exam reveals no gallop and no friction rub.   No murmur heard.  Pulmonary/Chest: Effort normal and breath sounds normal. No respiratory distress. She has no wheezes.   Abdominal: Soft. Bowel sounds are normal. She exhibits no distension and no mass. There is no tenderness. There is no rebound and no guarding.   Musculoskeletal: Normal range of motion. She exhibits no edema, tenderness or " deformity.   Neurological: She is alert and oriented to person, place, and time. She exhibits normal muscle tone. Coordination normal.   Skin: Skin is warm and dry. No rash noted. No erythema.   Psychiatric: She has a normal mood and affect. Her behavior is normal. Judgment and thought content normal.         Health Maintenance       Date Due Completion Date    TETANUS VACCINE 10/19/1988 ---    Pneumococcal Vaccine (Medium Risk) (1 of 1 - PPSV23) 10/19/1989 ---    Lipid Panel 08/13/2019 8/13/2018    Influenza Vaccine (1) 09/01/2019 ---    Eye Exam 09/27/2019 9/27/2018    Foot Exam 11/26/2019 11/26/2018    Override on 11/26/2018: Done    Hemoglobin A1c 11/27/2019 5/27/2019    Low Dose Statin 10/17/2020 10/17/2019    Mammogram 10/02/2021 10/2/2019            ASSESSMENT     49 y.o. female with     1. Acute bacterial sinusitis    2. Type 2 diabetes mellitus with hyperglycemia, without long-term current use of insulin        PLAN:     1. Acute bacterial sinusitis  - Pt advised to take Abx to completion  - Continue symptomatic treatment with rest, increase fluid intake, tylenol or ibuprofen PRN fever(temp >/= 100.4) or body aches. Okay to take OTC antihistamines, i.e. Bendaryl, Claritin, Allegra, etc. as needed.  - Okay to gargle with warm, salt water or use throat lozenges as needed  - doxycycline (VIBRA-TABS) 100 MG tablet; Take 1 tablet (100 mg total) by mouth 2 (two) times daily. for 7 days  Dispense: 14 tablet; Refill: 0  - promethazine-dextromethorphan (PROMETHAZINE-DM) 6.25-15 mg/5 mL Syrp; Take 5 mLs by mouth every 12 (twelve) hours as needed.  Dispense: 180 mL; Refill: 0    2. Type 2 diabetes mellitus with hyperglycemia, without long-term current use of insulin  - Check labs today  - CBC auto differential; Future  - Comprehensive metabolic panel; Future  - triamcinolone acetonide injection 40 mg        RTC in 1-2 weeks as needed for any acute worsening of current condition or failure to improve       Keerthi CAMPBELL  MD Jeffrey  10/23/2019 11:11 AM        No follow-ups on file.

## 2019-11-07 LAB
LEFT EYE DM RETINOPATHY: POSITIVE
RIGHT EYE DM RETINOPATHY: POSITIVE

## 2019-11-14 ENCOUNTER — PATIENT OUTREACH (OUTPATIENT)
Dept: ADMINISTRATIVE | Facility: HOSPITAL | Age: 49
End: 2019-11-14

## 2019-12-11 DIAGNOSIS — E11.65 TYPE 2 DIABETES MELLITUS WITH HYPERGLYCEMIA, WITHOUT LONG-TERM CURRENT USE OF INSULIN: ICD-10-CM

## 2019-12-11 RX ORDER — GLIMEPIRIDE 4 MG/1
4 TABLET ORAL
Qty: 90 TABLET | Refills: 1 | Status: SHIPPED | OUTPATIENT
Start: 2019-12-11 | End: 2020-01-09 | Stop reason: SDUPTHER

## 2019-12-26 ENCOUNTER — OFFICE VISIT (OUTPATIENT)
Dept: FAMILY MEDICINE | Facility: CLINIC | Age: 49
End: 2019-12-26
Payer: MEDICARE

## 2019-12-26 VITALS
BODY MASS INDEX: 50.02 KG/M2 | TEMPERATURE: 98 F | WEIGHT: 293 LBS | SYSTOLIC BLOOD PRESSURE: 126 MMHG | DIASTOLIC BLOOD PRESSURE: 84 MMHG | HEIGHT: 64 IN | HEART RATE: 100 BPM | OXYGEN SATURATION: 94 %

## 2019-12-26 DIAGNOSIS — K92.1 BLOOD IN STOOL: Primary | ICD-10-CM

## 2019-12-26 DIAGNOSIS — J34.89 SINUS PAIN: ICD-10-CM

## 2019-12-26 PROCEDURE — 99999 PR PBB SHADOW E&M-EST. PATIENT-LVL III: ICD-10-PCS | Mod: PBBFAC,,, | Performed by: INTERNAL MEDICINE

## 2019-12-26 PROCEDURE — 99214 OFFICE O/P EST MOD 30 MIN: CPT | Mod: S$PBB,,, | Performed by: INTERNAL MEDICINE

## 2019-12-26 PROCEDURE — 99213 OFFICE O/P EST LOW 20 MIN: CPT | Mod: PBBFAC,PO | Performed by: INTERNAL MEDICINE

## 2019-12-26 PROCEDURE — 99999 PR PBB SHADOW E&M-EST. PATIENT-LVL III: CPT | Mod: PBBFAC,,, | Performed by: INTERNAL MEDICINE

## 2019-12-26 PROCEDURE — 99214 PR OFFICE/OUTPT VISIT, EST, LEVL IV, 30-39 MIN: ICD-10-PCS | Mod: S$PBB,,, | Performed by: INTERNAL MEDICINE

## 2019-12-26 RX ORDER — LEVOCETIRIZINE DIHYDROCHLORIDE 5 MG/1
5 TABLET, FILM COATED ORAL NIGHTLY
Qty: 30 TABLET | Refills: 0 | Status: SHIPPED | OUTPATIENT
Start: 2019-12-26 | End: 2020-12-07

## 2019-12-26 RX ORDER — METHYLPREDNISOLONE 4 MG/1
TABLET ORAL
Qty: 1 PACKAGE | Refills: 0 | Status: ON HOLD | OUTPATIENT
Start: 2019-12-27 | End: 2020-04-21 | Stop reason: HOSPADM

## 2019-12-26 RX ORDER — PROMETHAZINE HYDROCHLORIDE AND DEXTROMETHORPHAN HYDROBROMIDE 6.25; 15 MG/5ML; MG/5ML
5 SYRUP ORAL EVERY 12 HOURS PRN
Qty: 180 ML | Refills: 0 | Status: SHIPPED | OUTPATIENT
Start: 2019-12-26 | End: 2020-01-05

## 2019-12-26 RX ORDER — ERGOCALCIFEROL 1.25 MG/1
CAPSULE ORAL
COMMUNITY
End: 2022-01-27 | Stop reason: SDUPTHER

## 2019-12-26 RX ORDER — IBUPROFEN 800 MG/1
TABLET ORAL
Status: ON HOLD | COMMUNITY
Start: 2019-12-19 | End: 2020-04-21 | Stop reason: HOSPADM

## 2019-12-26 NOTE — PROGRESS NOTES
SUBJECTIVE     Chief Complaint   Patient presents with    Rectal Bleeding       HPI  Ava Driscoll is a 49 y.o. female with multiple medical diagnoses as listed in the medical history and problem list that presents for evaluation of rectal bleeding x 1 on Saturday. Pt does not recall if she was straining or passed a hard stool, but reports seeing dark blood in the water and on the toilet paper with wiping. She does report having hemorrhoids, but they are not currently inflammed/hurting. Pt reports having a BM yesterday and did not have any blood in the stool. Pt denies alternating between diarrhea/constipation, fever, chills, night sweats, or unexpected weight loss.     PAST MEDICAL HISTORY:  Past Medical History:   Diagnosis Date    Behavioral problem     Diabetes mellitus     History of psychiatric care     History of psychiatric hospitalization     Psychiatric problem     Retinopathy due to secondary diabetes     Schizophrenia        PAST SURGICAL HISTORY:  Past Surgical History:   Procedure Laterality Date     SECTION      HYSTERECTOMY         SOCIAL HISTORY:  Social History     Socioeconomic History    Marital status: Single     Spouse name: Not on file    Number of children: Not on file    Years of education: Not on file    Highest education level: Not on file   Occupational History    Not on file   Social Needs    Financial resource strain: Not on file    Food insecurity:     Worry: Not on file     Inability: Not on file    Transportation needs:     Medical: Not on file     Non-medical: Not on file   Tobacco Use    Smoking status: Never Smoker    Smokeless tobacco: Never Used   Substance and Sexual Activity    Alcohol use: No    Drug use: No    Sexual activity: Never   Lifestyle    Physical activity:     Days per week: Not on file     Minutes per session: Not on file    Stress: Not at all   Relationships    Social connections:     Talks on phone: Not on file     Gets together:  Not on file     Attends Nondenominational service: Not on file     Active member of club or organization: Not on file     Attends meetings of clubs or organizations: Not on file     Relationship status: Not on file   Other Topics Concern    Patient feels they ought to cut down on drinking/drug use Not Asked    Patient annoyed by others criticizing their drinking/drug use Not Asked    Patient has felt bad or guilty about drinking/drug use Not Asked    Patient has had a drink/used drugs as an eye opener in the AM Not Asked   Social History Narrative    Not on file       FAMILY HISTORY:  Family History   Problem Relation Age of Onset    Arthritis Mother     Diabetes type II Father     Breast cancer Maternal Aunt 80        unilat    Ovarian cancer Neg Hx        ALLERGIES AND MEDICATIONS: updated and reviewed.  Review of patient's allergies indicates:   Allergen Reactions    Ace inhibitors      Cough     Current Outpatient Medications   Medication Sig Dispense Refill    blood sugar diagnostic Strp Check BS once a day. 200 each 3    blood-glucose meter kit Check BS once a day. 1 each 0    glimepiride (AMARYL) 4 MG tablet Take 1 tablet (4 mg total) by mouth before breakfast. 90 tablet 1    INVEGA SUSTENNA 156 mg/mL Syrg injection       lancets (LANCETS,ULTRA THIN) Misc Check BS once a day. 200 each 3    losartan (COZAAR) 25 MG tablet Take 1 tablet (25 mg total) by mouth once daily. 90 tablet 1    metFORMIN (GLUCOPHAGE) 1000 MG tablet Take 1 tablet (1,000 mg total) by mouth 2 (two) times daily with meals. 180 tablet 1    pravastatin (PRAVACHOL) 40 MG tablet Take 1 tablet (40 mg total) by mouth once daily. 90 tablet 1    ergocalciferol (ERGOCALCIFEROL) 50,000 unit Cap Vitamin D2 1,250 mcg (50,000 unit) capsule   Take 1 capsule every week by oral route.      ibuprofen (ADVIL,MOTRIN) 800 MG tablet       levocetirizine (XYZAL) 5 MG tablet Take 1 tablet (5 mg total) by mouth every evening. 30 tablet 0    [START ON  "12/27/2019] methylPREDNISolone (MEDROL DOSEPACK) 4 mg tablet use as directed 1 Package 0     No current facility-administered medications for this visit.        ROS  Review of Systems   Constitutional: Negative for chills and fever.   HENT: Positive for congestion (nasal), sinus pressure and sinus pain. Negative for hearing loss and sore throat.    Eyes: Negative for visual disturbance.   Respiratory: Negative for cough and shortness of breath.    Cardiovascular: Negative for chest pain, palpitations and leg swelling.   Gastrointestinal: Positive for blood in stool. Negative for abdominal pain, constipation, diarrhea, nausea and vomiting.   Genitourinary: Negative for dysuria, frequency and urgency.   Musculoskeletal: Negative for arthralgias, joint swelling and myalgias.   Skin: Negative for rash and wound.   Neurological: Negative for headaches.   Psychiatric/Behavioral: Negative for agitation and confusion. The patient is not nervous/anxious.          OBJECTIVE     Physical Exam  Vitals:    12/26/19 1053   BP: 126/84   Pulse: 100   Temp: 98.3 °F (36.8 °C)    Body mass index is 51.73 kg/m².  Weight: (!) 136.7 kg (301 lb 5.9 oz)   Height: 5' 4" (162.6 cm)     Physical Exam   Constitutional: She is oriented to person, place, and time. She appears well-developed and well-nourished. No distress.   HENT:   Head: Normocephalic and atraumatic.   Right Ear: External ear normal.   Left Ear: External ear normal.   Nose: Nose normal. No rhinorrhea. Right sinus exhibits no maxillary sinus tenderness and no frontal sinus tenderness. Left sinus exhibits no maxillary sinus tenderness and no frontal sinus tenderness.   Mouth/Throat: No uvula swelling. Posterior oropharyngeal erythema present. No posterior oropharyngeal edema.   Eyes: Conjunctivae and EOM are normal. Right eye exhibits no discharge. Left eye exhibits no discharge. No scleral icterus.   Neck: Normal range of motion. Neck supple. No JVD present. No tracheal " deviation present.   Cardiovascular: Normal rate, regular rhythm and intact distal pulses. Exam reveals no gallop and no friction rub.   No murmur heard.  Pulmonary/Chest: Effort normal and breath sounds normal. No respiratory distress. She has no wheezes.   Abdominal: Soft. Bowel sounds are normal. She exhibits no distension and no mass. There is no tenderness. There is no rebound and no guarding.   Musculoskeletal: Normal range of motion. She exhibits no edema, tenderness or deformity.   Neurological: She is alert and oriented to person, place, and time. She exhibits normal muscle tone. Coordination normal.   Skin: Skin is warm and dry. No rash noted. No erythema.   Psychiatric: She has a normal mood and affect. Her behavior is normal. Judgment and thought content normal.         Health Maintenance       Date Due Completion Date    TETANUS VACCINE 10/19/1988 ---    Pneumococcal Vaccine (Medium Risk) (1 of 1 - PPSV23) 10/19/1989 ---    Influenza Vaccine (1) 09/01/2019 ---    Hemoglobin A1c 11/27/2019 5/27/2019    Foot Exam 10/17/2020 10/17/2019 (Done)    Override on 10/17/2019: Done (Seen by Dr. Laurent)    Override on 11/26/2018: Done    Lipid Panel 10/23/2020 10/23/2019    Eye Exam 11/07/2020 11/7/2019    Low Dose Statin 12/26/2020 12/26/2019    Mammogram 10/02/2021 10/2/2019            ASSESSMENT     49 y.o. female with     1. Blood in stool    2. Sinus pain        PLAN:     1. Blood in stool  - Reportedly resolved  - Pt needs further eval with a C-scope as below  - Case request GI: COLONOSCOPY    2. Sinus pain  - Continue symptomatic treatment with rest, increase fluid intake, tylenol or ibuprofen PRN fever(temp >/= 100.4) or body aches. Okay to take OTC antihistamines, i.e. Bendaryl, Claritin, Allegra, etc. as needed.  - Okay to gargle with warm, salt water or use throat lozenges as needed  - methylPREDNISolone (MEDROL DOSEPACK) 4 mg tablet; use as directed  Dispense: 1 Package; Refill: 0  - levocetirizine  (XYZAL) 5 MG tablet; Take 1 tablet (5 mg total) by mouth every evening.  Dispense: 30 tablet; Refill: 0  - promethazine-dextromethorphan (PROMETHAZINE-DM) 6.25-15 mg/5 mL Syrp; Take 5 mLs by mouth every 12 (twelve) hours as needed.  Dispense: 180 mL; Refill: 0          RTC in 1-2 weeks as needed for any acute worsening of current condition or failure to improve       Keerthi Murphy MD  12/26/2019 11:17 AM        No follow-ups on file.

## 2020-01-09 DIAGNOSIS — E11.69 HYPERLIPIDEMIA ASSOCIATED WITH TYPE 2 DIABETES MELLITUS: ICD-10-CM

## 2020-01-09 DIAGNOSIS — E11.65 TYPE 2 DIABETES MELLITUS WITH HYPERGLYCEMIA, WITHOUT LONG-TERM CURRENT USE OF INSULIN: ICD-10-CM

## 2020-01-09 DIAGNOSIS — E78.5 HYPERLIPIDEMIA ASSOCIATED WITH TYPE 2 DIABETES MELLITUS: ICD-10-CM

## 2020-01-09 RX ORDER — GLIMEPIRIDE 4 MG/1
4 TABLET ORAL
Qty: 90 TABLET | Refills: 0 | Status: SHIPPED | OUTPATIENT
Start: 2020-01-09 | End: 2020-04-28 | Stop reason: SDUPTHER

## 2020-01-09 RX ORDER — METFORMIN HYDROCHLORIDE 1000 MG/1
1000 TABLET ORAL 2 TIMES DAILY WITH MEALS
Qty: 180 TABLET | Refills: 0 | Status: SHIPPED | OUTPATIENT
Start: 2020-01-09 | End: 2020-04-14

## 2020-01-09 RX ORDER — PRAVASTATIN SODIUM 40 MG/1
40 TABLET ORAL DAILY
Qty: 90 TABLET | Refills: 3 | Status: SHIPPED | OUTPATIENT
Start: 2020-01-09 | End: 2020-09-14 | Stop reason: SDUPTHER

## 2020-01-09 NOTE — TELEPHONE ENCOUNTER
Last Office Visit Info:   The patient's last visit with Keerthi Murphy MD was on 12/26/2019.    The patient's last visit in current department was on 12/26/2019.        Last CBC Results:   Lab Results   Component Value Date    WBC 8.86 10/23/2019    HGB 12.5 10/23/2019    HCT 39.3 10/23/2019     10/23/2019       Last CMP Results  Lab Results   Component Value Date     10/23/2019    K 4.7 10/23/2019     10/23/2019    CO2 32 (H) 10/23/2019    BUN 15 10/23/2019    CREATININE 1.0 10/23/2019    CALCIUM 9.3 10/23/2019    ALBUMIN 3.6 10/23/2019    AST 12 10/23/2019    ALT 11 10/23/2019       Last Lipids  Lab Results   Component Value Date    CHOL 138 10/23/2019    TRIG 99 10/23/2019    HDL 34 (L) 10/23/2019    LDLCALC 84.2 10/23/2019       Last A1C  Lab Results   Component Value Date    HGBA1C 7.1 (H) 05/27/2019       Last TSH  Lab Results   Component Value Date    TSH 2.030 07/08/2016         Current Med Refills  Medication List with Changes/Refills   Current Medications    BLOOD SUGAR DIAGNOSTIC STRP    Check BS once a day.       Start Date: 1/23/2019 End Date: --    BLOOD-GLUCOSE METER KIT    Check BS once a day.       Start Date: 1/23/2019 End Date: --    ERGOCALCIFEROL (ERGOCALCIFEROL) 50,000 UNIT CAP    Vitamin D2 1,250 mcg (50,000 unit) capsule   Take 1 capsule every week by oral route.       Start Date: --        End Date: --    GLIMEPIRIDE (AMARYL) 4 MG TABLET    Take 1 tablet (4 mg total) by mouth before breakfast.       Start Date: 12/11/2019End Date: 12/10/2020    IBUPROFEN (ADVIL,MOTRIN) 800 MG TABLET           Start Date: 12/19/2019End Date: --    INVEGA SUSTENNA 156 MG/ML SYRG INJECTION           Start Date: 4/3/2018  End Date: --    LANCETS (LANCETS,ULTRA THIN) MISC    Check BS once a day.       Start Date: 1/23/2019 End Date: --    LEVOCETIRIZINE (XYZAL) 5 MG TABLET    Take 1 tablet (5 mg total) by mouth every evening.       Start Date: 12/26/2019End Date: 12/25/2020    LOSARTAN (COZAAR)  25 MG TABLET    Take 1 tablet (25 mg total) by mouth once daily.       Start Date: 6/4/2019  End Date: 6/3/2020    METFORMIN (GLUCOPHAGE) 1000 MG TABLET    Take 1 tablet (1,000 mg total) by mouth 2 (two) times daily with meals.       Start Date: 5/27/2019 End Date: --    METHYLPREDNISOLONE (MEDROL DOSEPACK) 4 MG TABLET    use as directed       Start Date: 12/27/2019End Date: --    PRAVASTATIN (PRAVACHOL) 40 MG TABLET    Take 1 tablet (40 mg total) by mouth once daily.       Start Date: 5/27/2019 End Date: --       Order(s) placed per written order guidelines: none    Please advise.

## 2020-01-14 DIAGNOSIS — Z12.11 COLON CANCER SCREENING: Primary | ICD-10-CM

## 2020-01-14 RX ORDER — POLYETHYLENE GLYCOL 3350, SODIUM SULFATE, SODIUM CHLORIDE, POTASSIUM CHLORIDE, SODIUM ASCORBATE, AND ASCORBIC ACID 7.5-2.691G
2000 KIT ORAL ONCE
Qty: 1 BOTTLE | Refills: 0 | Status: SHIPPED | OUTPATIENT
Start: 2020-02-17 | End: 2020-02-17

## 2020-01-14 RX ORDER — SODIUM, POTASSIUM,MAG SULFATES 17.5-3.13G
1 SOLUTION, RECONSTITUTED, ORAL ORAL DAILY
Qty: 1 KIT | Refills: 0 | Status: SHIPPED | OUTPATIENT
Start: 2020-01-14 | End: 2020-01-16

## 2020-01-17 DIAGNOSIS — E11.65 TYPE 2 DIABETES MELLITUS WITH HYPERGLYCEMIA, WITHOUT LONG-TERM CURRENT USE OF INSULIN: ICD-10-CM

## 2020-01-17 NOTE — TELEPHONE ENCOUNTER
Last Office Visit Info:   The patient's last visit with Keerthi Murphy MD was on 12/26/2019.    The patient's last visit in current department was on 12/26/2019.        Last CBC Results:   Lab Results   Component Value Date    WBC 8.86 10/23/2019    HGB 12.5 10/23/2019    HCT 39.3 10/23/2019     10/23/2019       Last CMP Results  Lab Results   Component Value Date     10/23/2019    K 4.7 10/23/2019     10/23/2019    CO2 32 (H) 10/23/2019    BUN 15 10/23/2019    CREATININE 1.0 10/23/2019    CALCIUM 9.3 10/23/2019    ALBUMIN 3.6 10/23/2019    AST 12 10/23/2019    ALT 11 10/23/2019       Last Lipids  Lab Results   Component Value Date    CHOL 138 10/23/2019    TRIG 99 10/23/2019    HDL 34 (L) 10/23/2019    LDLCALC 84.2 10/23/2019       Last A1C  Lab Results   Component Value Date    HGBA1C 7.1 (H) 05/27/2019       Last TSH  Lab Results   Component Value Date    TSH 2.030 07/08/2016         Current Med Refills  Medication List with Changes/Refills   Current Medications    BLOOD SUGAR DIAGNOSTIC STRP    Check BS once a day.       Start Date: 1/23/2019 End Date: --    BLOOD-GLUCOSE METER KIT    Check BS once a day.       Start Date: 1/23/2019 End Date: --    ERGOCALCIFEROL (ERGOCALCIFEROL) 50,000 UNIT CAP    Vitamin D2 1,250 mcg (50,000 unit) capsule   Take 1 capsule every week by oral route.       Start Date: --        End Date: --    GLIMEPIRIDE (AMARYL) 4 MG TABLET    Take 1 tablet (4 mg total) by mouth before breakfast.       Start Date: 1/9/2020  End Date: 1/8/2021    IBUPROFEN (ADVIL,MOTRIN) 800 MG TABLET           Start Date: 12/19/2019End Date: --    INVEGA SUSTENNA 156 MG/ML SYRG INJECTION           Start Date: 4/3/2018  End Date: --    LANCETS (LANCETS,ULTRA THIN) MISC    Check BS once a day.       Start Date: 1/23/2019 End Date: --    LEVOCETIRIZINE (XYZAL) 5 MG TABLET    Take 1 tablet (5 mg total) by mouth every evening.       Start Date: 12/26/2019End Date: 12/25/2020    LOSARTAN (COZAAR)  25 MG TABLET    Take 1 tablet (25 mg total) by mouth once daily.       Start Date: 6/4/2019  End Date: 6/3/2020    METFORMIN (GLUCOPHAGE) 1000 MG TABLET    Take 1 tablet (1,000 mg total) by mouth 2 (two) times daily with meals.       Start Date: 1/9/2020  End Date: --    METHYLPREDNISOLONE (MEDROL DOSEPACK) 4 MG TABLET    use as directed       Start Date: 12/27/2019End Date: --    POLYETHYLENE GLYCOL (MOVIPREP) 100-7.5-2.691 GRAM SOLUTION    Take 2,000 mLs by mouth once. for 1 dose       Start Date: 2/17/2020 End Date: 2/17/2020    PRAVASTATIN (PRAVACHOL) 40 MG TABLET    Take 1 tablet (40 mg total) by mouth once daily.       Start Date: 1/9/2020  End Date: --       Order(s) placed per written order guidelines: none    Please advise.

## 2020-01-17 NOTE — TELEPHONE ENCOUNTER
----- Message from Lamar Borden sent at 1/17/2020  1:21 PM CST -----  Contact: pt  Type: Patient Call Back    Who called:pt    What is the request in detail:pt wants to discuss medications. Please call pt    Can the clinic reply by [facility]?    Would the patient rather a call back or a response via [facility]? call    Best call back number:[phone]    Additional Information:

## 2020-01-19 RX ORDER — LOSARTAN POTASSIUM 25 MG/1
25 TABLET ORAL DAILY
Qty: 90 TABLET | Refills: 1 | Status: ON HOLD | OUTPATIENT
Start: 2020-01-19 | End: 2020-04-21 | Stop reason: SDUPTHER

## 2020-02-17 ENCOUNTER — ANESTHESIA EVENT (OUTPATIENT)
Dept: ENDOSCOPY | Facility: HOSPITAL | Age: 50
End: 2020-02-17
Payer: MEDICARE

## 2020-02-18 ENCOUNTER — HOSPITAL ENCOUNTER (OUTPATIENT)
Facility: HOSPITAL | Age: 50
Discharge: HOME | End: 2020-02-18
Attending: INTERNAL MEDICINE | Admitting: INTERNAL MEDICINE
Payer: MEDICARE

## 2020-02-18 ENCOUNTER — ANESTHESIA (OUTPATIENT)
Dept: ENDOSCOPY | Facility: HOSPITAL | Age: 50
End: 2020-02-18
Payer: MEDICARE

## 2020-02-18 VITALS
SYSTOLIC BLOOD PRESSURE: 128 MMHG | HEART RATE: 66 BPM | DIASTOLIC BLOOD PRESSURE: 70 MMHG | TEMPERATURE: 98 F | RESPIRATION RATE: 18 BRPM | OXYGEN SATURATION: 98 %

## 2020-02-18 DIAGNOSIS — Z12.11 COLON CANCER SCREENING: ICD-10-CM

## 2020-02-18 PROCEDURE — D9220A PRA ANESTHESIA: Mod: PT,ANES,, | Performed by: ANESTHESIOLOGY

## 2020-02-18 PROCEDURE — D9220A PRA ANESTHESIA: ICD-10-PCS | Mod: PT,ANES,, | Performed by: ANESTHESIOLOGY

## 2020-02-18 PROCEDURE — 37000009 HC ANESTHESIA EA ADD 15 MINS: Performed by: INTERNAL MEDICINE

## 2020-02-18 PROCEDURE — 45380 COLONOSCOPY AND BIOPSY: CPT | Performed by: INTERNAL MEDICINE

## 2020-02-18 PROCEDURE — 27201012 HC FORCEPS, HOT/COLD, DISP: Performed by: INTERNAL MEDICINE

## 2020-02-18 PROCEDURE — 63600175 PHARM REV CODE 636 W HCPCS: Performed by: NURSE ANESTHETIST, CERTIFIED REGISTERED

## 2020-02-18 PROCEDURE — 45380 COLONOSCOPY AND BIOPSY: CPT | Mod: 59,,, | Performed by: INTERNAL MEDICINE

## 2020-02-18 PROCEDURE — D9220A PRA ANESTHESIA: Mod: PT,CRNA,, | Performed by: NURSE ANESTHETIST, CERTIFIED REGISTERED

## 2020-02-18 PROCEDURE — 45385 COLONOSCOPY W/LESION REMOVAL: CPT | Performed by: INTERNAL MEDICINE

## 2020-02-18 PROCEDURE — 37000008 HC ANESTHESIA 1ST 15 MINUTES: Performed by: INTERNAL MEDICINE

## 2020-02-18 PROCEDURE — 88305 TISSUE EXAM BY PATHOLOGIST: CPT | Mod: 26,,, | Performed by: PATHOLOGY

## 2020-02-18 PROCEDURE — 45385 PR COLONOSCOPY,REMV LESN,SNARE: ICD-10-PCS | Mod: PT,,, | Performed by: INTERNAL MEDICINE

## 2020-02-18 PROCEDURE — 45380 PR COLONOSCOPY,BIOPSY: ICD-10-PCS | Mod: 59,,, | Performed by: INTERNAL MEDICINE

## 2020-02-18 PROCEDURE — D9220A PRA ANESTHESIA: ICD-10-PCS | Mod: PT,CRNA,, | Performed by: NURSE ANESTHETIST, CERTIFIED REGISTERED

## 2020-02-18 PROCEDURE — 27201089 HC SNARE, DISP (ANY): Performed by: INTERNAL MEDICINE

## 2020-02-18 PROCEDURE — 88305 TISSUE EXAM BY PATHOLOGIST: ICD-10-PCS | Mod: 26,,, | Performed by: PATHOLOGY

## 2020-02-18 PROCEDURE — 88305 TISSUE EXAM BY PATHOLOGIST: CPT | Mod: 59 | Performed by: PATHOLOGY

## 2020-02-18 PROCEDURE — 45385 COLONOSCOPY W/LESION REMOVAL: CPT | Mod: PT,,, | Performed by: INTERNAL MEDICINE

## 2020-02-18 PROCEDURE — 63600175 PHARM REV CODE 636 W HCPCS: Performed by: ANESTHESIOLOGY

## 2020-02-18 RX ORDER — LIDOCAINE HYDROCHLORIDE 10 MG/ML
1 INJECTION, SOLUTION EPIDURAL; INFILTRATION; INTRACAUDAL; PERINEURAL ONCE
Status: DISCONTINUED | OUTPATIENT
Start: 2020-02-18 | End: 2020-02-18 | Stop reason: HOSPADM

## 2020-02-18 RX ORDER — PROPOFOL 10 MG/ML
VIAL (ML) INTRAVENOUS
Status: DISCONTINUED
Start: 2020-02-18 | End: 2020-02-18 | Stop reason: HOSPADM

## 2020-02-18 RX ORDER — LIDOCAINE HYDROCHLORIDE 20 MG/ML
INJECTION, SOLUTION EPIDURAL; INFILTRATION; INTRACAUDAL; PERINEURAL
Status: DISCONTINUED
Start: 2020-02-18 | End: 2020-02-18 | Stop reason: HOSPADM

## 2020-02-18 RX ORDER — LIDOCAINE HYDROCHLORIDE 20 MG/ML
INJECTION INTRAVENOUS
Status: DISCONTINUED | OUTPATIENT
Start: 2020-02-18 | End: 2020-02-18

## 2020-02-18 RX ORDER — SODIUM CHLORIDE 9 MG/ML
INJECTION, SOLUTION INTRAVENOUS CONTINUOUS
Status: DISCONTINUED | OUTPATIENT
Start: 2020-02-18 | End: 2020-02-18 | Stop reason: HOSPADM

## 2020-02-18 RX ORDER — PROPOFOL 10 MG/ML
VIAL (ML) INTRAVENOUS
Status: DISCONTINUED | OUTPATIENT
Start: 2020-02-18 | End: 2020-02-18

## 2020-02-18 RX ADMIN — PROPOFOL 30 MG: 10 INJECTION, EMULSION INTRAVENOUS at 10:02

## 2020-02-18 RX ADMIN — Medication 80 MG: at 10:02

## 2020-02-18 RX ADMIN — PROPOFOL 50 MG: 10 INJECTION, EMULSION INTRAVENOUS at 10:02

## 2020-02-18 RX ADMIN — SODIUM CHLORIDE: 0.9 INJECTION, SOLUTION INTRAVENOUS at 10:02

## 2020-02-18 RX ADMIN — PROPOFOL 100 MG: 10 INJECTION, EMULSION INTRAVENOUS at 10:02

## 2020-02-18 NOTE — ANESTHESIA PREPROCEDURE EVALUATION
2020    Pre-operative evaluation for Procedure(s) (LRB):  COLONOSCOPY (N/A)    Ava Driscoll is a 49 y.o. female     Patient Active Problem List   Diagnosis    Paranoid schizophrenia    Auditory hallucinations    History of total hysterectomy with bilateral salpingo-oophorectomy (BSO)    BMI 50.0-59.9, adult    Type 2 diabetes mellitus with hyperglycemia, without long-term current use of insulin    Vitamin D deficiency    Secondary hyperparathyroidism    Hyperlipidemia associated with type 2 diabetes mellitus       Review of patient's allergies indicates:   Allergen Reactions    Ace inhibitors      Cough       No current facility-administered medications on file prior to encounter.      Current Outpatient Medications on File Prior to Encounter   Medication Sig Dispense Refill    ergocalciferol (ERGOCALCIFEROL) 50,000 unit Cap Vitamin D2 1,250 mcg (50,000 unit) capsule   Take 1 capsule every week by oral route.      ibuprofen (ADVIL,MOTRIN) 800 MG tablet       INVEGA SUSTENNA 156 mg/mL Syrg injection       levocetirizine (XYZAL) 5 MG tablet Take 1 tablet (5 mg total) by mouth every evening. 30 tablet 0    methylPREDNISolone (MEDROL DOSEPACK) 4 mg tablet use as directed 1 Package 0    blood sugar diagnostic Strp Check BS once a day. 200 each 3    blood-glucose meter kit Check BS once a day. 1 each 0    lancets (LANCETS,ULTRA THIN) Misc Check BS once a day. 200 each 3       Past Surgical History:   Procedure Laterality Date     SECTION      HYSTERECTOMY         Anesthesia Evaluation    I have reviewed the Patient Summary Reports.     I have reviewed the Medications.     Review of Systems  Anesthesia Hx:  No problems with previous Anesthesia  History of prior surgery of interest to airway management or planning: Denies Family Hx of Anesthesia complications.   Denies Personal Hx of  Anesthesia complications.   Social:  Non-Smoker    Hematology/Oncology:  Hematology Normal   Oncology Normal     EENT/Dental:EENT/Dental Normal   Cardiovascular:   hyperlipidemia    Pulmonary:  Pulmonary Normal    Renal/:  Renal/ Normal     Hepatic/GI:   Bowel Prep.    Musculoskeletal:  Musculoskeletal Normal    Neurological:  Neurology Normal    Endocrine:   Diabetes  Metabolic Disorders, Morbid Obesity / BMI > 40  Psych:   Psychiatric History          Physical Exam  General:  Obesity, Well nourished    Airway/Jaw/Neck:  Airway Findings: Mouth Opening: Normal Jaw/Neck Findings:  Neck ROM: Normal ROM      Dental:  Dental Findings: Periodontal disease, Mild   Chest/Lungs:  Chest/Lungs Findings: Normal Respiratory Rate     Heart/Vascular:  Heart Findings: Rate: Normal        Mental Status:  Mental Status Findings:  Cooperative, Alert and Oriented         Anesthesia Plan  Type of Anesthesia, risks & benefits discussed:  Anesthesia Type:  general  Patient's Preference:   Intra-op Monitoring Plan: standard ASA monitors  Intra-op Monitoring Plan Comments:   Post Op Pain Control Plan: per primary service following discharge from PACU  Post Op Pain Control Plan Comments:   Induction:   IV  Beta Blocker:  Patient is not currently on a Beta-Blocker (No further documentation required).       Informed Consent: Patient understands risks and agrees with Anesthesia plan.  Questions answered. Anesthesia consent signed with patient.  ASA Score: 3     Day of Surgery Review of History & Physical: I have interviewed and examined the patient. I have reviewed the patient's H&P dated:  There are no significant changes.          Ready For Surgery From Anesthesia Perspective.

## 2020-02-18 NOTE — ANESTHESIA POSTPROCEDURE EVALUATION
Anesthesia Post Evaluation    Patient: Ava Driscoll    Procedure(s) Performed: Procedure(s) (LRB):  COLONOSCOPY (N/A)    Final Anesthesia Type: general    Patient location during evaluation: GI PACU  Patient participation: Yes- Able to Participate  Level of consciousness: awake and alert and oriented  Post-procedure vital signs: reviewed and stable  Pain management: adequate  Airway patency: patent    PONV status at discharge: No PONV  Anesthetic complications: no      Cardiovascular status: blood pressure returned to baseline and hemodynamically stable  Respiratory status: unassisted, spontaneous ventilation and room air  Hydration status: euvolemic  Follow-up not needed.          Vitals Value Taken Time   /70 2/18/2020 11:32 AM   Temp 36.8 °C (98.2 °F) 2/18/2020 11:02 AM   Pulse 66 2/18/2020 11:32 AM   Resp 18 2/18/2020 11:32 AM   SpO2 98 % 2/18/2020 11:32 AM         Event Time     Out of Recovery 11:59:23          Pain/Coco Score: Coco Score: 10 (2/18/2020 11:32 AM)  Modified Coco Score: 20 (2/18/2020 11:32 AM)

## 2020-02-18 NOTE — [employer] PATIENT INSTRUCTIONS
Discharge Summary/Instructions after an Endoscopic Procedure  Patient Name: Ava Driscoll  Patient MRN: [id number]  Patient YOB: 1970  Tuesday, February 18, 2020  Edvin Zuniga MD  RESTRICTIONS:  During your procedure today, you received medications for sedation.  These   medications may affect your judgment, balance and coordination.  Therefore,   for 24 hours, you have the following restrictions:   - DO NOT drive a car, operate machinery, make legal/financial decisions,   sign important papers or drink alcohol.    ACTIVITY:  Today: no heavy lifting, straining or running due to procedural   sedation/anesthesia.  The following day: return to full activity including work.  DIET:  Eat and drink normally unless instructed otherwise.     TREATMENT FOR COMMON SIDE EFFECTS:  - Mild abdominal pain, nausea, belching, bloating or excessive gas:  rest,   eat lightly and use a heating pad.  - Sore Throat: treat with throat lozenges and/or gargle with warm salt   water.  - Because air was used during the procedure, expelling large amounts of air   from your rectum or belching is normal.  - If a bowel prep was taken, you may not have a bowel movement for 1-3 days.    This is normal.  SYMPTOMS TO WATCH FOR AND REPORT TO YOUR PHYSICIAN:  1. Abdominal pain or bloating, other than gas cramps.  2. Chest pain.  3. Back pain.  4. Signs of infection such as: chills or fever occurring within 24 hours   after the procedure.  5. Rectal bleeding, which would show as bright red, maroon, or black stools.   (A tablespoon of blood from the rectum is not serious, especially if   hemorrhoids are present.)  6. Vomiting.  7. Weakness or dizziness.  GO DIRECTLY TO THE NEAREST EMERGENCY ROOM IF YOU HAVE ANY OF THE FOLLOWING:      Difficulty breathing              Chills and/or fever over 101 F   Persistent vomiting and/or vomiting blood   Severe abdominal pain   Severe chest pain   Black, tarry stools   Bleeding- more than one  tablespoon   Any other symptom or condition that you feel may need urgent attention  Your doctor recommends these additional instructions:  If any biopsies were taken, your doctors clinic will contact you in 1 to 2   weeks with any results.  - Discharge patient to home (with escort).   - Patient has a contact number available for emergencies.  The signs and   symptoms of potential delayed complications were discussed with the   patient.  Return to normal activities tomorrow.  Written discharge   instructions were provided to the patient.   - Resume previous diet.   - Continue present medications.   - Repeat colonoscopy in 5 years for surveillance.   - Telephone my office for pathology results in 1 week.   - Discontinue aspirin and NSAIDs for 5 days.  For questions, problems or results please call your physician - Edvin Zuniga MD at Work:  [phone].  [facility] can be reached at [phone]     IF A COMPLICATION OR EMERGENCY SITUATION ARISES AND YOU ARE UNABLE TO REACH   YOUR PHYSICIAN - GO DIRECTLY TO THE EMERGENCY ROOM.  MD Edvin Anderson II, MD  2/18/2020 11:01:15 AM  This report has been verified and signed electronically.  [employer]

## 2020-02-18 NOTE — TRANSFER OF CARE
Anesthesia Transfer of Care Note    Patient: Ava Driscoll    Procedure(s) Performed: Procedure(s) (LRB):  COLONOSCOPY (N/A)    Patient location: GI    Anesthesia Type: general    Transport from OR: Transported from OR on room air with adequate spontaneous ventilation    Post pain: adequate analgesia    Post assessment: no apparent anesthetic complications and tolerated procedure well    Post vital signs: stable    Level of consciousness: awake, alert and oriented    Nausea/Vomiting: no nausea/vomiting    Complications: none    Transfer of care protocol was followed      Last vitals:   Visit Vitals  /65   Pulse 103   Temp 36.8 °C (98.2 °F)   Resp 16   SpO2 100%   Breastfeeding? No

## 2020-02-18 NOTE — LETTER
Endoscopy Scheduling Department   [address] [place] [address]  [place] [address]    12/30/2019  Medical Record # [id number]     Dear Ava Driscoll,    An order for the following procedure, Colonoscopy, was placed for you by Dr. Murphy.  Multiple attempts have been made to reach you at [phone] (home) .     Please call the scheduling nurse at the [facility] to schedule this procedure [phone].     Did you know?   Colorectal cancer, the 2nd most common cancer, is extremely preventable if polyps that lead to cancer are detected and removed, and it is very curable if the cancer is detected in its early stages.   Since there are very few symptoms associated with colorectal cancer, regular screening is essential.   If you are 50 years of age or older, have a family history of polyps or colorectal cancer, or have symptoms such as bleeding, pain, change in bowels, etc. please schedule a screening or evaluation.     If you have already scheduled this appointment, please disregard this letter.    Sincerely,  [facility] [phone]

## 2020-02-18 NOTE — H&P
Pre-Procedure H&P:  Reason for Procedure:  Colon screening    HPI:  Pt is a 49 y.o. female for colonoscopy    Past Medical History:   Diagnosis Date    Behavioral problem     Diabetes mellitus     History of psychiatric care     History of psychiatric hospitalization     Psychiatric problem     Retinopathy due to secondary diabetes     Schizophrenia        Past Surgical History:   Procedure Laterality Date     SECTION      HYSTERECTOMY         Family History   Problem Relation Age of Onset    Arthritis Mother     Diabetes type II Father     Breast cancer Maternal Aunt 80        unilat    Ovarian cancer Neg Hx        Social History     Socioeconomic History    Marital status: Single     Spouse name: Not on file    Number of children: Not on file    Years of education: Not on file    Highest education level: Not on file   Occupational History    Not on file   Social Needs    Financial resource strain: Not on file    Food insecurity:     Worry: Not on file     Inability: Not on file    Transportation needs:     Medical: Not on file     Non-medical: Not on file   Tobacco Use    Smoking status: Never Smoker    Smokeless tobacco: Never Used   Substance and Sexual Activity    Alcohol use: No    Drug use: No    Sexual activity: Never   Lifestyle    Physical activity:     Days per week: Not on file     Minutes per session: Not on file    Stress: Not at all   Relationships    Social connections:     Talks on phone: Not on file     Gets together: Not on file     Attends Hindu service: Not on file     Active member of club or organization: Not on file     Attends meetings of clubs or organizations: Not on file     Relationship status: Not on file   Other Topics Concern    Patient feels they ought to cut down on drinking/drug use Not Asked    Patient annoyed by others criticizing their drinking/drug use Not Asked    Patient has felt bad or guilty about drinking/drug use Not Asked     Patient has had a drink/used drugs as an eye opener in the AM Not Asked   Social History Narrative    Not on file       Endoscopic History:  Denies    Review of patient's allergies indicates:   Allergen Reactions    Ace inhibitors      Cough       No current facility-administered medications on file prior to encounter.      Current Outpatient Medications on File Prior to Encounter   Medication Sig Dispense Refill    ergocalciferol (ERGOCALCIFEROL) 50,000 unit Cap Vitamin D2 1,250 mcg (50,000 unit) capsule   Take 1 capsule every week by oral route.      ibuprofen (ADVIL,MOTRIN) 800 MG tablet       INVEGA SUSTENNA 156 mg/mL Syrg injection       levocetirizine (XYZAL) 5 MG tablet Take 1 tablet (5 mg total) by mouth every evening. 30 tablet 0    methylPREDNISolone (MEDROL DOSEPACK) 4 mg tablet use as directed 1 Package 0    blood sugar diagnostic Strp Check BS once a day. 200 each 3    blood-glucose meter kit Check BS once a day. 1 each 0    lancets (LANCETS,ULTRA THIN) Misc Check BS once a day. 200 each 3       Current Facility-Administered Medications:     0.9%  NaCl infusion, , Intravenous, Continuous, Eric Stuart MD    lidocaine (PF) 10 mg/ml (1%) injection 10 mg, 1 mL, Intradermal, Once, Eric Stuart MD    ROS: Negative x 10    Patient Vitals for the past 24 hrs:   BP Temp Temp src Pulse Resp SpO2   02/18/20 1027 116/80 98.3 °F (36.8 °C) Oral 88 20 98 %       Gen: Well developed, well nourished, no apparent distress  HEENT: Anicteric, PERRLA  CV: S1, S2, no murmers/rubs, non-displaced PMI  Lungs: CTA-B, normal excursion  Abd: Soft, NT, ND, normal BS's, no HSM  Ext: No c/c/e, 1+ DP pulses to BLE's  Neuro: CN II-XII grossly intact, no asterixis.  Skin: No rashes/lesions.  Psych: AA&O x 4    Assessment:  Pt. Is a 49 y.o. female with:  Colon screening    Recommendations:  Colonoscopy      I would like to take this opportunity to thank you for this consult.  If you have any questions or concerns,  please do not hesitate to contact me.

## 2020-02-18 NOTE — DISCHARGE INSTRUCTIONS

## 2020-02-20 LAB
FINAL PATHOLOGIC DIAGNOSIS: NORMAL
GROSS: NORMAL

## 2020-03-04 LAB
LEFT EYE DM RETINOPATHY: NEGATIVE
RIGHT EYE DM RETINOPATHY: NEGATIVE

## 2020-03-19 ENCOUNTER — TELEPHONE (OUTPATIENT)
Dept: FAMILY MEDICINE | Facility: CLINIC | Age: 50
End: 2020-03-19

## 2020-03-19 NOTE — TELEPHONE ENCOUNTER
LOV  12/26/2019  Last refill 1/9/2020 losartan                   1/9/2020 pravastatin    Patient informed of refill approval sent to pharmacy on 1/9/2020.  Patient verbalized understanding, will call pharmacy to get a refill.

## 2020-03-19 NOTE — TELEPHONE ENCOUNTER
----- Message from Nomi Fuentes sent at 3/19/2020  8:36 AM CDT -----  Contact: Ava [phone]  Type: RX Refill Request    Who Called: Ava     Refill or New Rx: refill     RX Name and Strength: pravastatin (PRAVACHOL) 40 MG tablet, losartan (COZAAR) 25 MG tablet    Is this a 30 day or 90 day RX:30 day     Preferred Pharmacy with phone number:[facility] #[id number] - [place]   [address]    Would the patient rather a call back or a response via [facility]? Call back     Best Call Back Number:[phone]

## 2020-04-14 DIAGNOSIS — E11.65 TYPE 2 DIABETES MELLITUS WITH HYPERGLYCEMIA, WITHOUT LONG-TERM CURRENT USE OF INSULIN: ICD-10-CM

## 2020-04-14 RX ORDER — METFORMIN HYDROCHLORIDE 1000 MG/1
TABLET ORAL
Qty: 180 TABLET | Refills: 0 | Status: SHIPPED | OUTPATIENT
Start: 2020-04-14 | End: 2020-12-07 | Stop reason: SDUPTHER

## 2020-04-16 ENCOUNTER — HOSPITAL ENCOUNTER (INPATIENT)
Facility: HOSPITAL | Age: 50
LOS: 5 days | Discharge: HOME | DRG: 177 | End: 2020-04-21
Attending: EMERGENCY MEDICINE | Admitting: INTERNAL MEDICINE
Payer: MEDICARE

## 2020-04-16 DIAGNOSIS — E78.5 HYPERLIPIDEMIA ASSOCIATED WITH TYPE 2 DIABETES MELLITUS: ICD-10-CM

## 2020-04-16 DIAGNOSIS — R73.9 HYPERGLYCEMIA: ICD-10-CM

## 2020-04-16 DIAGNOSIS — E11.69 HYPERLIPIDEMIA ASSOCIATED WITH TYPE 2 DIABETES MELLITUS: ICD-10-CM

## 2020-04-16 DIAGNOSIS — R79.89 ELEVATED BRAIN NATRIURETIC PEPTIDE (BNP) LEVEL: ICD-10-CM

## 2020-04-16 DIAGNOSIS — J96.01 ACUTE RESPIRATORY FAILURE WITH HYPOXIA: ICD-10-CM

## 2020-04-16 DIAGNOSIS — Z75.8 DISCHARGE PLANNING ISSUES: ICD-10-CM

## 2020-04-16 DIAGNOSIS — U07.1 COVID-19 VIRUS INFECTION: ICD-10-CM

## 2020-04-16 DIAGNOSIS — E11.65 TYPE 2 DIABETES MELLITUS WITH HYPERGLYCEMIA, WITHOUT LONG-TERM CURRENT USE OF INSULIN: ICD-10-CM

## 2020-04-16 DIAGNOSIS — R06.02 SOB (SHORTNESS OF BREATH): ICD-10-CM

## 2020-04-16 DIAGNOSIS — N17.9 AKI (ACUTE KIDNEY INJURY): ICD-10-CM

## 2020-04-16 DIAGNOSIS — J12.82 PNEUMONIA DUE TO COVID-19 VIRUS: Primary | ICD-10-CM

## 2020-04-16 DIAGNOSIS — F20.0 PARANOID SCHIZOPHRENIA: ICD-10-CM

## 2020-04-16 DIAGNOSIS — E87.20 LACTIC ACIDOSIS: ICD-10-CM

## 2020-04-16 DIAGNOSIS — E87.1 HYPONATREMIA WITH EXTRACELLULAR FLUID DEPLETION: ICD-10-CM

## 2020-04-16 DIAGNOSIS — U07.1 PNEUMONIA DUE TO COVID-19 VIRUS: Primary | ICD-10-CM

## 2020-04-16 DIAGNOSIS — R09.02 HYPOXIA: ICD-10-CM

## 2020-04-16 DIAGNOSIS — E55.9 VITAMIN D DEFICIENCY: ICD-10-CM

## 2020-04-16 LAB
ALBUMIN SERPL BCP-MCNC: 2 G/DL (ref 3.5–5.2)
ALBUMIN SERPL BCP-MCNC: 2.2 G/DL (ref 3.5–5.2)
ALLENS TEST: ABNORMAL
ALP SERPL-CCNC: 118 U/L (ref 55–135)
ALP SERPL-CCNC: 136 U/L (ref 55–135)
ALT SERPL W/O P-5'-P-CCNC: 31 U/L (ref 10–44)
ALT SERPL W/O P-5'-P-CCNC: 35 U/L (ref 10–44)
ANION GAP SERPL CALC-SCNC: 11 MMOL/L (ref 8–16)
ANION GAP SERPL CALC-SCNC: 16 MMOL/L (ref 8–16)
AST SERPL-CCNC: 29 U/L (ref 10–40)
AST SERPL-CCNC: 35 U/L (ref 10–40)
B-OH-BUTYR BLD STRIP-SCNC: 0.9 MMOL/L (ref 0–0.5)
BASOPHILS # BLD AUTO: ABNORMAL K/UL (ref 0–0.2)
BASOPHILS NFR BLD: 0 % (ref 0–1.9)
BILIRUB SERPL-MCNC: 0.6 MG/DL (ref 0.1–1)
BILIRUB SERPL-MCNC: 0.8 MG/DL (ref 0.1–1)
BNP SERPL-MCNC: 322 PG/ML (ref 0–99)
BUN SERPL-MCNC: 55 MG/DL (ref 6–20)
BUN SERPL-MCNC: 63 MG/DL (ref 6–20)
CALCIUM SERPL-MCNC: 7.7 MG/DL (ref 8.7–10.5)
CALCIUM SERPL-MCNC: 8 MG/DL (ref 8.7–10.5)
CHLORIDE SERPL-SCNC: 92 MMOL/L (ref 95–110)
CHLORIDE SERPL-SCNC: 97 MMOL/L (ref 95–110)
CK SERPL-CCNC: 183 U/L (ref 20–180)
CO2 SERPL-SCNC: 21 MMOL/L (ref 23–29)
CO2 SERPL-SCNC: 24 MMOL/L (ref 23–29)
CREAT SERPL-MCNC: 1.7 MG/DL (ref 0.5–1.4)
CREAT SERPL-MCNC: 2.3 MG/DL (ref 0.5–1.4)
CRP SERPL-MCNC: 116.3 MG/L (ref 0–8.2)
DELSYS: ABNORMAL
DIFFERENTIAL METHOD: ABNORMAL
EOSINOPHIL # BLD AUTO: ABNORMAL K/UL (ref 0–0.5)
EOSINOPHIL NFR BLD: 3 % (ref 0–8)
ERYTHROCYTE [DISTWIDTH] IN BLOOD BY AUTOMATED COUNT: 16.7 % (ref 11.5–14.5)
EST. GFR  (AFRICAN AMERICAN): 28 ML/MIN/1.73 M^2
EST. GFR  (AFRICAN AMERICAN): 40 ML/MIN/1.73 M^2
EST. GFR  (NON AFRICAN AMERICAN): 24 ML/MIN/1.73 M^2
EST. GFR  (NON AFRICAN AMERICAN): 35 ML/MIN/1.73 M^2
FERRITIN SERPL-MCNC: 214 NG/ML (ref 20–300)
FLOW: 4
GLUCOSE SERPL-MCNC: 492 MG/DL (ref 70–110)
GLUCOSE SERPL-MCNC: 576 MG/DL (ref 70–110)
HCO3 UR-SCNC: 24.2 MMOL/L (ref 24–28)
HCT VFR BLD AUTO: 36.9 % (ref 37–48.5)
HGB BLD-MCNC: 11.7 G/DL (ref 12–16)
IMM GRANULOCYTES # BLD AUTO: ABNORMAL K/UL (ref 0–0.04)
IMM GRANULOCYTES NFR BLD AUTO: ABNORMAL % (ref 0–0.5)
LACTATE SERPL-SCNC: 1.1 MMOL/L (ref 0.5–2.2)
LACTATE SERPL-SCNC: 5 MMOL/L (ref 0.5–2.2)
LDH SERPL L TO P-CCNC: 1011 U/L (ref 110–260)
LYMPHOCYTES # BLD AUTO: ABNORMAL K/UL (ref 1–4.8)
LYMPHOCYTES NFR BLD: 25 % (ref 18–48)
MCH RBC QN AUTO: 31.9 PG (ref 27–31)
MCHC RBC AUTO-ENTMCNC: 31.7 G/DL (ref 32–36)
MCV RBC AUTO: 101 FL (ref 82–98)
METAMYELOCYTES NFR BLD MANUAL: 7 %
MODE: ABNORMAL
MONOCYTES # BLD AUTO: ABNORMAL K/UL (ref 0.3–1)
MONOCYTES NFR BLD: 3 % (ref 4–15)
NEUTROPHILS NFR BLD: 61 % (ref 38–73)
NEUTS BAND NFR BLD MANUAL: 1 %
NRBC BLD-RTO: 6 /100 WBC
PCO2 BLDA: 45.3 MMHG (ref 35–45)
PH SMN: 7.34 [PH] (ref 7.35–7.45)
PLATELET # BLD AUTO: 233 K/UL (ref 150–350)
PMV BLD AUTO: 10.9 FL (ref 9.2–12.9)
PO2 BLDA: 23 MMHG (ref 40–60)
POC BE: -2 MMOL/L
POC SATURATED O2: 35 % (ref 95–100)
POC TCO2: 26 MMOL/L (ref 24–29)
POCT GLUCOSE: 412 MG/DL (ref 70–110)
POCT GLUCOSE: 474 MG/DL (ref 70–110)
POTASSIUM SERPL-SCNC: 4.9 MMOL/L (ref 3.5–5.1)
POTASSIUM SERPL-SCNC: 5 MMOL/L (ref 3.5–5.1)
PROCALCITONIN SERPL IA-MCNC: 0.35 NG/ML
PROT SERPL-MCNC: 7 G/DL (ref 6–8.4)
PROT SERPL-MCNC: 8 G/DL (ref 6–8.4)
RBC # BLD AUTO: 3.67 M/UL (ref 4–5.4)
SAMPLE: ABNORMAL
SARS-COV-2 RDRP RESP QL NAA+PROBE: POSITIVE
SITE: ABNORMAL
SODIUM SERPL-SCNC: 129 MMOL/L (ref 136–145)
SODIUM SERPL-SCNC: 132 MMOL/L (ref 136–145)
TROPONIN I SERPL DL<=0.01 NG/ML-MCNC: 0.02 NG/ML (ref 0–0.03)
WBC # BLD AUTO: 14.44 K/UL (ref 3.9–12.7)

## 2020-04-16 PROCEDURE — C9399 UNCLASSIFIED DRUGS OR BIOLOG: HCPCS | Performed by: INTERNAL MEDICINE

## 2020-04-16 PROCEDURE — 94761 N-INVAS EAR/PLS OXIMETRY MLT: CPT

## 2020-04-16 PROCEDURE — 85027 COMPLETE CBC AUTOMATED: CPT

## 2020-04-16 PROCEDURE — 86140 C-REACTIVE PROTEIN: CPT

## 2020-04-16 PROCEDURE — 87040 BLOOD CULTURE FOR BACTERIA: CPT | Mod: 59

## 2020-04-16 PROCEDURE — 82803 BLOOD GASES ANY COMBINATION: CPT

## 2020-04-16 PROCEDURE — U0002 COVID-19 LAB TEST NON-CDC: HCPCS

## 2020-04-16 PROCEDURE — A4216 STERILE WATER/SALINE, 10 ML: HCPCS | Performed by: INTERNAL MEDICINE

## 2020-04-16 PROCEDURE — 83605 ASSAY OF LACTIC ACID: CPT | Mod: 91

## 2020-04-16 PROCEDURE — 84484 ASSAY OF TROPONIN QUANT: CPT

## 2020-04-16 PROCEDURE — 96361 HYDRATE IV INFUSION ADD-ON: CPT

## 2020-04-16 PROCEDURE — 99900035 HC TECH TIME PER 15 MIN (STAT)

## 2020-04-16 PROCEDURE — 83605 ASSAY OF LACTIC ACID: CPT

## 2020-04-16 PROCEDURE — 85007 BL SMEAR W/DIFF WBC COUNT: CPT

## 2020-04-16 PROCEDURE — 82728 ASSAY OF FERRITIN: CPT

## 2020-04-16 PROCEDURE — 96374 THER/PROPH/DIAG INJ IV PUSH: CPT | Mod: 59

## 2020-04-16 PROCEDURE — 83615 LACTATE (LD) (LDH) ENZYME: CPT

## 2020-04-16 PROCEDURE — 99291 PR CRITICAL CARE, E/M 30-74 MINUTES: ICD-10-PCS | Mod: ,,, | Performed by: INTERNAL MEDICINE

## 2020-04-16 PROCEDURE — 80053 COMPREHEN METABOLIC PANEL: CPT

## 2020-04-16 PROCEDURE — 99291 CRITICAL CARE FIRST HOUR: CPT | Mod: ,,, | Performed by: INTERNAL MEDICINE

## 2020-04-16 PROCEDURE — 96375 TX/PRO/DX INJ NEW DRUG ADDON: CPT

## 2020-04-16 PROCEDURE — 96365 THER/PROPH/DIAG IV INF INIT: CPT

## 2020-04-16 PROCEDURE — 99291 CRITICAL CARE FIRST HOUR: CPT | Mod: 25

## 2020-04-16 PROCEDURE — 84145 PROCALCITONIN (PCT): CPT

## 2020-04-16 PROCEDURE — 83930 ASSAY OF BLOOD OSMOLALITY: CPT

## 2020-04-16 PROCEDURE — 93010 ELECTROCARDIOGRAM REPORT: CPT | Mod: ,,, | Performed by: INTERNAL MEDICINE

## 2020-04-16 PROCEDURE — 80053 COMPREHEN METABOLIC PANEL: CPT | Mod: 91

## 2020-04-16 PROCEDURE — 25000003 PHARM REV CODE 250: Performed by: INTERNAL MEDICINE

## 2020-04-16 PROCEDURE — 82010 KETONE BODYS QUAN: CPT

## 2020-04-16 PROCEDURE — 82550 ASSAY OF CK (CPK): CPT

## 2020-04-16 PROCEDURE — 63600175 PHARM REV CODE 636 W HCPCS: Performed by: EMERGENCY MEDICINE

## 2020-04-16 PROCEDURE — 36415 COLL VENOUS BLD VENIPUNCTURE: CPT

## 2020-04-16 PROCEDURE — 83880 ASSAY OF NATRIURETIC PEPTIDE: CPT

## 2020-04-16 PROCEDURE — 36569 INSJ PICC 5 YR+ W/O IMAGING: CPT

## 2020-04-16 PROCEDURE — 63600175 PHARM REV CODE 636 W HCPCS: Performed by: INTERNAL MEDICINE

## 2020-04-16 PROCEDURE — 93010 EKG 12-LEAD: ICD-10-PCS | Mod: ,,, | Performed by: INTERNAL MEDICINE

## 2020-04-16 PROCEDURE — 93005 ELECTROCARDIOGRAM TRACING: CPT

## 2020-04-16 PROCEDURE — 25000003 PHARM REV CODE 250: Performed by: EMERGENCY MEDICINE

## 2020-04-16 PROCEDURE — 20000000 HC ICU ROOM

## 2020-04-16 RX ORDER — PANTOPRAZOLE SODIUM 40 MG/10ML
40 INJECTION, POWDER, LYOPHILIZED, FOR SOLUTION INTRAVENOUS DAILY
Status: DISCONTINUED | OUTPATIENT
Start: 2020-04-16 | End: 2020-04-16

## 2020-04-16 RX ORDER — SODIUM CHLORIDE 0.9 % (FLUSH) 0.9 %
10 SYRINGE (ML) INJECTION
Status: DISCONTINUED | OUTPATIENT
Start: 2020-04-16 | End: 2020-04-21 | Stop reason: HOSPADM

## 2020-04-16 RX ORDER — ONDANSETRON 2 MG/ML
4 INJECTION INTRAMUSCULAR; INTRAVENOUS EVERY 8 HOURS PRN
Status: DISCONTINUED | OUTPATIENT
Start: 2020-04-16 | End: 2020-04-21 | Stop reason: HOSPADM

## 2020-04-16 RX ORDER — ACETAMINOPHEN 325 MG/1
650 TABLET ORAL EVERY 8 HOURS PRN
Status: DISCONTINUED | OUTPATIENT
Start: 2020-04-16 | End: 2020-04-21 | Stop reason: HOSPADM

## 2020-04-16 RX ORDER — FAMOTIDINE 10 MG/ML
20 INJECTION INTRAVENOUS DAILY
Status: DISCONTINUED | OUTPATIENT
Start: 2020-04-17 | End: 2020-04-18

## 2020-04-16 RX ORDER — INSULIN ASPART 100 [IU]/ML
1-10 INJECTION, SOLUTION INTRAVENOUS; SUBCUTANEOUS
Status: DISCONTINUED | OUTPATIENT
Start: 2020-04-16 | End: 2020-04-21 | Stop reason: HOSPADM

## 2020-04-16 RX ORDER — GLUCAGON 1 MG
1 KIT INJECTION
Status: DISCONTINUED | OUTPATIENT
Start: 2020-04-16 | End: 2020-04-21 | Stop reason: HOSPADM

## 2020-04-16 RX ORDER — IBUPROFEN 200 MG
16 TABLET ORAL
Status: DISCONTINUED | OUTPATIENT
Start: 2020-04-16 | End: 2020-04-21 | Stop reason: HOSPADM

## 2020-04-16 RX ORDER — SODIUM CHLORIDE 9 MG/ML
1000 INJECTION, SOLUTION INTRAVENOUS
Status: COMPLETED | OUTPATIENT
Start: 2020-04-16 | End: 2020-04-16

## 2020-04-16 RX ORDER — AZITHROMYCIN 250 MG/1
250 TABLET, FILM COATED ORAL DAILY
Status: COMPLETED | OUTPATIENT
Start: 2020-04-17 | End: 2020-04-20

## 2020-04-16 RX ORDER — SODIUM CHLORIDE 0.9 % (FLUSH) 0.9 %
10 SYRINGE (ML) INJECTION EVERY 6 HOURS
Status: DISCONTINUED | OUTPATIENT
Start: 2020-04-16 | End: 2020-04-21 | Stop reason: HOSPADM

## 2020-04-16 RX ORDER — IBUPROFEN 200 MG
24 TABLET ORAL
Status: DISCONTINUED | OUTPATIENT
Start: 2020-04-16 | End: 2020-04-21 | Stop reason: HOSPADM

## 2020-04-16 RX ADMIN — CEFTRIAXONE 2 G: 2 INJECTION, SOLUTION INTRAVENOUS at 12:04

## 2020-04-16 RX ADMIN — INSULIN ASPART 5 UNITS: 100 INJECTION, SOLUTION INTRAVENOUS; SUBCUTANEOUS at 09:04

## 2020-04-16 RX ADMIN — Medication 10 ML: at 11:04

## 2020-04-16 RX ADMIN — SODIUM CHLORIDE 1000 ML: 0.9 INJECTION, SOLUTION INTRAVENOUS at 12:04

## 2020-04-16 RX ADMIN — AZITHROMYCIN 500 MG: 500 INJECTION, POWDER, LYOPHILIZED, FOR SOLUTION INTRAVENOUS at 01:04

## 2020-04-16 RX ADMIN — Medication 10 ML: at 06:04

## 2020-04-16 RX ADMIN — METHYLPREDNISOLONE SODIUM SUCCINATE 40 MG: 40 INJECTION, POWDER, FOR SOLUTION INTRAMUSCULAR; INTRAVENOUS at 05:04

## 2020-04-16 RX ADMIN — INSULIN DETEMIR 20 UNITS: 100 INJECTION, SOLUTION SUBCUTANEOUS at 05:04

## 2020-04-16 RX ADMIN — INSULIN HUMAN 6 UNITS: 100 INJECTION, SOLUTION PARENTERAL at 01:04

## 2020-04-16 NOTE — SUBJECTIVE & OBJECTIVE
Past Medical History:   Diagnosis Date    Behavioral problem     Diabetes mellitus     History of psychiatric care     History of psychiatric hospitalization     Psychiatric problem     Retinopathy due to secondary diabetes     Schizophrenia        Past Surgical History:   Procedure Laterality Date     SECTION      COLONOSCOPY N/A 2020    Procedure: COLONOSCOPY;  Surgeon: Edvin Zuniga II, MD;  Location: [facility];  Service: Endoscopy;  Laterality: N/A;    HYSTERECTOMY         Review of patient's allergies indicates:   Allergen Reactions    Ace inhibitors      Cough       Family History     Problem Relation (Age of Onset)    Arthritis Mother    Breast cancer Maternal Aunt (80)    Diabetes type II Father        Tobacco Use    Smoking status: Never Smoker    Smokeless tobacco: Never Used   Substance and Sexual Activity    Alcohol use: No    Drug use: No    Sexual activity: Never         Review of Systems   Constitutional: Positive for activity change. Negative for fatigue and fever.   Respiratory: Positive for cough and shortness of breath.      Objective:     Vital Signs (Most Recent):  Temp: 99.5 °F (37.5 °C) (20 1600)  Pulse: 91 (20 1630)  Resp: (!) 30 (20 1615)  BP: 110/85 (20 1630)  SpO2: (!) 94 % (20 1630) Vital Signs (24h Range):  Temp:  [98.5 °F (36.9 °C)-99.5 °F (37.5 °C)] 99.5 °F (37.5 °C)  Pulse:  [] 91  Resp:  [22-41] 30  SpO2:  [72 %-100 %] 94 %  BP: ()/(62-85) 110/85     Weight: 136.1 kg (300 lb)  Body mass index is 51.49 kg/m².      Intake/Output Summary (Last 24 hours) at 2020 1640  Last data filed at 2020 1318  Gross per 24 hour   Intake 300 ml   Output --   Net 300 ml       Physical Exam   Constitutional: She is oriented to person, place, and time. She appears well-developed and well-nourished. No distress.   Eyes: Pupils are equal, round, and reactive to light. EOM are normal.   Cardiovascular: Normal rate and  regular rhythm.   Pulmonary/Chest:   wheezing   Abdominal: Soft. Bowel sounds are normal.   Neurological: She is alert and oriented to person, place, and time.   Skin: Skin is warm and dry. She is not diaphoretic.   Nursing note and vitals reviewed.      Vents:       Lines/Drains/Airways     Peripherally Inserted Central Catheter Line            PICC Triple Lumen 04/16/20 1605 right basilic less than 1 day          Peripheral Intravenous Line                 Peripheral IV - Single Lumen 04/16/20 1131 20 G Left Antecubital less than 1 day         Peripheral IV - Single Lumen 04/16/20 1200 18 G;1 3/4 in Left Upper Arm less than 1 day                Significant Labs:    CBC/Anemia Profile:  Recent Labs   Lab 04/16/20  1131   WBC 14.44*   HGB 11.7*   HCT 36.9*      *   RDW 16.7*   FERRITIN 214        Chemistries:  Recent Labs   Lab 04/16/20  1131   *   K 4.9   CL 92*   CO2 21*   BUN 63*   CREATININE 2.3*   CALCIUM 8.0*   ALBUMIN 2.2*   PROT 8.0   BILITOT 0.8   ALKPHOS 136*   ALT 35   AST 35       All pertinent labs within the past 24 hours have been reviewed.    Significant Imaging:   I have reviewed and interpreted all pertinent imaging results/findings within the past 24 hours.

## 2020-04-16 NOTE — H&P
"[facility] Hospital Medicine  History & Physical    Patient Name: Ava Driscoll  MRN: [id number]  Admission Date: 2020  Attending Physician: Quinten Dumont MD   Primary Care Provider: Keerthi Murphy MD         Patient information was obtained from patient, past medical records and ER records.     Subjective:     Principal Problem:Acute respiratory failure with hypoxia    Chief Complaint:   Chief Complaint   Patient presents with    Shortness of Breath     Patient reports shortness of breath, worsening over past few days. Denies fever. Also reports productive cough. No meds taken.         HPI: 49 year old female with diabetes mellitus type 2, obesity, hyperlipidemia, shchizophrenia and hypertension who presented with shortness of breath and cough "for the past couple of days". Feels likes symptoms have worsened overtime. Denied fever, chills, chest pain, abdominal pain, nausea/vomiting or diarrhea. Patient states compliance with her medications, including the ones for diabetes. Is not on insulin.    In the ED, patient was tachypneic and hypoxic to the 70s at room air. Afebrile. Labwork significant for     Past Medical History:   Diagnosis Date    Behavioral problem     Diabetes mellitus     History of psychiatric care     History of psychiatric hospitalization     Psychiatric problem     Retinopathy due to secondary diabetes     Schizophrenia        Past Surgical History:   Procedure Laterality Date     SECTION      COLONOSCOPY N/A 2020    Procedure: COLONOSCOPY;  Surgeon: Edvin Zuniga II, MD;  Location: [facility];  Service: Endoscopy;  Laterality: N/A;    HYSTERECTOMY         Review of patient's allergies indicates:   Allergen Reactions    Ace inhibitors      Cough       No current facility-administered medications on file prior to encounter.      Current Outpatient Medications on File Prior to Encounter   Medication Sig    ergocalciferol (ERGOCALCIFEROL) 50,000 " unit Cap Vitamin D2 1,250 mcg (50,000 unit) capsule   Take 1 capsule every week by oral route.    glimepiride (AMARYL) 4 MG tablet Take 1 tablet (4 mg total) by mouth before breakfast.    levocetirizine (XYZAL) 5 MG tablet Take 1 tablet (5 mg total) by mouth every evening.    losartan (COZAAR) 25 MG tablet Take 1 tablet (25 mg total) by mouth once daily.    metFORMIN (GLUCOPHAGE) 1000 MG tablet TAKE ONE TABLET BY MOUTH TWICE DAILY WITH MEALS    pravastatin (PRAVACHOL) 40 MG tablet Take 1 tablet (40 mg total) by mouth once daily.    blood sugar diagnostic Strp Check BS once a day.    blood-glucose meter kit Check BS once a day.    ibuprofen (ADVIL,MOTRIN) 800 MG tablet     INVEGA SUSTENNA 156 mg/mL Syrg injection     lancets (LANCETS,ULTRA THIN) Misc Check BS once a day.    methylPREDNISolone (MEDROL DOSEPACK) 4 mg tablet use as directed     Family History     Problem Relation (Age of Onset)    Arthritis Mother    Breast cancer Maternal Aunt (80)    Diabetes type II Father        Tobacco Use    Smoking status: Never Smoker    Smokeless tobacco: Never Used   Substance and Sexual Activity    Alcohol use: No    Drug use: No    Sexual activity: Never     Review of Systems   Constitutional: Negative.    HENT: Negative.    Eyes: Negative.    Respiratory: Positive for cough and shortness of breath.    Cardiovascular: Negative.    Gastrointestinal: Negative.    Endocrine: Negative.    Genitourinary: Negative.    Musculoskeletal: Negative.    Skin: Negative.    Neurological: Negative.    Hematological: Negative.    Psychiatric/Behavioral: Negative.      Objective:     Vital Signs (Most Recent):  Temp: 99.5 °F (37.5 °C) (04/16/20 1600)  Pulse: 91 (04/16/20 1630)  Resp: (!) 30 (04/16/20 1615)  BP: 110/85 (04/16/20 1630)  SpO2: (!) 94 % (04/16/20 1630) Vital Signs (24h Range):  Temp:  [98.5 °F (36.9 °C)-99.5 °F (37.5 °C)] 99.5 °F (37.5 °C)  Pulse:  [] 91  Resp:  [22-41] 30  SpO2:  [72 %-100 %] 94 %  BP:  ()/(62-85) 110/85     Weight: 136.1 kg (300 lb)  Body mass index is 51.49 kg/m².    Physical Exam   Constitutional: She is oriented to person, place, and time. She appears well-developed. No distress.   HENT:   Head: Normocephalic and atraumatic.   Eyes: Conjunctivae and EOM are normal.   Neck: Normal range of motion.   Cardiovascular:   Sinus tachycardia   Pulmonary/Chest:   Using accessory muscles with minimal activity  On low flow NC   Abdominal: Soft.   Musculoskeletal: She exhibits no edema.   Neurological: She is alert and oriented to person, place, and time.   Skin: Skin is warm. Capillary refill takes less than 2 seconds. She is not diaphoretic.   Psychiatric: She has a normal mood and affect. Her behavior is normal. Judgment and thought content normal.   Nursing note and vitals reviewed.        CRANIAL NERVES     CN III, IV, VI   Extraocular motions are normal.        Significant Labs: All pertinent labs within the past 24 hours have been reviewed.    Significant Imaging: I have reviewed all pertinent imaging results/findings within the past 24 hours.  I have reviewed and interpreted all pertinent imaging results/findings within the past 24 hours.    Assessment/Plan:     * Acute respiratory failure with hypoxia  2/2 ARDS associated to COVID-19  Adequate O2 saturation on 4-5 L low flow NC but unable to speak mid to full sentences and using accessory muscles with minimal activity. Received isotonic fluids for dehydration and hyperglycemia. This may worsen respiratory status but patient did need this.   Agree with ICU admission for close observation as she is at high risk for decompensation.  Agree with empiric antibiotics given elevated procalcitonin which could be indicative of superimposed bacterial infection  Treat hyperglycemia  BNP elevated to 322. Will obtain echocardiogram.  Pulmonology consult placed        COVID-19 virus infection  - COVID-19 testing   - Infection Control notified     -  Isolation:   - Airborne, Contact and Droplet Precautions  - Cohort patients into COVID units  - N95 masks must be fit tested, wear eye protection  - 20 second hand hygiene              - Limit visitors per hospital policy              - Consolidating lab draws, nursing care, provider visits, and interventions    - Diagnostics: (leukopenia, hyponatremia, hyperferritinemia, elevated troponin, elevated d-dimer, age, and comorbidities are significant predictors of poor clinical outcome)    - Management:  Supplemental O2 to maintain SpO2 >92%  Continuous/intermittent Pulse Ox  Albuterol treatment PRN    Advance Care Planning   Full             Pneumonia due to COVID-19 virus  As above      Lactic acidosis  2/2 dehydration  Will recheck      Hyponatremia with extracellular fluid depletion  Likely due to dehydration  S/p fluids  Will repeat CMP      Type 2 diabetes mellitus with hyperglycemia, without long-term current use of insulin  With hyperglycemia  No HNKH or DKA  Short acting insulin given in the ED  Will recheck glucose  HgbA1c pending  Hold oral home meds      BMI 50.0-59.9, adult  Will discuss weight loss when appropriate      Paranoid schizophrenia  No acute issues  Not on any meds        VTE Risk Mitigation (From admission, onward)    None             Shanon Shelby MD  Department of Hospital Medicine   [facility]

## 2020-04-16 NOTE — PROGRESS NOTES
Called patient via telephone to complete discharge planning assessment.  SW will f/u with patient later as she was still in ED not feeling well. SW spoke with patient's mother, Clarisa, to obtain information for an assessment.  Patient's mother asked SW to call patient's sister, Lakeisha ([phone]) for assistance completing patient's assessment.  Hailey Gallego LMSW, [facility]  4/16/20

## 2020-04-16 NOTE — ED TRIAGE NOTES
Pt from triage with c/o sob and low oxygen with some confusion. Pt tachypnic, she says her mother brought her. Only complaint is sob

## 2020-04-16 NOTE — ASSESSMENT & PLAN NOTE
With hyperglycemia  No HNKH or DKA  Short acting insulin given in the ED  Will recheck glucose  HgbA1c pending  Hold oral home meds

## 2020-04-16 NOTE — ED PROVIDER NOTES
Encounter Date: 2020    SCRIBE #1 NOTE: I, Miko Goldstein, am scribing for, and in the presence of,  Quinten Dumont MD. I have scribed the following portions of the note - Other sections scribed: HPI, ROS, PE, MDM.       History     Chief Complaint   Patient presents with    Shortness of Breath     Patient reports shortness of breath, worsening over past few days. Denies fever. Also reports productive cough. No meds taken.      Ava Driscoll is a 49 y.o. female who presents to the ED for evaluation of shortness of breath that has been worsening over the past few days. Patient arrives hypoxic in the 70s at triage.     The history is limited by the condition of the patient.     Review of patient's allergies indicates:   Allergen Reactions    Ace inhibitors      Cough     Past Medical History:   Diagnosis Date    Behavioral problem     Diabetes mellitus     History of psychiatric care     History of psychiatric hospitalization     Psychiatric problem     Retinopathy due to secondary diabetes     Schizophrenia      Past Surgical History:   Procedure Laterality Date     SECTION      COLONOSCOPY N/A 2020    Procedure: COLONOSCOPY;  Surgeon: Edvin Zuniga II, MD;  Location: [facility];  Service: Endoscopy;  Laterality: N/A;    HYSTERECTOMY       Family History   Problem Relation Age of Onset    Arthritis Mother     Diabetes type II Father     Breast cancer Maternal Aunt 80        unilat    Ovarian cancer Neg Hx      Social History     Tobacco Use    Smoking status: Never Smoker    Smokeless tobacco: Never Used   Substance Use Topics    Alcohol use: No    Drug use: No     Review of Systems   Unable to perform ROS: Acuity of condition       Physical Exam     Initial Vitals   BP Pulse Resp Temp SpO2   20 1120 20 1202 20 1120 20 1202 20 1120   107/65 101 (!) 26 98.7 °F (37.1 °C) (!) 72 %      MAP       --                Physical Exam    Nursing note and vitals  reviewed.  Constitutional: She is not diaphoretic. She appears distressed.   HENT:   Head: Normocephalic and atraumatic.   Mouth/Throat: Oropharynx is clear and moist.   Eyes: EOM are normal. Pupils are equal, round, and reactive to light. No scleral icterus.   Neck: Normal range of motion. Neck supple. No JVD present.   Cardiovascular: Normal rate, regular rhythm and intact distal pulses.   Pulmonary/Chest: No stridor. She is in respiratory distress.   Coarse breath sounds at bilateral bases   Abdominal: Soft. She exhibits no distension. There is no tenderness. There is no rebound and no guarding.   Musculoskeletal: Normal range of motion. She exhibits edema (Symmetrical lower extremity edema). She exhibits no tenderness.   Neurological: She is alert. She has normal strength. No cranial nerve deficit or sensory deficit. GCS score is 15. GCS eye subscore is 4. GCS verbal subscore is 5. GCS motor subscore is 6.   Skin: Skin is warm and dry.         ED Course   Critical Care  Date/Time: 4/16/2020 1:00 PM  Performed by: Quinten Dumont MD  Authorized by: Quinten Dumont MD   Total critical care time (exclusive of procedural time) : 60 minutes  Critical care time was exclusive of separately billable procedures and treating other patients and teaching time.  Critical care was necessary to treat or prevent imminent or life-threatening deterioration of the following conditions: shock, sepsis and renal failure.  Critical care was time spent personally by me on the following activities: blood draw for specimens, development of treatment plan with patient or surrogate, discussions with primary provider, evaluation of patient's response to treatment, examination of patient, obtaining history from patient or surrogate, ordering and performing treatments and interventions, ordering and review of laboratory studies, ordering and review of radiographic studies, pulse oximetry, re-evaluation of patient's condition and review of  old charts.        Labs Reviewed   CBC W/ AUTO DIFFERENTIAL - Abnormal; Notable for the following components:       Result Value    WBC 14.44 (*)     RBC 3.67 (*)     Hemoglobin 11.7 (*)     Hematocrit 36.9 (*)     Mean Corpuscular Volume 101 (*)     Mean Corpuscular Hemoglobin 31.9 (*)     Mean Corpuscular Hemoglobin Conc 31.7 (*)     RDW 16.7 (*)     nRBC 6 (*)     Mono% 3.0 (*)     All other components within normal limits   COMPREHENSIVE METABOLIC PANEL - Abnormal; Notable for the following components:    Sodium 129 (*)     Chloride 92 (*)     CO2 21 (*)     Glucose 576 (*)     BUN, Bld 63 (*)     Creatinine 2.3 (*)     Calcium 8.0 (*)     Albumin 2.2 (*)     Alkaline Phosphatase 136 (*)     eGFR if  28 (*)     eGFR if non  24 (*)     All other components within normal limits    Narrative:     GLUCOSE critical result(s) called and verbal readback obtained from   [email or web address]  [place] [facility]16[place] 12:23   C-REACTIVE PROTEIN - Abnormal; Notable for the following components:    .3 (*)     All other components within normal limits   LACTATE DEHYDROGENASE - Abnormal; Notable for the following components:    LD 1,011 (*)     All other components within normal limits    Narrative:     [place] [id number] [place] [facility] [place]16/2020 12:29, reason: Specimen   hemolyzed; Tube has been refrigerated; CALLED TO [email or web address]  04/16/2020  12:29   CK - Abnormal; Notable for the following components:     (*)     All other components within normal limits   LACTIC ACID, PLASMA - Abnormal; Notable for the following components:    Lactate (Lactic Acid) 5.0 (*)     All other components within normal limits    Narrative:     LACTIC ACID critical result(s) called and verbal readback obtained   from [email or web address] [place] [facility]16/[place] 12:22   SARS-COV-2 RNA AMPLIFICATION, QUAL - Abnormal; Notable for the following components:    SARS-CoV-2 RNA, Amplification, Qual Positive (*)      All other components within normal limits    Narrative:     What symptom criteria does the patient meet?->Difficulty  breathing   PROCALCITONIN - Abnormal; Notable for the following components:    Procalcitonin 0.35 (*)     All other components within normal limits   B-TYPE NATRIURETIC PEPTIDE - Abnormal; Notable for the following components:     (*)     All other components within normal limits   BETA - HYDROXYBUTYRATE, SERUM - Abnormal; Notable for the following components:    Beta-Hydroxybutyrate 0.9 (*)     All other components within normal limits   ISTAT PROCEDURE - Abnormal; Notable for the following components:    POC PH 7.336 (*)     POC PCO2 45.3 (*)     POC PO2 23 (*)     POC SATURATED O2 35 (*)     All other components within normal limits   CULTURE, BLOOD   CULTURE, BLOOD   FERRITIN   TROPONIN I   OSMOLALITY, SERUM   OSMOLALITY, SERUM   POCT GLUCOSE MONITORING CONTINUOUS   POCT GLUCOSE MONITORING CONTINUOUS     EKG Readings: (Independently Interpreted)   Initial Reading: No STEMI. Rhythm: Normal Sinus Rhythm. Heart Rate: 99. Ectopy: No Ectopy. Conduction: Normal. ST Segments: Normal ST Segments. T Waves Flipped: III, AVF, V2, V3 and V4. Axis: Normal.     ECG Results          EKG 12-lead (Final result)  Result time 04/16/20 17:01:53    Final result by Interface, Lab In [facility] (04/16/20 17:01:53)                 Narrative:    Test Reason : R06.02,    Vent. Rate : 104 BPM     Atrial Rate : 104 BPM     P-R Int : 194 ms          QRS Dur : 084 ms      QT Int : 358 ms       P-R-T Axes : 000 096 045 degrees     QTc Int : 470 ms    Sinus tachycardia  Rightward axis  Low voltage QRS  Nonspecific ST and T wave abnormality  Abnormal ECG  When compared with ECG of 08-JUL-2016 02:00,  Significant changes have occurred  Confirmed by Garrett Danielle MD ([id number]) on 4/16/2020 5:01:39 PM    Referred By: [email or web address]   SELF           Confirmed By:Garrett Danielle MD                            Imaging Results           X-Ray  Chest AP Portable (Final result)  Result time 04/16/20 12:32:30    Final result by Du Hunt MD (04/16/20 12:32:30)                 Impression:      1. Right greater than left ill-defined ground-glass and more focal nodular consolidative airspace opacities throughout the bilateral lung fields concerning for viral chest infection.  This report was flagged in Epic as abnormal.      Electronically signed by: Du Hunt  Date:    04/16/2020  Time:    12:32             Narrative:    EXAMINATION:  XR CHEST AP PORTABLE    CLINICAL HISTORY:  SOB;    TECHNIQUE:  Single frontal portable view of the chest was performed.    COMPARISON:  Chest radiograph 04/26/2012    FINDINGS:  Cardiomediastinal silhouette is within normal limits.    Right greater than left ill-defined ground-glass and more focal nodular consolidative airspace opacities throughout the bilateral lung fields.  No overt interstitial edema, sizable pleural effusion or pneumothorax.    Imaged osseous structures are grossly unremarkable.                                 Medical Decision Making:   History:   Old Medical Records: I decided to obtain old medical records.  Old Records Summarized: other records.       <> Summary of Records: Recent endoscopy for colon cancer screening.  Initial Assessment:   Patient is hypoxic, tachypneic and in respiratory distress on arrival.  She has coarse breath sounds bilaterally.  She is significantly hyperglycemic over 500.  Unclear symptoms are related to COVID, sepsis, DKA, CHF Will obtain lab testing and imaging to further evaluate.  Differential Diagnosis:   My initial differential diagnoses include but are not limited to respiratory failure, CHF, COPD, COVID, PNA, ACS, electrolyte abnormality, DKA.   Independently Interpreted Test(s):   I have ordered and independently interpreted EKG Reading(s) - see prior notes  Clinical Tests:   Lab Tests: Ordered and Reviewed  Radiological Study: Ordered and Reviewed  Medical Tests:  Ordered and Reviewed  Sepsis Perfusion Assessment: I attest, a sepsis perfusion exam was performed within 6 hours of Septic Shock presentation, following fluid resuscitation.  ED Management:  Patient is COVID positive.  Her oxygen saturation improved to 100% on non-rebreather mask however she is saturating in the low 90s when transition to nasal cannula.  She has a white count of 14.  Chest x-ray with multifocal pneumonia.  Lactic acid is elevated at 5 .Chemistry with RADHA.  Patient has been mildly hypotensive in the emergency department.  She has been giving 1 L fluid bolus.  Further fluid bolus withheld due to concerns of fluid overload in COVID patient.  Potassium is within normal ranges.  Patient has significant hyperglycemia without anion gap.  She does not have significant acidosis on her VBG.  Patient given insulin in the emergency department.  She has been started on broad-spectrum antibiotics.  Patient does not appear to require emergent intubation however given her significant hypoxia triaged and oxygen requirement she is to be admitted to the ICU for further management of COVID, RADHA, hyperglycemia  This chart was completed using dictation software, as a result there may be some transcription errors.                                  Clinical Impression:       ICD-10-CM ICD-9-CM   1. Pneumonia due to COVID-19 virus U07.1     J12.89    2. SOB (shortness of breath) R06.02 786.05   3. Hyperglycemia R73.9 790.29   4. Hypoxia R09.02 799.02   5. RADHA (acute kidney injury) N17.9 584.9   6. Elevated brain natriuretic peptide (BNP) level R79.89 790.99   7. Acute respiratory failure with hypoxia J96.01 518.81         Disposition:   Disposition: Admitted  Condition: Critical     ED Disposition Condition    Admit                       I, Quinten Dumont , personally performed the services described in this documentation. All medical record entries made by the scribe were at my direction and in my presence.  I have  reviewed the chart and agree that the record reflects my personal performance and is accurate and complete.       Quinten Dumont MD  04/16/20 [id number]

## 2020-04-16 NOTE — ASSESSMENT & PLAN NOTE
Patient is COVID-19 positive. She has an elevated BNP. CXR with bilateral infiltrates. Some wheezing.   -Agree with ABx given leukocytosis and elevated lactate  -Consider steroids given wheezing. Cautious given blood glucose.   -Wean O2 as tolerated.

## 2020-04-16 NOTE — CONSULTS
"[facility]  Pulmonology  Consult Note    Patient Name: Ava Driscoll  MRN: [id number]  Admission Date: 2020  Hospital Length of Stay: 0 days  Code Status: Full Code  Attending Physician: Shanon Hill MD  Primary Care Provider: Keerthi Murphy MD   Principal Problem: Acute respiratory failure with hypoxia    Inpatient consult to Pulmonology  Consult performed by: Remedios Jain MD  Consult ordered by: Shanon Hill MD  Reason for consult: Acute hypoxic respiratory failure        Subjective:     HPI:  49 year old female with diabetes mellitus type 2, obesity, hyperlipidemia, shchizophrenia and hypertension who presented with shortness of breath and cough "for the past couple of days". Feels likes symptoms have worsened overtime. Denied fever, chills, chest pain, abdominal pain, nausea/vomiting or diarrhea. Patient states compliance with her medications, including the ones for diabetes.    Pulmonary consulted for acute hypoxic respiratory failure.    Past Medical History:   Diagnosis Date    Behavioral problem     Diabetes mellitus     History of psychiatric care     History of psychiatric hospitalization     Psychiatric problem     Retinopathy due to secondary diabetes     Schizophrenia        Past Surgical History:   Procedure Laterality Date     SECTION      COLONOSCOPY N/A 2020    Procedure: COLONOSCOPY;  Surgeon: Edvin Zuniga II, MD;  Location: [facility];  Service: Endoscopy;  Laterality: N/A;    HYSTERECTOMY         Review of patient's allergies indicates:   Allergen Reactions    Ace inhibitors      Cough       Family History     Problem Relation (Age of Onset)    Arthritis Mother    Breast cancer Maternal Aunt (80)    Diabetes type II Father        Tobacco Use    Smoking status: Never Smoker    Smokeless tobacco: Never Used   Substance and Sexual Activity    Alcohol use: No    Drug use: No    Sexual activity: Never         Review of Systems "   Constitutional: Positive for activity change. Negative for fatigue and fever.   Respiratory: Positive for cough and shortness of breath.      Objective:     Vital Signs (Most Recent):  Temp: 99.5 °F (37.5 °C) (04/16/20 1600)  Pulse: 91 (04/16/20 1630)  Resp: (!) 30 (04/16/20 1615)  BP: 110/85 (04/16/20 1630)  SpO2: (!) 94 % (04/16/20 1630) Vital Signs (24h Range):  Temp:  [98.5 °F (36.9 °C)-99.5 °F (37.5 °C)] 99.5 °F (37.5 °C)  Pulse:  [] 91  Resp:  [22-41] 30  SpO2:  [72 %-100 %] 94 %  BP: ()/(62-85) 110/85     Weight: 136.1 kg (300 lb)  Body mass index is 51.49 kg/m².      Intake/Output Summary (Last 24 hours) at 4/16/2020 1640  Last data filed at 4/16/2020 1318  Gross per 24 hour   Intake 300 ml   Output --   Net 300 ml       Physical Exam   Constitutional: She is oriented to person, place, and time. She appears well-developed and well-nourished. No distress.   Eyes: Pupils are equal, round, and reactive to light. EOM are normal.   Cardiovascular: Normal rate and regular rhythm.   Pulmonary/Chest:   wheezing   Abdominal: Soft. Bowel sounds are normal.   Neurological: She is alert and oriented to person, place, and time.   Skin: Skin is warm and dry. She is not diaphoretic.   Nursing note and vitals reviewed.      Vents:       Lines/Drains/Airways     Peripherally Inserted Central Catheter Line            PICC Triple Lumen 04/16/20 1605 right basilic less than 1 day          Peripheral Intravenous Line                 Peripheral IV - Single Lumen 04/16/20 1131 20 G Left Antecubital less than 1 day         Peripheral IV - Single Lumen 04/16/20 1200 18 G;1 3/4 in Left Upper Arm less than 1 day                Significant Labs:    CBC/Anemia Profile:  Recent Labs   Lab 04/16/20  1131   WBC 14.44*   HGB 11.7*   HCT 36.9*      *   RDW 16.7*   FERRITIN 214        Chemistries:  Recent Labs   Lab 04/16/20  1131   *   K 4.9   CL 92*   CO2 21*   BUN 63*   CREATININE 2.3*   CALCIUM 8.0*    ALBUMIN 2.2*   PROT 8.0   BILITOT 0.8   ALKPHOS 136*   ALT 35   AST 35       All pertinent labs within the past 24 hours have been reviewed.    Significant Imaging:   I have reviewed and interpreted all pertinent imaging results/findings within the past 24 hours.    Assessment/Plan:     * Acute respiratory failure with hypoxia  Patient is COVID-19 positive. She has an elevated BNP. CXR with bilateral infiltrates. Some wheezing.   -Agree with ABx given leukocytosis and elevated lactate  -Consider steroids given wheezing. Cautious given blood glucose.   -Wean O2 as tolerated.     COVID-19 virus infection  Positive      Critical Care time: 35 minutes    Critical Care time for the evaluation and treatment for severe organ dysfunction, review of pertinent labs and imaging studies discussions with consulting services and discussions with patient.      Continue ICU care.         Thank you for your consult. I will follow-up with patient. Please contact us if you have any additional questions.     Remedios Jain MD  Pulmonology  [facility]

## 2020-04-16 NOTE — PROGRESS NOTES
ODELL f/u with patient's sister, Nanci, with mother on 3 way call to complete discharge planning assessment.  Patient from home alone. Has a son who lives in the home sometime.  Has parents and sister who visit regularly.  Patient does not have a POA but mother will be the decision maker if needed.    Hailey Gallego LMSW, [facility]  4/16/20

## 2020-04-16 NOTE — PLAN OF CARE
Patient from home alone with family members (mother and sister) visiting regularly.  Patient has a son who lives in the home from time to time.  Patient was independent prior to admit and had no DME for sister.  Patient uses paid transportation to get to and from app.  Patient's preferred pharmacy is [facility] on [facility].  Patient will have at least two people to help at home if necessary.  No one has POA.  Patient's mother would assist in decision making if needed.        04/16/20 1544   Discharge Assessment   Assessment Type Discharge Planning Assessment   Confirmed/corrected address and phone number on facesheet? Yes  ([address], [address], [place])   Assessment information obtained from? Other;Medical Record  (Mother and sister (Nanci).)   Expected Length of Stay (days) 2   Communicated expected length of stay with patient/caregiver no   Prior to hospitilization cognitive status: Alert/Oriented   Prior to hospitalization functional status: Independent   Current cognitive status: Alert/Oriented  (Somewhat confused when presenting to ED today.)   Current Functional Status: Assistive Equipment;Needs Assistance   Facility Arrived From: home   Lives With alone   Able to Return to Prior Arrangements yes   Is patient able to care for self after discharge? Yes   Who are your caregiver(s) and their phone number(s)? Nanci Guillen; sister; [phone] or Mother; Clarisa Cardoza  [phone]   Patient's perception of discharge disposition home health   Readmission Within the Last 30 Days no previous admission in last 30 days   Patient currently being followed by outpatient case management? No   Patient currently receives any other outside agency services? No   Equipment Currently Used at Home none   Part D Coverage ([employer] [place])   Do you have any problems affording any of your prescribed medications? No   Is the patient taking medications as prescribed? yes   Does the patient have transportation  home? Yes   Transportation Anticipated family or friend will provide   Dialysis Name and Scheduled days n/a   Does the patient receive services at the [facility]? No   Discharge Plan A Home Health   Discharge Plan B Home   DME Needed Upon Discharge  glucometer;oxygen  (TBD)   Patient/Family in Agreement with Plan other (see comments)  (Completed with assistance from patient's mother and sister.)        04/16/20 1544   Discharge Assessment   Assessment Type Discharge Planning Assessment   Confirmed/corrected address and phone number on facesheet? Yes  ([address], [address], [place])   Assessment information obtained from? Other;Medical Record  (Mother and sister (Nanci).)   Expected Length of Stay (days) 2   Communicated expected length of stay with patient/caregiver no   Prior to hospitilization cognitive status: Alert/Oriented   Prior to hospitalization functional status: Independent   Current cognitive status: Alert/Oriented  (Somewhat confused when presenting to ED today.)   Current Functional Status: Assistive Equipment;Needs Assistance   Facility Arrived From: home   Lives With alone   Able to Return to Prior Arrangements yes   Is patient able to care for self after discharge? Yes   Who are your caregiver(s) and their phone number(s)? Nanci Guillen; sister; [phone] or Mother; Clarisa Cardoza  [phone]   Patient's perception of discharge disposition home health   Readmission Within the Last 30 Days no previous admission in last 30 days   Patient currently being followed by outpatient case management? No   Patient currently receives any other outside agency services? No   Equipment Currently Used at Home none   Part D Coverage ([employer] [place]   Do you have any problems affording any of your prescribed medications? No   Is the patient taking medications as prescribed? yes   Does the patient have transportation home? Yes   Transportation Anticipated family or friend will provide    Dialysis Name and Scheduled days n/a   Does the patient receive services at the [facility]? No   Discharge Plan A Home Health   Discharge Plan B Home   DME Needed Upon Discharge  glucometer;oxygen  (TBD)   Patient/Family in Agreement with Plan other (see comments)  (Completed with assistance from patient's mother and sister.)   Hailey Gallego LMSw, CCM  4/16/20

## 2020-04-16 NOTE — ASSESSMENT & PLAN NOTE
- COVID-19 testing   - Infection Control notified     - Isolation:   - Airborne, Contact and Droplet Precautions  - Cohort patients into COVID units  - N95 masks must be fit tested, wear eye protection  - 20 second hand hygiene              - Limit visitors per hospital policy              - Consolidating lab draws, nursing care, provider visits, and interventions    - Diagnostics: (leukopenia, hyponatremia, hyperferritinemia, elevated troponin, elevated d-dimer, age, and comorbidities are significant predictors of poor clinical outcome)    - Management:  Supplemental O2 to maintain SpO2 >92%  Continuous/intermittent Pulse Ox  Albuterol treatment PRN    Advance Care Planning   Full

## 2020-04-16 NOTE — HPI
"49 year old female with diabetes mellitus type 2, obesity, hyperlipidemia, shchizophrenia and hypertension who presented with shortness of breath and cough "for the past couple of days". Feels likes symptoms have worsened overtime. Denied fever, chills, chest pain, abdominal pain, nausea/vomiting or diarrhea. Patient states compliance with her medications, including the ones for diabetes.    Pulmonary consulted for acute hypoxic respiratory failure.  "

## 2020-04-16 NOTE — HPI
"49 year old female with diabetes mellitus type 2, obesity, hyperlipidemia, shchizophrenia and hypertension who presented with shortness of breath and cough "for the past couple of days". Feels likes symptoms have worsened overtime. Denied fever, chills, chest pain, abdominal pain, nausea/vomiting or diarrhea. Patient states compliance with her medications, including the ones for diabetes. Is not on insulin.    In the ED, patient was tachypneic and hypoxic to the 70s at room air. Afebrile. Labwork significant for   "

## 2020-04-16 NOTE — PROCEDURES
Ava Driscoll is a 49 y.o. female patient.    Temp: 99.5 °F (37.5 °C) (04/16/20 1600)  Pulse: 89 (04/16/20 1600)  Resp: (!) 41 (04/16/20 1600)  BP: 103/77 (04/16/20 1600)  SpO2: (!) 94 % (04/16/20 1600)  Weight: 136.1 kg (300 lb) (04/16/20 1416)    PICC  Date/Time: 4/16/2020 4:05 PM  Performed by: Jim Trotter RN  Consent Done: Yes  Time out: Immediately prior to procedure a time out was called to verify the correct patient, procedure, equipment, support staff and site/side marked as required  Indications: med administration and vascular access  Anesthesia: local infiltration  Local anesthetic: lidocaine 1% without epinephrine  Anesthetic Total (mL): 2  Preparation: skin prepped with chlorhexidine (without alcohol)  Skin prep agent dried: skin prep agent completely dried prior to procedure  Sterile barriers: all five maximum sterile barriers used - cap, mask, sterile gown, sterile gloves, and large sterile sheet  Hand hygiene: hand hygiene performed prior to central venous catheter insertion  Location details: right basilic  Catheter type: triple lumen  Catheter size: 5 Fr  Catheter Length: 37cm    Ultrasound guidance: yes  Vessel Caliber: medium, compressibility normal  Vascular Doppler: not done  Needle advanced into vessel with real time Ultrasound guidance.  Guidewire confirmed in vessel.  Sterile sheath used.  no esophageal manometryNumber of attempts: 1  Post-procedure: blood return through all ports and sterile dressing applied            Jim Trotter  4/16/2020

## 2020-04-16 NOTE — ASSESSMENT & PLAN NOTE
2/2 ARDS associated to COVID-19  Adequate O2 saturation on 4-5 L low flow NC but unable to speak mid to full sentences and using accessory muscles with minimal activity. Received isotonic fluids for dehydration and hyperglycemia. This may worsen respiratory status but patient did need this.   Agree with ICU admission for close observation as she is at high risk for decompensation.  Agree with empiric antibiotics given elevated procalcitonin which could be indicative of superimposed bacterial infection  Treat hyperglycemia  BNP elevated to 322. Will obtain echocardiogram.  Pulmonology consult placed

## 2020-04-17 LAB
ALBUMIN SERPL BCP-MCNC: 1.9 G/DL (ref 3.5–5.2)
ALP SERPL-CCNC: 113 U/L (ref 55–135)
ALT SERPL W/O P-5'-P-CCNC: 31 U/L (ref 10–44)
ANION GAP SERPL CALC-SCNC: 9 MMOL/L (ref 8–16)
ANISOCYTOSIS BLD QL SMEAR: SLIGHT
AORTIC ROOT ANNULUS: 3.37 CM
AORTIC VALVE CUSP SEPERATION: 2.19 CM
ASCENDING AORTA: 3.51 CM
AST SERPL-CCNC: 25 U/L (ref 10–40)
AV INDEX (PROSTH): 0.94
AV MEAN GRADIENT: 5 MMHG
AV PEAK GRADIENT: 9 MMHG
AV VALVE AREA: 3.18 CM2
AV VELOCITY RATIO: 0.9
BASOPHILS # BLD AUTO: ABNORMAL K/UL (ref 0–0.2)
BASOPHILS NFR BLD: 0 % (ref 0–1.9)
BILIRUB SERPL-MCNC: 0.5 MG/DL (ref 0.1–1)
BSA FOR ECHO PROCEDURE: 2.48 M2
BUN SERPL-MCNC: 46 MG/DL (ref 6–20)
CALCIUM SERPL-MCNC: 7.5 MG/DL (ref 8.7–10.5)
CHLORIDE SERPL-SCNC: 96 MMOL/L (ref 95–110)
CO2 SERPL-SCNC: 24 MMOL/L (ref 23–29)
CREAT SERPL-MCNC: 1.5 MG/DL (ref 0.5–1.4)
CV ECHO LV RWT: 0.57 CM
DIFFERENTIAL METHOD: ABNORMAL
DOP CALC AO PEAK VEL: 1.46 M/S
DOP CALC AO VTI: 24.39 CM
DOP CALC LVOT AREA: 3.4 CM2
DOP CALC LVOT DIAMETER: 2.08 CM
DOP CALC LVOT PEAK VEL: 1.32 M/S
DOP CALC LVOT STROKE VOLUME: 77.5 CM3
DOP CALCLVOT PEAK VEL VTI: 22.82 CM
E WAVE DECELERATION TIME: 211.7 MSEC
E/A RATIO: 0.78
E/E' RATIO: 7.87 M/S
ECHO LV POSTERIOR WALL: 1.13 CM (ref 0.6–1.1)
EOSINOPHIL # BLD AUTO: ABNORMAL K/UL (ref 0–0.5)
EOSINOPHIL NFR BLD: 0 % (ref 0–8)
ERYTHROCYTE [DISTWIDTH] IN BLOOD BY AUTOMATED COUNT: 16.3 % (ref 11.5–14.5)
EST. GFR  (AFRICAN AMERICAN): 47 ML/MIN/1.73 M^2
EST. GFR  (NON AFRICAN AMERICAN): 41 ML/MIN/1.73 M^2
ESTIMATED AVG GLUCOSE: 309 MG/DL (ref 68–131)
FRACTIONAL SHORTENING: 28 % (ref 28–44)
GIANT PLATELETS BLD QL SMEAR: PRESENT
GLUCOSE SERPL-MCNC: 461 MG/DL (ref 70–110)
HBA1C MFR BLD HPLC: 12.4 % (ref 4–5.6)
HCT VFR BLD AUTO: 32.8 % (ref 37–48.5)
HGB BLD-MCNC: 10.3 G/DL (ref 12–16)
HYPOCHROMIA BLD QL SMEAR: ABNORMAL
IMM GRANULOCYTES # BLD AUTO: ABNORMAL K/UL (ref 0–0.04)
IMM GRANULOCYTES NFR BLD AUTO: ABNORMAL % (ref 0–0.5)
INTERVENTRICULAR SEPTUM: 1.19 CM (ref 0.6–1.1)
IVRT: 101.5 MSEC
LA MAJOR: 5.11 CM
LA MINOR: 4.57 CM
LA WIDTH: 3.03 CM
LEFT ATRIUM SIZE: 2.92 CM
LEFT ATRIUM VOLUME INDEX: 15.6 ML/M2
LEFT ATRIUM VOLUME: 36.29 CM3
LEFT INTERNAL DIMENSION IN SYSTOLE: 2.85 CM (ref 2.1–4)
LEFT VENTRICLE DIASTOLIC VOLUME INDEX: 29.6 ML/M2
LEFT VENTRICLE DIASTOLIC VOLUME: 68.78 ML
LEFT VENTRICLE MASS INDEX: 67 G/M2
LEFT VENTRICLE SYSTOLIC VOLUME INDEX: 13.3 ML/M2
LEFT VENTRICLE SYSTOLIC VOLUME: 30.93 ML
LEFT VENTRICULAR INTERNAL DIMENSION IN DIASTOLE: 3.97 CM (ref 3.5–6)
LEFT VENTRICULAR MASS: 155.59 G
LV LATERAL E/E' RATIO: 7.38 M/S
LV SEPTAL E/E' RATIO: 8.43 M/S
LYMPHOCYTES # BLD AUTO: ABNORMAL K/UL (ref 1–4.8)
LYMPHOCYTES NFR BLD: 13 % (ref 18–48)
MCH RBC QN AUTO: 30.7 PG (ref 27–31)
MCHC RBC AUTO-ENTMCNC: 31.4 G/DL (ref 32–36)
MCV RBC AUTO: 98 FL (ref 82–98)
METAMYELOCYTES NFR BLD MANUAL: 1 %
MONOCYTES # BLD AUTO: ABNORMAL K/UL (ref 0.3–1)
MONOCYTES NFR BLD: 5 % (ref 4–15)
MV PEAK A VEL: 0.76 M/S
MV PEAK E VEL: 0.59 M/S
MYELOCYTES NFR BLD MANUAL: 2 %
NEUTROPHILS # BLD AUTO: ABNORMAL K/UL (ref 1.8–7.7)
NEUTROPHILS NFR BLD: 74 % (ref 38–73)
NEUTS BAND NFR BLD MANUAL: 5 %
NRBC BLD-RTO: 2 /100 WBC
OSMOLALITY SERPL: 315 MOSM/KG (ref 275–295)
PISA TR MAX VEL: 2.53 M/S
PLATELET # BLD AUTO: 198 K/UL (ref 150–350)
PLATELET BLD QL SMEAR: ABNORMAL
PMV BLD AUTO: 11 FL (ref 9.2–12.9)
POCT GLUCOSE: 349 MG/DL (ref 70–110)
POCT GLUCOSE: 381 MG/DL (ref 70–110)
POCT GLUCOSE: 384 MG/DL (ref 70–110)
POCT GLUCOSE: 454 MG/DL (ref 70–110)
POLYCHROMASIA BLD QL SMEAR: ABNORMAL
POTASSIUM SERPL-SCNC: 5.2 MMOL/L (ref 3.5–5.1)
PROT SERPL-MCNC: 6.8 G/DL (ref 6–8.4)
PULM VEIN S/D RATIO: 0.86
PV PEAK D VEL: 0.57 M/S
PV PEAK S VEL: 0.49 M/S
PV PEAK VELOCITY: 1.06 CM/S
RA MAJOR: 4.84 CM
RA PRESSURE: 8 MMHG
RA WIDTH: 4.17 CM
RBC # BLD AUTO: 3.36 M/UL (ref 4–5.4)
RIGHT VENTRICULAR END-DIASTOLIC DIMENSION: 3.93 CM
RV TISSUE DOPPLER FREE WALL SYSTOLIC VELOCITY 1 (APICAL 4 CHAMBER VIEW): 11.06 CM/S
SINUS: 3.33 CM
SODIUM SERPL-SCNC: 129 MMOL/L (ref 136–145)
STJ: 3.06 CM
TDI LATERAL: 0.08 M/S
TDI SEPTAL: 0.07 M/S
TDI: 0.08 M/S
TR MAX PG: 26 MMHG
TRICUSPID ANNULAR PLANE SYSTOLIC EXCURSION: 1.72 CM
TV REST PULMONARY ARTERY PRESSURE: 34 MMHG
WBC # BLD AUTO: 10.41 K/UL (ref 3.9–12.7)

## 2020-04-17 PROCEDURE — 83036 HEMOGLOBIN GLYCOSYLATED A1C: CPT

## 2020-04-17 PROCEDURE — 63600175 PHARM REV CODE 636 W HCPCS: Performed by: INTERNAL MEDICINE

## 2020-04-17 PROCEDURE — 11000001 HC ACUTE MED/SURG PRIVATE ROOM

## 2020-04-17 PROCEDURE — C9399 UNCLASSIFIED DRUGS OR BIOLOG: HCPCS | Performed by: INTERNAL MEDICINE

## 2020-04-17 PROCEDURE — 25000003 PHARM REV CODE 250: Performed by: INTERNAL MEDICINE

## 2020-04-17 PROCEDURE — 85027 COMPLETE CBC AUTOMATED: CPT

## 2020-04-17 PROCEDURE — 85007 BL SMEAR W/DIFF WBC COUNT: CPT

## 2020-04-17 PROCEDURE — 36415 COLL VENOUS BLD VENIPUNCTURE: CPT

## 2020-04-17 PROCEDURE — 80053 COMPREHEN METABOLIC PANEL: CPT

## 2020-04-17 PROCEDURE — 99233 SBSQ HOSP IP/OBS HIGH 50: CPT | Mod: ,,, | Performed by: INTERNAL MEDICINE

## 2020-04-17 PROCEDURE — 99233 PR SUBSEQUENT HOSPITAL CARE,LEVL III: ICD-10-PCS | Mod: ,,, | Performed by: INTERNAL MEDICINE

## 2020-04-17 PROCEDURE — 94761 N-INVAS EAR/PLS OXIMETRY MLT: CPT

## 2020-04-17 PROCEDURE — A4216 STERILE WATER/SALINE, 10 ML: HCPCS | Performed by: INTERNAL MEDICINE

## 2020-04-17 PROCEDURE — 63700000 PHARM REV CODE 250 ALT 637 W/O HCPCS: Performed by: INTERNAL MEDICINE

## 2020-04-17 PROCEDURE — 25000003 PHARM REV CODE 250: Performed by: EMERGENCY MEDICINE

## 2020-04-17 PROCEDURE — S0028 INJECTION, FAMOTIDINE, 20 MG: HCPCS | Performed by: INTERNAL MEDICINE

## 2020-04-17 RX ORDER — ENOXAPARIN SODIUM 100 MG/ML
40 INJECTION SUBCUTANEOUS EVERY 24 HOURS
Status: DISCONTINUED | OUTPATIENT
Start: 2020-04-17 | End: 2020-04-21 | Stop reason: HOSPADM

## 2020-04-17 RX ORDER — INSULIN ASPART 100 [IU]/ML
8 INJECTION, SOLUTION INTRAVENOUS; SUBCUTANEOUS
Status: DISCONTINUED | OUTPATIENT
Start: 2020-04-17 | End: 2020-04-21 | Stop reason: HOSPADM

## 2020-04-17 RX ADMIN — INSULIN DETEMIR 30 UNITS: 100 INJECTION, SOLUTION SUBCUTANEOUS at 08:04

## 2020-04-17 RX ADMIN — METHYLPREDNISOLONE SODIUM SUCCINATE 40 MG: 40 INJECTION, POWDER, FOR SOLUTION INTRAMUSCULAR; INTRAVENOUS at 08:04

## 2020-04-17 RX ADMIN — FAMOTIDINE 20 MG: 10 INJECTION INTRAVENOUS at 08:04

## 2020-04-17 RX ADMIN — INSULIN ASPART 10 UNITS: 100 INJECTION, SOLUTION INTRAVENOUS; SUBCUTANEOUS at 05:04

## 2020-04-17 RX ADMIN — Medication 10 ML: at 12:04

## 2020-04-17 RX ADMIN — ENOXAPARIN SODIUM 40 MG: 100 INJECTION SUBCUTANEOUS at 08:04

## 2020-04-17 RX ADMIN — INSULIN ASPART 5 UNITS: 100 INJECTION, SOLUTION INTRAVENOUS; SUBCUTANEOUS at 08:04

## 2020-04-17 RX ADMIN — Medication 10 ML: at 05:04

## 2020-04-17 RX ADMIN — AZITHROMYCIN 250 MG: 250 TABLET, FILM COATED ORAL at 08:04

## 2020-04-17 RX ADMIN — CEFTRIAXONE 1 G: 1 INJECTION, SOLUTION INTRAVENOUS at 12:04

## 2020-04-17 RX ADMIN — INSULIN ASPART 10 UNITS: 100 INJECTION, SOLUTION INTRAVENOUS; SUBCUTANEOUS at 12:04

## 2020-04-17 RX ADMIN — ACETAMINOPHEN 650 MG: 325 TABLET ORAL at 01:04

## 2020-04-17 RX ADMIN — INSULIN ASPART 8 UNITS: 100 INJECTION, SOLUTION INTRAVENOUS; SUBCUTANEOUS at 05:04

## 2020-04-17 RX ADMIN — ACETAMINOPHEN 650 MG: 325 TABLET ORAL at 08:04

## 2020-04-17 RX ADMIN — INSULIN ASPART 8 UNITS: 100 INJECTION, SOLUTION INTRAVENOUS; SUBCUTANEOUS at 08:04

## 2020-04-17 NOTE — PLAN OF CARE
Patient awake and alert  oriented times 3 reoriented to time. Patient admitted to room 262  with pneumonia and shortness of breath and covid positive. Patient also admitted with elevated glucose and treatment started. Vital signs stable. Patient received right upper arm Picc and chest xray completed and ok to use line.  Patient has mid 90s  sats on 5 liters nasal cannila. No falls no injuries and no skin breakdown noted. Plan of care reviewed at bedside.

## 2020-04-17 NOTE — ASSESSMENT & PLAN NOTE
With hyperglycemia  No HNKH or DKA  Increase SQ insulin regimen for goal -180  Will need insulin at discharge since HgbA1c is 12.4%  Needs insulin teaching  Hold oral hypoglycemic agents

## 2020-04-17 NOTE — HOSPITAL COURSE
Ms Driscoll presented with acute respiratory failure with hypoxia 2/2 COVID-19 infection. Placed on NRB in the ED but quickly de-escalated to low flow NC at 4-5 L. Oxygen saturation increased appropriately but patient easily tachypneic with minimal activity or even by speaking one word at a time. Patient also dehydration and hyperglycemic but not in DKA or in HNKH. Admitted to ICU for close observation given high risk for further decompensation. Given isotonic fluids and initiated on insulin injections. Patient felt better the next day and breathing more comfortably on low flow NC but glucose still very difficult to control. SQ dose increased. HgbA1c very high 12.4% on oral hypoglycemic agents. Would benefit from insulin injections at home. Stepped down on 4/17. Chu catheter removed and patient using the bedside commode. PT/OT eval ordered.  4/21/2020:   Ms. Driscoll was transitioned to the floor 4 days ago. Currently sating 97 % on supportive 2 liters NC of support, which is progressive improvement in the setting of admission presentation and diagnosis of Acute Respiratory Failure 2/2 novel COVID 19 PCR infectious process. Completed Azithromycin and Zosyn therapy for secondary COVID related pneumonia. On day of discharge, afebrile (98.1), but still requiring 2 liters of support. Will be discharged home with 2 liters of support and PCP (Dr. Murphy) for follow up.  She is chronically uncontrolled with DM II (admit A1c: 12%); subsequently, commenced to IV insulin therapy (Basal 30 daily and Aspart 8/8/8) on admission, with plans for discharge on her routine oral home regimen of Metformin and Amaryl, ADA diet. Recommend OP follow up with Endocrine. Will defer to her PCP. 24 hour POCT Blood Sugar Range prior to discharge: 135-218 mg/dl.   H/o Paranoid Schizophrenia. Not challenged this admission. Seen on consult per Inpatient Psychiatrist due to in-house pharmacy not having her routine home medication, Invega, on formulary.  Appreciate the input. She is established with [facility] and will follow up post discharge.   She was seen and evaluated by inpatient PT and OT without any assessed needs at time of discharge.

## 2020-04-17 NOTE — ASSESSMENT & PLAN NOTE
Patient is COVID-19 positive. She has an elevated BNP. CXR with bilateral infiltrates. Some wheezing.   -Agree with ABx given leukocytosis and elevated lactate  -Would hold steroids now.   -Wean O2 as tolerated.

## 2020-04-17 NOTE — PROGRESS NOTES
"[facility] Medicine  Progress Note    Patient Name: Ava Driscoll  MRN: [id number]  Patient Class: IP- Inpatient   Admission Date: 4/16/2020  Length of Stay: 1 days  Attending Physician: Shanon Hill MD  Primary Care Provider: Keerthi Murphy MD        Subjective:     Principal Problem:Acute respiratory failure with hypoxia        HPI:  49 year old female with diabetes mellitus type 2, obesity, hyperlipidemia, shchizophrenia and hypertension who presented with shortness of breath and cough "for the past couple of days". Feels likes symptoms have worsened overtime. Denied fever, chills, chest pain, abdominal pain, nausea/vomiting or diarrhea. Patient states compliance with her medications, including the ones for diabetes. Is not on insulin.    In the ED, patient was tachypneic and hypoxic to the 70s at room air. Afebrile. Labwork significant for     Overview/Hospital Course:  Ms Driscoll presented with acute respiratory failure with hypoxia 2/2 COVID-19 infection. Placed on NRB in the ED but quickly de-escalated to low flow NC at 4-5 L. Oxygen saturation increased appropriately but patient easily tachypneic with minimal activity or even by speaking one word at a time. Patient also dehydration and hyperglycemic but not in DKA or in HNKH. Admitted to ICU for close observation given high risk for further decompensation. Given isotonic fluids and initiated on insulin injections. Patient felt better the next day and breathing more comfortably on low flow NC but glucose still very difficult to control. SQ dose increased. HgbA1c very high 12.4% on oral hypoglycemic agents. Would benefit from insulin injections at home. Stepped down on 4/17    Past Medical History:   Diagnosis Date    Behavioral problem     Diabetes mellitus     History of psychiatric care     History of psychiatric hospitalization     Psychiatric problem     Retinopathy due to secondary diabetes     Schizophrenia        Past " Surgical History:   Procedure Laterality Date     SECTION      COLONOSCOPY N/A 2020    Procedure: COLONOSCOPY;  Surgeon: Edvin Zuniga II, MD;  Location: [facility];  Service: Endoscopy;  Laterality: N/A;    HYSTERECTOMY         Review of patient's allergies indicates:   Allergen Reactions    Ace inhibitors      Cough       No current facility-administered medications on file prior to encounter.      Current Outpatient Medications on File Prior to Encounter   Medication Sig    ergocalciferol (ERGOCALCIFEROL) 50,000 unit Cap Vitamin D2 1,250 mcg (50,000 unit) capsule   Take 1 capsule every week by oral route.    glimepiride (AMARYL) 4 MG tablet Take 1 tablet (4 mg total) by mouth before breakfast.    levocetirizine (XYZAL) 5 MG tablet Take 1 tablet (5 mg total) by mouth every evening.    losartan (COZAAR) 25 MG tablet Take 1 tablet (25 mg total) by mouth once daily.    metFORMIN (GLUCOPHAGE) 1000 MG tablet TAKE ONE TABLET BY MOUTH TWICE DAILY WITH MEALS    pravastatin (PRAVACHOL) 40 MG tablet Take 1 tablet (40 mg total) by mouth once daily.    blood sugar diagnostic Strp Check BS once a day.    blood-glucose meter kit Check BS once a day.    ibuprofen (ADVIL,MOTRIN) 800 MG tablet     INVEGA SUSTENNA 156 mg/mL Syrg injection     lancets (LANCETS,ULTRA THIN) Misc Check BS once a day.    methylPREDNISolone (MEDROL DOSEPACK) 4 mg tablet use as directed     Family History     Problem Relation (Age of Onset)    Arthritis Mother    Breast cancer Maternal Aunt (80)    Diabetes type II Father        Tobacco Use    Smoking status: Never Smoker    Smokeless tobacco: Never Used   Substance and Sexual Activity    Alcohol use: No    Drug use: No    Sexual activity: Never     Review of Systems   Constitutional: Negative.    Respiratory: Positive for shortness of breath (better). Negative for cough.    Cardiovascular: Negative.    Gastrointestinal: Negative.      Objective:     Vital Signs  (Most Recent):  Temp: 99.5 °F (37.5 °C) (04/16/20 1600)  Pulse: 91 (04/16/20 1630)  Resp: (!) 30 (04/16/20 1615)  BP: 110/85 (04/16/20 1630)  SpO2: (!) 94 % (04/16/20 1630) Vital Signs (24h Range):  Temp:  [98.5 °F (36.9 °C)-99.5 °F (37.5 °C)] 99.5 °F (37.5 °C)  Pulse:  [] 91  Resp:  [22-41] 30  SpO2:  [72 %-100 %] 94 %  BP: ()/(62-85) 110/85     Weight: 136.1 kg (300 lb)  Body mass index is 51.49 kg/m².    Physical Exam   Constitutional: She is oriented to person, place, and time. She appears well-developed. No distress.   HENT:   Head: Normocephalic and atraumatic.   Eyes: Conjunctivae and EOM are normal.   Neck: Normal range of motion.   Cardiovascular: Normal rate and regular rhythm.   Pulmonary/Chest:   Speaking mid to full sentences without distress  On low flow NC   Abdominal: Soft.   Musculoskeletal: She exhibits no edema.   Neurological: She is alert and oriented to person, place, and time.   Skin: Skin is warm. Capillary refill takes less than 2 seconds. She is not diaphoretic.   Psychiatric: She has a normal mood and affect. Her behavior is normal. Judgment and thought content normal.   Nursing note and vitals reviewed.        CRANIAL NERVES     CN III, IV, VI   Extraocular motions are normal.        Significant Labs: All pertinent labs within the past 24 hours have been reviewed.    Significant Imaging: I have reviewed all pertinent imaging results/findings within the past 24 hours.  I have reviewed and interpreted all pertinent imaging results/findings within the past 24 hours.      Assessment/Plan:      * Acute respiratory failure with hypoxia  2/2 ARDS associated to COVID-19  Clinically improved and has remained stable on low flow NC although still dyspneic with minimal activity  Mobilize and remove garduno once able to tolerate some activity  Fluids stopped. Echo ok.   Continue empiric antibiotics given elevated procalcitonin which could be indicative of superimposed bacterial  infection  Treat hyperglycemia  Ok to step down to floor today        COVID-19 virus infection  - COVID-19 testing   - Infection Control notified     - Isolation:   - Airborne, Contact and Droplet Precautions  - Cohort patients into COVID units  - N95 masks must be fit tested, wear eye protection  - 20 second hand hygiene              - Limit visitors per hospital policy              - Consolidating lab draws, nursing care, provider visits, and interventions    - Diagnostics: (leukopenia, hyponatremia, hyperferritinemia, elevated troponin, elevated d-dimer, age, and comorbidities are significant predictors of poor clinical outcome)    - Management:  Supplemental O2 to maintain SpO2 >92%  Continuous/intermittent Pulse Ox  Albuterol treatment PRN    Advance Care Planning   Full             Pneumonia due to COVID-19 virus  As above      Lactic acidosis  2/2 dehydration  Resolved with fluids      Hyponatremia with extracellular fluid depletion  Likely due to dehydration  S/p fluids  Will repeat CMP       Type 2 diabetes mellitus with hyperglycemia, without long-term current use of insulin  With hyperglycemia  No HNKH or DKA  Increase SQ insulin regimen for goal -180  Will need insulin at discharge since HgbA1c is 12.4%  Needs insulin teaching  Hold oral hypoglycemic agents    BMI 50.0-59.9, adult  Will discuss weight loss when appropriate      Paranoid schizophrenia  No acute issues  Correction: patient is on monthly Invega injections due today  We do not have this medication in our facility nor any other equivalent agent, per my discussion with Psychiatrist  Will start oral regimen for now       VTE Risk Mitigation (From admission, onward)         Ordered     enoxaparin injection 40 mg  Daily      04/17/20 1725     IP VTE HIGH RISK PATIENT  Once      04/16/20 1519     Place sequential compression device  Until discontinued      04/16/20 1519              Time spent: > 35 minutes    Patient states she will call  her mother for an update    Ok to step down to floor today      Shanon Shelby MD  Department of Hospital Medicine   [facility]

## 2020-04-17 NOTE — NURSING
Pt arrived on unit in bed with pt transport,pt aaox4,denies pain,able to make needs known,pt oriented to room and call light,i agree with previous assessment,care resumed by this nurse,safety maintained continue monitoring.

## 2020-04-17 NOTE — NURSING
Patient with stable vitals sign and afebrile today. Patient has shortness of breath with eating and any exertion at all. Dr. Shelby and Dr. Jain aware of patients condition. Patient on 5 liters nasal cannula and 02 sats are in mid 90s. Patient also has elevated blood sugars and Heron Dunham aware and new orders received. After reviewing chart transfer order were written for med/surg unit. Report called to NORBERT Emery

## 2020-04-17 NOTE — PROGRESS NOTES
[facility]  Pulmonology  Progress Note    Patient Name: Ava Driscoll  MRN: [id number]  Admission Date: 4/16/2020  Hospital Length of Stay: 1 days  Code Status: Full Code  Attending Provider: Shanon Hill MD  Primary Care Provider: Keerthi Murphy MD   Principal Problem: Acute respiratory failure with hypoxia    Subjective:     No acute issues. On 4-5L NC.     Review of Systems   Constitutional: Positive for activity change. Negative for fatigue and fever.   Respiratory: Positive for cough and shortness of breath.      Objective:     Vital Signs (Most Recent):  Temp: 98.5 °F (36.9 °C) (04/17/20 0815)  Pulse: 90 (04/17/20 0900)  Resp: (!) 35 (04/17/20 0900)  BP: 112/81 (04/17/20 0900)  SpO2: (!) 89 % (04/17/20 0900) Vital Signs (24h Range):  Temp:  [97.8 °F (36.6 °C)-99.5 °F (37.5 °C)] 98.5 °F (36.9 °C)  Pulse:  [75-97] 90  Resp:  [16-48] 35  SpO2:  [81 %-99 %] 89 %  BP: ()/(62-92) 112/81     Weight: 136.1 kg (300 lb)  Body mass index is 51.49 kg/m².      Intake/Output Summary (Last 24 hours) at 4/17/2020 1325  Last data filed at 4/17/2020 0815  Gross per 24 hour   Intake 2040 ml   Output 2650 ml   Net -610 ml       Physical Exam   Constitutional: She is oriented to person, place, and time. She appears well-developed and well-nourished. No distress.   Eyes: Pupils are equal, round, and reactive to light. EOM are normal.   Cardiovascular: Normal rate and regular rhythm.   Pulmonary/Chest:   wheezing   Abdominal: Soft. Bowel sounds are normal.   Neurological: She is alert and oriented to person, place, and time.   Skin: Skin is warm and dry. She is not diaphoretic.   Nursing note and vitals reviewed.      Vents:       Lines/Drains/Airways     Peripherally Inserted Central Catheter Line            PICC Triple Lumen 04/16/20 1605 right basilic less than 1 day          Drain                 Urethral Catheter 04/16/20 6285 Latex less than 1 day          Peripheral Intravenous Line                  Peripheral IV - Single Lumen 04/16/20 1131 20 G Left Antecubital 1 day         Peripheral IV - Single Lumen 04/16/20 1200 18 G;1 3/4 in Left Upper Arm 1 day                Significant Labs:    CBC/Anemia Profile:  Recent Labs   Lab 04/16/20  1131 04/17/20  0435   WBC 14.44* 10.41   HGB 11.7* 10.3*   HCT 36.9* 32.8*    198   * 98   RDW 16.7* 16.3*   FERRITIN 214  --         Chemistries:  Recent Labs   Lab 04/16/20  1131 04/16/20  1857 04/17/20  0435   * 132* 129*   K 4.9 5.0 5.2*   CL 92* 97 96   CO2 21* 24 24   BUN 63* 55* 46*   CREATININE 2.3* 1.7* 1.5*   CALCIUM 8.0* 7.7* 7.5*   ALBUMIN 2.2* 2.0* 1.9*   PROT 8.0 7.0 6.8   BILITOT 0.8 0.6 0.5   ALKPHOS 136* 118 113   ALT 35 31 31   AST 35 29 25       All pertinent labs within the past 24 hours have been reviewed.    Significant Imaging:   I have reviewed and interpreted all pertinent imaging results/findings within the past 24 hours.    Assessment/Plan:     * Acute respiratory failure with hypoxia  Patient is COVID-19 positive. She has an elevated BNP. CXR with bilateral infiltrates. Some wheezing.   -Agree with ABx given leukocytosis and elevated lactate  -Would hold steroids now.   -Wean O2 as tolerated.        Will follow from afar. Please call with questions.      Remedios Jain MD  Pulmonology  [facility]

## 2020-04-17 NOTE — PLAN OF CARE
Pt continues to be on O2 at 5 lpm via n/c. Slightly tachypneic and short of breath at rest but becomes very SOB with activity. Chu placed this shift; UOP 1375. Blood glucose remains high at 474 at bedtime but levemir insulin received at 1700 yesterday. Pt is calm and cooperative, mostly oriented but is not oriented to situation at times. Overall VSS and NAD. Will continue to monitor.

## 2020-04-17 NOTE — NURSING
[facility]  ICU Multidisciplinary Bedside Rounds     UPDATE     Date: 4/17/2020      Plan of care reviewed with the following, Nurse, Charge Nurse, Physician and Pulm CC.       Needs/ Goals for the day:   CONTROL BLOOD SUGAR. wEAN O2    Level of Care: OK to Transfer

## 2020-04-17 NOTE — ASSESSMENT & PLAN NOTE
2/2 ARDS associated to COVID-19  Clinically improved and has remained stable on low flow NC although still dyspneic with minimal activity  Mobilize and remove garduno once able to tolerate some activity  Fluids stopped. Echo ok.   Continue empiric antibiotics given elevated procalcitonin which could be indicative of superimposed bacterial infection  Treat hyperglycemia  Ok to step down to floor today

## 2020-04-17 NOTE — PROVIDER TRANSFER
ICU transfer note    Ms Driscoll is a 48 yo female with schizophrenia, diabetes type 2 and HTN who presented with acute resp failure with hypoxia 2/2 COVID-19. Placed on NRB in the ED but quickly de-escalated to low flow NC at 4-5 L. Oxygen saturation increased appropriately but patient easily tachypneic with minimal activity or even by speaking one word at a time. Patient also dehydration and hyperglycemic but not in DKA or in HNKH. Admitted to ICU for close observation given high risk for further decompensation. Given isotonic fluids and initiated on SQ insulin injections. Patient felt better the next day and breathing more comfortably on low flow NC but glucose still very difficult to control. SQ dose increased. HgbA1c very high 12.4% on oral hypoglycemic agents. Would benefit from insulin injections at home. Of note, we do not have Invega monthly injection for her Schizophrenia, nor any other equivalent agent with similar regimen per my discussion with Psychiatrist. Please consult Psych if any issues with this. She is stable so far. Will need a couple more days in the hospital.

## 2020-04-17 NOTE — SUBJECTIVE & OBJECTIVE
No acute issues. On 4-5L NC.     Review of Systems   Constitutional: Positive for activity change. Negative for fatigue and fever.   Respiratory: Positive for cough and shortness of breath.      Objective:     Vital Signs (Most Recent):  Temp: 98.5 °F (36.9 °C) (04/17/20 0815)  Pulse: 90 (04/17/20 0900)  Resp: (!) 35 (04/17/20 0900)  BP: 112/81 (04/17/20 0900)  SpO2: (!) 89 % (04/17/20 0900) Vital Signs (24h Range):  Temp:  [97.8 °F (36.6 °C)-99.5 °F (37.5 °C)] 98.5 °F (36.9 °C)  Pulse:  [75-97] 90  Resp:  [16-48] 35  SpO2:  [81 %-99 %] 89 %  BP: ()/(62-92) 112/81     Weight: 136.1 kg (300 lb)  Body mass index is 51.49 kg/m².      Intake/Output Summary (Last 24 hours) at 4/17/2020 1325  Last data filed at 4/17/2020 0815  Gross per 24 hour   Intake 2040 ml   Output 2650 ml   Net -610 ml       Physical Exam   Constitutional: She is oriented to person, place, and time. She appears well-developed and well-nourished. No distress.   Eyes: Pupils are equal, round, and reactive to light. EOM are normal.   Cardiovascular: Normal rate and regular rhythm.   Pulmonary/Chest:   wheezing   Abdominal: Soft. Bowel sounds are normal.   Neurological: She is alert and oriented to person, place, and time.   Skin: Skin is warm and dry. She is not diaphoretic.   Nursing note and vitals reviewed.      Vents:       Lines/Drains/Airways     Peripherally Inserted Central Catheter Line            PICC Triple Lumen 04/16/20 1605 right basilic less than 1 day          Drain                 Urethral Catheter 04/16/20 2141 Latex less than 1 day          Peripheral Intravenous Line                 Peripheral IV - Single Lumen 04/16/20 1131 20 G Left Antecubital 1 day         Peripheral IV - Single Lumen 04/16/20 1200 18 G;1 3/4 in Left Upper Arm 1 day                Significant Labs:    CBC/Anemia Profile:  Recent Labs   Lab 04/16/20  1131 04/17/20  0435   WBC 14.44* 10.41   HGB 11.7* 10.3*   HCT 36.9* 32.8*    198   * 98   RDW  16.7* 16.3*   FERRITIN 214  --         Chemistries:  Recent Labs   Lab 04/16/20  1131 04/16/20  1857 04/17/20  0435   * 132* 129*   K 4.9 5.0 5.2*   CL 92* 97 96   CO2 21* 24 24   BUN 63* 55* 46*   CREATININE 2.3* 1.7* 1.5*   CALCIUM 8.0* 7.7* 7.5*   ALBUMIN 2.2* 2.0* 1.9*   PROT 8.0 7.0 6.8   BILITOT 0.8 0.6 0.5   ALKPHOS 136* 118 113   ALT 35 31 31   AST 35 29 25       All pertinent labs within the past 24 hours have been reviewed.    Significant Imaging:   I have reviewed and interpreted all pertinent imaging results/findings within the past 24 hours.

## 2020-04-17 NOTE — ASSESSMENT & PLAN NOTE
No acute issues  Correction: patient is on monthly Invega injections due today  We do not have this medication in our facility nor any other equivalent agent, per my discussion with Psychiatrist  Will start oral regimen for now

## 2020-04-18 LAB
ALBUMIN SERPL BCP-MCNC: 2.1 G/DL (ref 3.5–5.2)
ALP SERPL-CCNC: 95 U/L (ref 55–135)
ALT SERPL W/O P-5'-P-CCNC: 26 U/L (ref 10–44)
ANION GAP SERPL CALC-SCNC: 8 MMOL/L (ref 8–16)
AST SERPL-CCNC: 27 U/L (ref 10–40)
BASOPHILS # BLD AUTO: ABNORMAL K/UL (ref 0–0.2)
BASOPHILS NFR BLD: 0 % (ref 0–1.9)
BILIRUB SERPL-MCNC: 0.4 MG/DL (ref 0.1–1)
BUN SERPL-MCNC: 32 MG/DL (ref 6–20)
CALCIUM SERPL-MCNC: 8.1 MG/DL (ref 8.7–10.5)
CHLORIDE SERPL-SCNC: 103 MMOL/L (ref 95–110)
CO2 SERPL-SCNC: 27 MMOL/L (ref 23–29)
CREAT SERPL-MCNC: 1.2 MG/DL (ref 0.5–1.4)
CRP SERPL-MCNC: 33.2 MG/L (ref 0–8.2)
DIFFERENTIAL METHOD: ABNORMAL
EOSINOPHIL # BLD AUTO: ABNORMAL K/UL (ref 0–0.5)
EOSINOPHIL NFR BLD: 0 % (ref 0–8)
ERYTHROCYTE [DISTWIDTH] IN BLOOD BY AUTOMATED COUNT: 16.2 % (ref 11.5–14.5)
EST. GFR  (AFRICAN AMERICAN): >60 ML/MIN/1.73 M^2
EST. GFR  (NON AFRICAN AMERICAN): 53 ML/MIN/1.73 M^2
GLUCOSE SERPL-MCNC: 199 MG/DL (ref 70–110)
HCT VFR BLD AUTO: 34.1 % (ref 37–48.5)
HGB BLD-MCNC: 10.4 G/DL (ref 12–16)
IMM GRANULOCYTES # BLD AUTO: ABNORMAL K/UL (ref 0–0.04)
IMM GRANULOCYTES NFR BLD AUTO: ABNORMAL % (ref 0–0.5)
LYMPHOCYTES # BLD AUTO: ABNORMAL K/UL (ref 1–4.8)
LYMPHOCYTES NFR BLD: 16 % (ref 18–48)
MCH RBC QN AUTO: 30.1 PG (ref 27–31)
MCHC RBC AUTO-ENTMCNC: 30.5 G/DL (ref 32–36)
MCV RBC AUTO: 99 FL (ref 82–98)
METAMYELOCYTES NFR BLD MANUAL: 2 %
MONOCYTES # BLD AUTO: ABNORMAL K/UL (ref 0.3–1)
MONOCYTES NFR BLD: 8 % (ref 4–15)
MYELOCYTES NFR BLD MANUAL: 2 %
NEUTROPHILS # BLD AUTO: ABNORMAL K/UL (ref 1.8–7.7)
NEUTROPHILS NFR BLD: 68 % (ref 38–73)
NEUTS BAND NFR BLD MANUAL: 4 %
NRBC BLD-RTO: 1 /100 WBC
PLATELET # BLD AUTO: 231 K/UL (ref 150–350)
PMV BLD AUTO: 10.6 FL (ref 9.2–12.9)
POCT GLUCOSE: 153 MG/DL (ref 70–110)
POCT GLUCOSE: 217 MG/DL (ref 70–110)
POCT GLUCOSE: 235 MG/DL (ref 70–110)
POCT GLUCOSE: 277 MG/DL (ref 70–110)
POTASSIUM SERPL-SCNC: 4.4 MMOL/L (ref 3.5–5.1)
PROT SERPL-MCNC: 6.7 G/DL (ref 6–8.4)
RBC # BLD AUTO: 3.46 M/UL (ref 4–5.4)
SODIUM SERPL-SCNC: 138 MMOL/L (ref 136–145)
WBC # BLD AUTO: 11.5 K/UL (ref 3.9–12.7)

## 2020-04-18 PROCEDURE — 80053 COMPREHEN METABOLIC PANEL: CPT

## 2020-04-18 PROCEDURE — 85027 COMPLETE CBC AUTOMATED: CPT

## 2020-04-18 PROCEDURE — 85007 BL SMEAR W/DIFF WBC COUNT: CPT

## 2020-04-18 PROCEDURE — 86140 C-REACTIVE PROTEIN: CPT

## 2020-04-18 PROCEDURE — 36415 COLL VENOUS BLD VENIPUNCTURE: CPT

## 2020-04-18 PROCEDURE — A4216 STERILE WATER/SALINE, 10 ML: HCPCS | Performed by: INTERNAL MEDICINE

## 2020-04-18 PROCEDURE — 25000003 PHARM REV CODE 250: Performed by: INTERNAL MEDICINE

## 2020-04-18 PROCEDURE — 63600175 PHARM REV CODE 636 W HCPCS: Performed by: INTERNAL MEDICINE

## 2020-04-18 PROCEDURE — 11000001 HC ACUTE MED/SURG PRIVATE ROOM

## 2020-04-18 PROCEDURE — 63700000 PHARM REV CODE 250 ALT 637 W/O HCPCS: Performed by: INTERNAL MEDICINE

## 2020-04-18 RX ORDER — FAMOTIDINE 20 MG/1
20 TABLET, FILM COATED ORAL DAILY
Status: DISCONTINUED | OUTPATIENT
Start: 2020-04-18 | End: 2020-04-21 | Stop reason: HOSPADM

## 2020-04-18 RX ORDER — DOCUSATE SODIUM 100 MG/1
100 CAPSULE, LIQUID FILLED ORAL 2 TIMES DAILY
Status: DISCONTINUED | OUTPATIENT
Start: 2020-04-18 | End: 2020-04-21 | Stop reason: HOSPADM

## 2020-04-18 RX ORDER — LACTULOSE 10 G/15ML
20 SOLUTION ORAL ONCE
Status: COMPLETED | OUTPATIENT
Start: 2020-04-18 | End: 2020-04-18

## 2020-04-18 RX ADMIN — INSULIN ASPART 3 UNITS: 100 INJECTION, SOLUTION INTRAVENOUS; SUBCUTANEOUS at 08:04

## 2020-04-18 RX ADMIN — INSULIN ASPART 8 UNITS: 100 INJECTION, SOLUTION INTRAVENOUS; SUBCUTANEOUS at 12:04

## 2020-04-18 RX ADMIN — Medication 10 ML: at 12:04

## 2020-04-18 RX ADMIN — INSULIN ASPART 8 UNITS: 100 INJECTION, SOLUTION INTRAVENOUS; SUBCUTANEOUS at 08:04

## 2020-04-18 RX ADMIN — INSULIN ASPART 2 UNITS: 100 INJECTION, SOLUTION INTRAVENOUS; SUBCUTANEOUS at 08:04

## 2020-04-18 RX ADMIN — CEFTRIAXONE 1 G: 1 INJECTION, SOLUTION INTRAVENOUS at 12:04

## 2020-04-18 RX ADMIN — AZITHROMYCIN 250 MG: 250 TABLET, FILM COATED ORAL at 09:04

## 2020-04-18 RX ADMIN — INSULIN ASPART 4 UNITS: 100 INJECTION, SOLUTION INTRAVENOUS; SUBCUTANEOUS at 12:04

## 2020-04-18 RX ADMIN — DOCUSATE SODIUM 100 MG: 100 CAPSULE, LIQUID FILLED ORAL at 12:04

## 2020-04-18 RX ADMIN — ENOXAPARIN SODIUM 40 MG: 100 INJECTION SUBCUTANEOUS at 08:04

## 2020-04-18 RX ADMIN — FAMOTIDINE 20 MG: 20 TABLET ORAL at 09:04

## 2020-04-18 RX ADMIN — INSULIN DETEMIR 30 UNITS: 100 INJECTION, SOLUTION SUBCUTANEOUS at 08:04

## 2020-04-18 RX ADMIN — Medication 10 ML: at 06:04

## 2020-04-18 RX ADMIN — LACTULOSE 20 G: 10 SOLUTION ORAL at 12:04

## 2020-04-18 RX ADMIN — INSULIN ASPART 4 UNITS: 100 INJECTION, SOLUTION INTRAVENOUS; SUBCUTANEOUS at 06:04

## 2020-04-18 RX ADMIN — INSULIN ASPART 8 UNITS: 100 INJECTION, SOLUTION INTRAVENOUS; SUBCUTANEOUS at 05:04

## 2020-04-18 NOTE — ASSESSMENT & PLAN NOTE
2/2 ARDS associated to COVID-19  Clinically improved and has remained stable on low flow NC although still dyspneic with minimal activity  Will mobilize and remove garduno once able to tolerate some activity  Fluids stopped. Echo ok.   Continue Ceftriaxone/Azithromycin empirically given elevated procalcitonin which is c/w superimposed bacterial infection  Treat hyperglycemia

## 2020-04-18 NOTE — PLAN OF CARE
Problem: Adult Inpatient Plan of Care  Goal: Plan of Care Review  Outcome: Ongoing, Progressing  Goal: Patient-Specific Goal (Individualization)  Outcome: Ongoing, Progressing  Goal: Absence of Hospital-Acquired Illness or Injury  Outcome: Ongoing, Progressing  Goal: Optimal Comfort and Wellbeing  Outcome: Ongoing, Progressing  Goal: Readiness for Transition of Care  Outcome: Ongoing, Progressing  Goal: Rounds/Family Conference  Outcome: Ongoing, Progressing     Problem: Diabetes Comorbidity  Goal: Blood Glucose Level Within Desired Range  Outcome: Ongoing, Progressing     Problem: Infection  Goal: Infection Symptom Resolution  Outcome: Ongoing, Progressing     Problem: Fall Injury Risk  Goal: Absence of Fall and Fall-Related Injury  Outcome: Ongoing, Progressing     Problem: Skin Injury Risk Increased  Goal: Skin Health and Integrity  Outcome: Ongoing, Progressing    Patient educated on plan of care early in shift to encourage participation in care. Bedside hourly rounding implemented to ensure patient safety, comfort, pain control/relief. Fall precautions maintained including tele-sitter, 3/4 rails up, bed alarm on, call light and personal belongings within reach

## 2020-04-18 NOTE — PROGRESS NOTES
"[facility] Medicine  Progress Note    Patient Name: Ava Driscoll  MRN: [id number]  Patient Class: IP- Inpatient   Admission Date: 4/16/2020  Length of Stay: 2 days  Attending Physician: Tiago Jackson MD  Primary Care Provider: Keerthi Murphy MD        Subjective:     Principal Problem:Acute respiratory failure with hypoxia        HPI:  49 year old female with diabetes mellitus type 2, obesity, hyperlipidemia, shchizophrenia and hypertension who presented with shortness of breath and cough "for the past couple of days". Feels likes symptoms have worsened overtime. Denied fever, chills, chest pain, abdominal pain, nausea/vomiting or diarrhea. Patient states compliance with her medications, including the ones for diabetes. Is not on insulin.    In the ED, patient was tachypneic and hypoxic to the 70s at room air. Afebrile. Labwork significant for     Overview/Hospital Course:  Ms Driscoll presented with acute respiratory failure with hypoxia 2/2 COVID-19 infection. Placed on NRB in the ED but quickly de-escalated to low flow NC at 4-5 L. Oxygen saturation increased appropriately but patient easily tachypneic with minimal activity or even by speaking one word at a time. Patient also dehydration and hyperglycemic but not in DKA or in HNKH. Admitted to ICU for close observation given high risk for further decompensation. Given isotonic fluids and initiated on insulin injections. Patient felt better the next day and breathing more comfortably on low flow NC but glucose still very difficult to control. SQ dose increased. HgbA1c very high 12.4% on oral hypoglycemic agents. Would benefit from insulin injections at home. Stepped down on 4/17.     Interval History: Reports mild SOB with cough. Has constipation. Afebrile. No events overnight. Nursing staff has been unable to get telemetry to read on the patient.    Review of Systems   Constitutional: Negative for fever.   HENT: Negative for facial " swelling.    Eyes: Negative for visual disturbance.   Respiratory: Positive for cough and shortness of breath.    Gastrointestinal: Negative for diarrhea and vomiting.     Objective:     Vital Signs (Most Recent):  Temp: 98.2 °F (36.8 °C) (04/18/20 1100)  Pulse: 92 (04/18/20 1100)  Resp: 20 (04/18/20 0700)  BP: 119/80 (04/18/20 1100)  SpO2: 95 % (04/18/20 1100) Vital Signs (24h Range):  Temp:  [98 °F (36.7 °C)-98.2 °F (36.8 °C)] 98.2 °F (36.8 °C)  Pulse:  [86-92] 92  Resp:  [20-22] 20  SpO2:  [92 %-97 %] 95 %  BP: (115-134)/(80-86) 119/80     Weight: 136.1 kg (300 lb)  Body mass index is 51.49 kg/m².    Intake/Output Summary (Last 24 hours) at 4/18/2020 1452  Last data filed at 4/18/2020 1206  Gross per 24 hour   Intake 1010 ml   Output 3050 ml   Net -2040 ml      Physical Exam   Constitutional: She is oriented to person, place, and time.   HENT:   Head: Normocephalic and atraumatic.   Eyes: EOM are normal.   Neck: Normal range of motion. Neck supple.   Cardiovascular: Normal rate, regular rhythm and normal heart sounds.   Pulmonary/Chest:   Mild wheezing   Abdominal: Soft. Bowel sounds are normal.   Musculoskeletal: Normal range of motion.   Neurological: She is alert and oriented to person, place, and time.   Psychiatric: She has a normal mood and affect.       Significant Labs:   Blood Culture: No results for input(s): LABBLOO in the last 48 hours.  CBC:   Recent Labs   Lab 04/17/20  0435 04/18/20  0320   WBC 10.41 11.50   HGB 10.3* 10.4*   HCT 32.8* 34.1*    231     CMP:   Recent Labs   Lab 04/16/20  1857 04/17/20  0435 04/18/20  0320   * 129* 138   K 5.0 5.2* 4.4   CL 97 96 103   CO2 24 24 27   * 461* 199*   BUN 55* 46* 32*   CREATININE 1.7* 1.5* 1.2   CALCIUM 7.7* 7.5* 8.1*   PROT 7.0 6.8 6.7   ALBUMIN 2.0* 1.9* 2.1*   BILITOT 0.6 0.5 0.4   ALKPHOS 118 113 95   AST 29 25 27   ALT 31 31 26   ANIONGAP 11 9 8   EGFRNONAA 35* 41* 53*     All pertinent labs within the past 24 hours have been  reviewed.    Significant Imaging: I have reviewed all pertinent imaging results/findings within the past 24 hours.      Assessment/Plan:      * Acute respiratory failure with hypoxia  2/2 ARDS associated to COVID-19  Clinically improved and has remained stable on low flow NC although still dyspneic with minimal activity  Will mobilize and remove garduno once able to tolerate some activity  Fluids stopped. Echo ok.   Continue Ceftriaxone/Azithromycin empirically given elevated procalcitonin which is c/w superimposed bacterial infection  Treat hyperglycemia          COVID-19 virus infection  - COVID-19 testing   - Infection Control notified     - Isolation:   - Airborne, Contact and Droplet Precautions  - Cohort patients into COVID units  - N95 masks must be fit tested, wear eye protection  - 20 second hand hygiene              - Limit visitors per hospital policy              - Consolidating lab draws, nursing care, provider visits, and interventions    - Diagnostics: (leukopenia, hyponatremia, hyperferritinemia, elevated troponin, elevated d-dimer, age, and comorbidities are significant predictors of poor clinical outcome)    - Management:  Supplemental O2 to maintain SpO2 >92%  Continuous/intermittent Pulse Ox  Albuterol treatment PRN    Advance Care Planning   Full    Pneumonia due to COVID-19 virus  Cont Ceftriaxone/Azithromycin for now  Cont rest of plan as described above      Lactic acidosis  2/2 dehydration  Resolved with fluids      Hyponatremia with extracellular fluid depletion  Likely due to dehydration s/p fluids  Monitor Na and Rx prn       Type 2 diabetes mellitus with hyperglycemia, without long-term current use of insulin  With hyperglycemia  No HNKH or DKA  Increase SQ insulin regimen for goal -180  Will need insulin at discharge since HgbA1c is 12.4%  Needs insulin teaching  Hold oral hypoglycemic agents    BMI 50.0-59.9, adult  Will discuss weight loss when appropriate      Paranoid  schizophrenia  No acute issues  Correction: patient is on monthly Invega injections due today  We do not have this medication in our facility nor any other equivalent agent, per my discussion with Psychiatrist  Will start oral regimen if available        VTE Risk Mitigation (From admission, onward)         Ordered     enoxaparin injection 40 mg  Daily      04/17/20 1725     IP VTE HIGH RISK PATIENT  Once      04/16/20 1519     Place sequential compression device  Until discontinued      04/16/20 1519                      Tiago Jackson MD  Department of Hospital Medicine   [facility]

## 2020-04-18 NOTE — NURSING
Patient remains in NAD. Patient states name and that she is in hospital but unaware why she is in hospital. Respiratory rate even, unlabored on 5L provided tylenol for 2/10 generalized pain. Will assess med effectiveness. Provided scheduled insulin including levemir and sliding scale novolog for sugar level of 381. Tele sitter in place for safety/fall precautions. Patient updated on plan of care with no requests voiced. Call light and belongings within reach with 3/4 rails up. Bed alarm on.

## 2020-04-18 NOTE — SUBJECTIVE & OBJECTIVE
Interval History: Reports mild SOB with cough. Has constipation. Afebrile. No events overnight. Nursing staff has been unable to get telemetry to read on the patient.    Review of Systems   Constitutional: Negative for fever.   HENT: Negative for facial swelling.    Eyes: Negative for visual disturbance.   Respiratory: Positive for cough and shortness of breath.    Gastrointestinal: Negative for diarrhea and vomiting.     Objective:     Vital Signs (Most Recent):  Temp: 98.2 °F (36.8 °C) (04/18/20 1100)  Pulse: 92 (04/18/20 1100)  Resp: 20 (04/18/20 0700)  BP: 119/80 (04/18/20 1100)  SpO2: 95 % (04/18/20 1100) Vital Signs (24h Range):  Temp:  [98 °F (36.7 °C)-98.2 °F (36.8 °C)] 98.2 °F (36.8 °C)  Pulse:  [86-92] 92  Resp:  [20-22] 20  SpO2:  [92 %-97 %] 95 %  BP: (115-134)/(80-86) 119/80     Weight: 136.1 kg (300 lb)  Body mass index is 51.49 kg/m².    Intake/Output Summary (Last 24 hours) at 4/18/2020 1452  Last data filed at 4/18/2020 1206  Gross per 24 hour   Intake 1010 ml   Output 3050 ml   Net -2040 ml      Physical Exam   Constitutional: She is oriented to person, place, and time.   HENT:   Head: Normocephalic and atraumatic.   Eyes: EOM are normal.   Neck: Normal range of motion. Neck supple.   Cardiovascular: Normal rate, regular rhythm and normal heart sounds.   Pulmonary/Chest:   Mild wheezing   Abdominal: Soft. Bowel sounds are normal.   Musculoskeletal: Normal range of motion.   Neurological: She is alert and oriented to person, place, and time.   Psychiatric: She has a normal mood and affect.       Significant Labs:   Blood Culture: No results for input(s): LABBLOO in the last 48 hours.  CBC:   Recent Labs   Lab 04/17/20  0435 04/18/20  0320   WBC 10.41 11.50   HGB 10.3* 10.4*   HCT 32.8* 34.1*    231     CMP:   Recent Labs   Lab 04/16/20  1857 04/17/20  0435 04/18/20  0320   * 129* 138   K 5.0 5.2* 4.4   CL 97 96 103   CO2 24 24 27   * 461* 199*   BUN 55* 46* 32*   CREATININE 1.7*  1.5* 1.2   CALCIUM 7.7* 7.5* 8.1*   PROT 7.0 6.8 6.7   ALBUMIN 2.0* 1.9* 2.1*   BILITOT 0.6 0.5 0.4   ALKPHOS 118 113 95   AST 29 25 27   ALT 31 31 26   ANIONGAP 11 9 8   EGFRNONAA 35* 41* 53*     All pertinent labs within the past 24 hours have been reviewed.    Significant Imaging: I have reviewed all pertinent imaging results/findings within the past 24 hours.

## 2020-04-18 NOTE — PLAN OF CARE
Problem: Adult Inpatient Plan of Care  Goal: Plan of Care Review  Outcome: Ongoing, Progressing  Flowsheets (Taken 4/18/2020 1412)  Plan of Care Reviewed With: patient  Goal: Patient-Specific Goal (Individualization)  Outcome: Ongoing, Progressing  Goal: Absence of Hospital-Acquired Illness or Injury  Outcome: Ongoing, Progressing  Intervention: Identify and Manage Fall Risk  Flowsheets (Taken 4/18/2020 1412)  Safety Promotion/Fall Prevention: assistive device/personal item within reach; bed alarm set; Fall Risk reviewed with patient/family; medications reviewed; high risk medications identified; nonskid shoes/socks when out of bed; instructed to call staff for mobility; side rails raised x 2  Goal: Optimal Comfort and Wellbeing  Outcome: Ongoing, Progressing  Goal: Readiness for Transition of Care  Outcome: Ongoing, Progressing  Goal: Rounds/Family Conference  Outcome: Ongoing, Progressing     Problem: Diabetes Comorbidity  Goal: Blood Glucose Level Within Desired Range  Outcome: Ongoing, Progressing     Problem: Infection  Goal: Infection Symptom Resolution  Outcome: Ongoing, Progressing  Intervention: Prevent or Manage Infection  Flowsheets (Taken 4/18/2020 1412)  Fever Reduction/Comfort Measures: medication administered  Isolation Precautions: airborne precautions maintained; droplet precautions maintained; contact precautions maintained     Problem: Fall Injury Risk  Goal: Absence of Fall and Fall-Related Injury  Outcome: Ongoing, Progressing     Problem: Skin Injury Risk Increased  Goal: Skin Health and Integrity  Outcome: Ongoing, Progressing  Intervention: Optimize Skin Protection  Flowsheets (Taken 4/18/2020 1412)  Pressure Reduction Techniques: weight shift assistance provided; frequent weight shift encouraged  Skin Protection: tubing/devices free from skin contact  Head of Bed (HOB): HOB at 30-45 degrees   Pt alert able to make needs known,jose meds well,denies pain,O2 in use,abx in progress no s/s  adverse reaction noted,food and fluid intake good,safety maintained,continue monitoring.

## 2020-04-18 NOTE — ASSESSMENT & PLAN NOTE
No acute issues  Correction: patient is on monthly Invega injections due today  We do not have this medication in our facility nor any other equivalent agent, per my discussion with Psychiatrist  Will start oral regimen if available

## 2020-04-18 NOTE — NURSING
Labs obtained from PICC line and sent to lab by RN. Patient remains resting calmly on 4L humidified oxygen with 98% noted on continuous pulse ox. No requests at this time. Call light in reach

## 2020-04-19 LAB
ANION GAP SERPL CALC-SCNC: 10 MMOL/L (ref 8–16)
BASOPHILS # BLD AUTO: 0.02 K/UL (ref 0–0.2)
BASOPHILS NFR BLD: 0.3 % (ref 0–1.9)
BUN SERPL-MCNC: 22 MG/DL (ref 6–20)
CALCIUM SERPL-MCNC: 8.3 MG/DL (ref 8.7–10.5)
CHLORIDE SERPL-SCNC: 105 MMOL/L (ref 95–110)
CO2 SERPL-SCNC: 23 MMOL/L (ref 23–29)
CREAT SERPL-MCNC: 1 MG/DL (ref 0.5–1.4)
DIFFERENTIAL METHOD: ABNORMAL
EOSINOPHIL # BLD AUTO: 0.4 K/UL (ref 0–0.5)
EOSINOPHIL NFR BLD: 5 % (ref 0–8)
ERYTHROCYTE [DISTWIDTH] IN BLOOD BY AUTOMATED COUNT: 16.4 % (ref 11.5–14.5)
EST. GFR  (AFRICAN AMERICAN): >60 ML/MIN/1.73 M^2
EST. GFR  (NON AFRICAN AMERICAN): >60 ML/MIN/1.73 M^2
GLUCOSE SERPL-MCNC: 109 MG/DL (ref 70–110)
HCT VFR BLD AUTO: 38.9 % (ref 37–48.5)
HGB BLD-MCNC: 11.6 G/DL (ref 12–16)
IMM GRANULOCYTES # BLD AUTO: 0.33 K/UL (ref 0–0.04)
IMM GRANULOCYTES NFR BLD AUTO: 4.2 % (ref 0–0.5)
LYMPHOCYTES # BLD AUTO: 2 K/UL (ref 1–4.8)
LYMPHOCYTES NFR BLD: 25.3 % (ref 18–48)
MAGNESIUM SERPL-MCNC: 2.5 MG/DL (ref 1.6–2.6)
MCH RBC QN AUTO: 30.7 PG (ref 27–31)
MCHC RBC AUTO-ENTMCNC: 29.8 G/DL (ref 32–36)
MCV RBC AUTO: 103 FL (ref 82–98)
MONOCYTES # BLD AUTO: 0.8 K/UL (ref 0.3–1)
MONOCYTES NFR BLD: 10.3 % (ref 4–15)
NEUTROPHILS # BLD AUTO: 4.4 K/UL (ref 1.8–7.7)
NEUTROPHILS NFR BLD: 54.9 % (ref 38–73)
NRBC BLD-RTO: 0 /100 WBC
PLATELET # BLD AUTO: 253 K/UL (ref 150–350)
PMV BLD AUTO: 9.9 FL (ref 9.2–12.9)
POCT GLUCOSE: 120 MG/DL (ref 70–110)
POCT GLUCOSE: 213 MG/DL (ref 70–110)
POCT GLUCOSE: 225 MG/DL (ref 70–110)
POCT GLUCOSE: 251 MG/DL (ref 70–110)
POTASSIUM SERPL-SCNC: 4.6 MMOL/L (ref 3.5–5.1)
PROCALCITONIN SERPL IA-MCNC: 0.14 NG/ML
RBC # BLD AUTO: 3.78 M/UL (ref 4–5.4)
SODIUM SERPL-SCNC: 138 MMOL/L (ref 136–145)
WBC # BLD AUTO: 7.93 K/UL (ref 3.9–12.7)

## 2020-04-19 PROCEDURE — 11000001 HC ACUTE MED/SURG PRIVATE ROOM

## 2020-04-19 PROCEDURE — 80048 BASIC METABOLIC PNL TOTAL CA: CPT

## 2020-04-19 PROCEDURE — 85025 COMPLETE CBC W/AUTO DIFF WBC: CPT

## 2020-04-19 PROCEDURE — 25000003 PHARM REV CODE 250: Performed by: INTERNAL MEDICINE

## 2020-04-19 PROCEDURE — 84145 PROCALCITONIN (PCT): CPT

## 2020-04-19 PROCEDURE — 63600175 PHARM REV CODE 636 W HCPCS: Performed by: INTERNAL MEDICINE

## 2020-04-19 PROCEDURE — A4216 STERILE WATER/SALINE, 10 ML: HCPCS | Performed by: INTERNAL MEDICINE

## 2020-04-19 PROCEDURE — 63700000 PHARM REV CODE 250 ALT 637 W/O HCPCS: Performed by: INTERNAL MEDICINE

## 2020-04-19 PROCEDURE — 83735 ASSAY OF MAGNESIUM: CPT

## 2020-04-19 PROCEDURE — 94761 N-INVAS EAR/PLS OXIMETRY MLT: CPT

## 2020-04-19 RX ADMIN — INSULIN DETEMIR 30 UNITS: 100 INJECTION, SOLUTION SUBCUTANEOUS at 08:04

## 2020-04-19 RX ADMIN — Medication 10 ML: at 12:04

## 2020-04-19 RX ADMIN — INSULIN ASPART 8 UNITS: 100 INJECTION, SOLUTION INTRAVENOUS; SUBCUTANEOUS at 07:04

## 2020-04-19 RX ADMIN — DOCUSATE SODIUM 100 MG: 100 CAPSULE, LIQUID FILLED ORAL at 08:04

## 2020-04-19 RX ADMIN — Medication 10 ML: at 06:04

## 2020-04-19 RX ADMIN — INSULIN ASPART 3 UNITS: 100 INJECTION, SOLUTION INTRAVENOUS; SUBCUTANEOUS at 08:04

## 2020-04-19 RX ADMIN — FAMOTIDINE 20 MG: 20 TABLET ORAL at 08:04

## 2020-04-19 RX ADMIN — INSULIN ASPART 8 UNITS: 100 INJECTION, SOLUTION INTRAVENOUS; SUBCUTANEOUS at 12:04

## 2020-04-19 RX ADMIN — AZITHROMYCIN 250 MG: 250 TABLET, FILM COATED ORAL at 08:04

## 2020-04-19 RX ADMIN — INSULIN ASPART 8 UNITS: 100 INJECTION, SOLUTION INTRAVENOUS; SUBCUTANEOUS at 04:04

## 2020-04-19 RX ADMIN — CEFTRIAXONE 1 G: 1 INJECTION, SOLUTION INTRAVENOUS at 01:04

## 2020-04-19 RX ADMIN — ENOXAPARIN SODIUM 40 MG: 100 INJECTION SUBCUTANEOUS at 08:04

## 2020-04-19 NOTE — NURSING
Patient sitting up in bed eating dinner. No distress. No complaint/ needs verbalized at this time. O2 in place via NC. HR 98 02 95%. Safety measures maintained. Will continue to monitor.

## 2020-04-19 NOTE — PROGRESS NOTES
"[facility] Medicine  Progress Note    Patient Name: Ava Driscoll  MRN: [id number]  Patient Class: IP- Inpatient   Admission Date: 4/16/2020  Length of Stay: 3 days  Attending Physician: Tiago Jackson MD  Primary Care Provider: Keerthi Murphy MD        Subjective:     Principal Problem:Acute respiratory failure with hypoxia        HPI:  49 year old female with diabetes mellitus type 2, obesity, hyperlipidemia, shchizophrenia and hypertension who presented with shortness of breath and cough "for the past couple of days". Feels likes symptoms have worsened overtime. Denied fever, chills, chest pain, abdominal pain, nausea/vomiting or diarrhea. Patient states compliance with her medications, including the ones for diabetes. Is not on insulin.    In the ED, patient was tachypneic and hypoxic to the 70s at room air. Afebrile. Labwork significant for     Overview/Hospital Course:  Ms Driscoll presented with acute respiratory failure with hypoxia 2/2 COVID-19 infection. Placed on NRB in the ED but quickly de-escalated to low flow NC at 4-5 L. Oxygen saturation increased appropriately but patient easily tachypneic with minimal activity or even by speaking one word at a time. Patient also dehydration and hyperglycemic but not in DKA or in HNKH. Admitted to ICU for close observation given high risk for further decompensation. Given isotonic fluids and initiated on insulin injections. Patient felt better the next day and breathing more comfortably on low flow NC but glucose still very difficult to control. SQ dose increased. HgbA1c very high 12.4% on oral hypoglycemic agents. Would benefit from insulin injections at home. Stepped down on 4/17.     Interval History: Reports mild SOB with cough. Afebrile. Patient had RR up to 28 and required 6L O2. Nursing staff has been unable to get telemetry to read on the patient. Vlad removed as patient is using bedside commode.     Review of Systems "   Constitutional: Negative for fever.   HENT: Negative for facial swelling.    Eyes: Negative for visual disturbance.   Respiratory: Positive for cough and shortness of breath.    Gastrointestinal: Negative for diarrhea and vomiting.     Objective:     Vital Signs (Most Recent):  Temp: 98.3 °F (36.8 °C) (04/19/20 1110)  Pulse: 94 (04/19/20 1110)  Resp: (!) 28 (04/19/20 1110)  BP: 115/75 (04/19/20 1110)  SpO2: 95 % (04/19/20 1110) Vital Signs (24h Range):  Temp:  [89.4 °F (31.9 °C)-98.7 °F (37.1 °C)] 98.3 °F (36.8 °C)  Pulse:  [94-99] 94  Resp:  [28-32] 28  SpO2:  [93 %-97 %] 95 %  BP: (104-118)/(71-84) 115/75     Weight: 136.1 kg (300 lb)  Body mass index is 51.49 kg/m².    Intake/Output Summary (Last 24 hours) at 4/19/2020 1419  Last data filed at 4/19/2020 0615  Gross per 24 hour   Intake 120 ml   Output 1900 ml   Net -1780 ml      Physical Exam   Constitutional: She is oriented to person, place, and time.   HENT:   Head: Normocephalic and atraumatic.   Eyes: EOM are normal.   Neck: Normal range of motion. Neck supple.   Cardiovascular: Normal rate, regular rhythm and normal heart sounds.   Pulmonary/Chest:   Mild wheezing   Abdominal: Soft. Bowel sounds are normal.   Musculoskeletal: Normal range of motion.   Neurological: She is alert and oriented to person, place, and time.   Psychiatric: She has a normal mood and affect.       Significant Labs:   Blood Culture: No results for input(s): LABBLOO in the last 48 hours.  CBC:   Recent Labs   Lab 04/18/20 0320 04/19/20 0456   WBC 11.50 7.93   HGB 10.4* 11.6*   HCT 34.1* 38.9    253     CMP:   Recent Labs   Lab 04/18/20 0320 04/19/20 0456    138   K 4.4 4.6    105   CO2 27 23   * 109   BUN 32* 22*   CREATININE 1.2 1.0   CALCIUM 8.1* 8.3*   PROT 6.7  --    ALBUMIN 2.1*  --    BILITOT 0.4  --    ALKPHOS 95  --    AST 27  --    ALT 26  --    ANIONGAP 8 10   EGFRNONAA 53* >60     All pertinent labs within the past 24 hours have been  reviewed.    Significant Imaging: I have reviewed all pertinent imaging results/findings within the past 24 hours.      Assessment/Plan:      * Acute respiratory failure with hypoxia  2/2 ARDS associated to COVID-19  Clinically improved and has remained stable on low flow NC although still dyspneic with minimal activity  Will mobilize and remove garduno once able to tolerate some activity  Fluids stopped. Echo ok.   Continue Ceftriaxone/Azithromycin empirically given elevated procalcitonin which is c/w superimposed bacterial infection  Treat hyperglycemia    COVID-19 virus infection  - COVID-19 testing   - Infection Control notified     - Isolation:   - Airborne, Contact and Droplet Precautions  - Cohort patients into COVID units  - N95 masks must be fit tested, wear eye protection  - 20 second hand hygiene              - Limit visitors per hospital policy              - Consolidating lab draws, nursing care, provider visits, and interventions    - Diagnostics: (leukopenia, hyponatremia, hyperferritinemia, elevated troponin, elevated d-dimer, age, and comorbidities are significant predictors of poor clinical outcome)    - Management:  Supplemental O2 to maintain SpO2 >92%  Continuous/intermittent Pulse Ox  Albuterol treatment PRN    Advance Care Planning   Full    Pneumonia due to COVID-19 virus  Cont Ceftriaxone/Azithromycin for now  Cont rest of plan as described above    Lactic acidosis  2/2 dehydration  Resolved with fluids    Hyponatremia with extracellular fluid depletion  Likely due to dehydration s/p fluids  Monitor Na and Rx prn     Type 2 diabetes mellitus with hyperglycemia, without long-term current use of insulin  With hyperglycemia  No HNKH or DKA  Increase SQ insulin regimen for goal -180  Will need insulin at discharge since HgbA1c is 12.4%  Needs insulin teaching  Hold oral hypoglycemic agents    BMI 50.0-59.9, adult  Will discuss weight loss when appropriate    Paranoid schizophrenia  No acute  issues  Correction: patient is on monthly Invega injections due today  We do not have this medication in our facility nor any other equivalent agent, per my discussion with Psychiatrist  Will start oral regimen if available      VTE Risk Mitigation (From admission, onward)         Ordered     enoxaparin injection 40 mg  Daily      04/17/20 1725     IP VTE HIGH RISK PATIENT  Once      04/16/20 1519     Place sequential compression device  Until discontinued      04/16/20 1519                      Tiago Jackson MD  Department of Hospital Medicine   [facility]

## 2020-04-19 NOTE — NURSING
Patient sitting up in bed eating breakfast. AM medications given per MD order. Call light in reach. Safety measures maintained. Will continue to monitor.

## 2020-04-19 NOTE — NURSING
Patient asleep but arousable. 02 in place via NC. HR 96 Sat 96% on 6L via continuous pulse ox. No signs distress. Call light in reach. Will continue to monitor.

## 2020-04-19 NOTE — NURSING
Patient awake sitting up in bed. 02 in place via NC. HR 93 Sat 95% on 6L via continuous pulse ox. No signs distress. Call light in reach. Will continue to monitor.  in bed.

## 2020-04-19 NOTE — NURSING
Patient sitting up in bed. No signs of distress. Continuous pulse ox in place. 97% 6L via NC. No SOB verbalized. Assessment done. Insulin given as ordered. Plan of care reviewed. All questions answered. Safety measures explained and in place. Call light in reach. Will continue to monitor.

## 2020-04-19 NOTE — NURSING
Patient asleep but arousable. No distress. 02 in place via NC. HR 97. 02 98% on continuous monitor. Safety measures maintained. Will continue to monitor

## 2020-04-19 NOTE — NURSING
Patient up in bed. No distress. 02 in place via NC. Insulin given per MD order. Sliding scale held per MD order. Safety measures maintained. Will continue to monitor.

## 2020-04-19 NOTE — SUBJECTIVE & OBJECTIVE
Interval History: Reports mild SOB with cough. Afebrile. No events overnight. Nursing staff has been unable to get telemetry to read on the patient.    Review of Systems   Constitutional: Negative for fever.   HENT: Negative for facial swelling.    Eyes: Negative for visual disturbance.   Respiratory: Positive for cough and shortness of breath.    Gastrointestinal: Negative for diarrhea and vomiting.     Objective:     Vital Signs (Most Recent):  Temp: 98.3 °F (36.8 °C) (04/19/20 1110)  Pulse: 94 (04/19/20 1110)  Resp: (!) 28 (04/19/20 1110)  BP: 115/75 (04/19/20 1110)  SpO2: 95 % (04/19/20 1110) Vital Signs (24h Range):  Temp:  [89.4 °F (31.9 °C)-98.7 °F (37.1 °C)] 98.3 °F (36.8 °C)  Pulse:  [94-99] 94  Resp:  [28-32] 28  SpO2:  [93 %-97 %] 95 %  BP: (104-118)/(71-84) 115/75     Weight: 136.1 kg (300 lb)  Body mass index is 51.49 kg/m².    Intake/Output Summary (Last 24 hours) at 4/19/2020 1419  Last data filed at 4/19/2020 0615  Gross per 24 hour   Intake 120 ml   Output 1900 ml   Net -1780 ml      Physical Exam   Constitutional: She is oriented to person, place, and time.   HENT:   Head: Normocephalic and atraumatic.   Eyes: EOM are normal.   Neck: Normal range of motion. Neck supple.   Cardiovascular: Normal rate, regular rhythm and normal heart sounds.   Pulmonary/Chest:   Mild wheezing   Abdominal: Soft. Bowel sounds are normal.   Musculoskeletal: Normal range of motion.   Neurological: She is alert and oriented to person, place, and time.   Psychiatric: She has a normal mood and affect.       Significant Labs:   Blood Culture: No results for input(s): LABBLOO in the last 48 hours.  CBC:   Recent Labs   Lab 04/18/20  0320 04/19/20  0456   WBC 11.50 7.93   HGB 10.4* 11.6*   HCT 34.1* 38.9    253     CMP:   Recent Labs   Lab 04/18/20  0320 04/19/20  0456    138   K 4.4 4.6    105   CO2 27 23   * 109   BUN 32* 22*   CREATININE 1.2 1.0   CALCIUM 8.1* 8.3*   PROT 6.7  --    ALBUMIN 2.1*   --    BILITOT 0.4  --    ALKPHOS 95  --    AST 27  --    ALT 26  --    ANIONGAP 8 10   EGFRNONAA 53* >60     All pertinent labs within the past 24 hours have been reviewed.    Significant Imaging: I have reviewed all pertinent imaging results/findings within the past 24 hours.

## 2020-04-19 NOTE — NURSING
Assisted patient to and from bedside commode. Unable to have bowel movement. Stable with transfer. 02 kept in place via NC. No distress. Orders obtained to remove garduno. Will remove and continue to monitor.

## 2020-04-20 PROBLEM — Z75.8 DISCHARGE PLANNING ISSUES: Status: ACTIVE | Noted: 2020-04-20

## 2020-04-20 LAB
BACTERIA BLD CULT: NORMAL
BACTERIA BLD CULT: NORMAL
BASOPHILS # BLD AUTO: 0.03 K/UL (ref 0–0.2)
BASOPHILS NFR BLD: 0.4 % (ref 0–1.9)
CRP SERPL-MCNC: 15.9 MG/L (ref 0–8.2)
DIFFERENTIAL METHOD: ABNORMAL
EOSINOPHIL # BLD AUTO: 0.4 K/UL (ref 0–0.5)
EOSINOPHIL NFR BLD: 5.5 % (ref 0–8)
ERYTHROCYTE [DISTWIDTH] IN BLOOD BY AUTOMATED COUNT: 16 % (ref 11.5–14.5)
HCT VFR BLD AUTO: 39.5 % (ref 37–48.5)
HGB BLD-MCNC: 11.7 G/DL (ref 12–16)
IMM GRANULOCYTES # BLD AUTO: 0.22 K/UL (ref 0–0.04)
IMM GRANULOCYTES NFR BLD AUTO: 3.3 % (ref 0–0.5)
LYMPHOCYTES # BLD AUTO: 2 K/UL (ref 1–4.8)
LYMPHOCYTES NFR BLD: 29.1 % (ref 18–48)
MCH RBC QN AUTO: 30.1 PG (ref 27–31)
MCHC RBC AUTO-ENTMCNC: 29.6 G/DL (ref 32–36)
MCV RBC AUTO: 102 FL (ref 82–98)
MONOCYTES # BLD AUTO: 0.8 K/UL (ref 0.3–1)
MONOCYTES NFR BLD: 12.1 % (ref 4–15)
NEUTROPHILS # BLD AUTO: 3.3 K/UL (ref 1.8–7.7)
NEUTROPHILS NFR BLD: 49.6 % (ref 38–73)
NRBC BLD-RTO: 0 /100 WBC
PLATELET # BLD AUTO: 263 K/UL (ref 150–350)
PMV BLD AUTO: 9.6 FL (ref 9.2–12.9)
POCT GLUCOSE: 160 MG/DL (ref 70–110)
POCT GLUCOSE: 174 MG/DL (ref 70–110)
POCT GLUCOSE: 218 MG/DL (ref 70–110)
POCT GLUCOSE: 295 MG/DL (ref 70–110)
RBC # BLD AUTO: 3.89 M/UL (ref 4–5.4)
WBC # BLD AUTO: 6.7 K/UL (ref 3.9–12.7)

## 2020-04-20 PROCEDURE — 97161 PT EVAL LOW COMPLEX 20 MIN: CPT

## 2020-04-20 PROCEDURE — 63700000 PHARM REV CODE 250 ALT 637 W/O HCPCS: Performed by: INTERNAL MEDICINE

## 2020-04-20 PROCEDURE — 90792 PR PSYCHIATRIC DIAGNOSTIC EVALUATION W/MEDICAL SERVICES: ICD-10-PCS | Mod: ,,, | Performed by: PSYCHIATRY & NEUROLOGY

## 2020-04-20 PROCEDURE — 36415 COLL VENOUS BLD VENIPUNCTURE: CPT

## 2020-04-20 PROCEDURE — 25000003 PHARM REV CODE 250: Performed by: INTERNAL MEDICINE

## 2020-04-20 PROCEDURE — 94761 N-INVAS EAR/PLS OXIMETRY MLT: CPT

## 2020-04-20 PROCEDURE — 97165 OT EVAL LOW COMPLEX 30 MIN: CPT

## 2020-04-20 PROCEDURE — 90792 PSYCH DIAG EVAL W/MED SRVCS: CPT | Mod: ,,, | Performed by: PSYCHIATRY & NEUROLOGY

## 2020-04-20 PROCEDURE — A4216 STERILE WATER/SALINE, 10 ML: HCPCS | Performed by: INTERNAL MEDICINE

## 2020-04-20 PROCEDURE — 27000221 HC OXYGEN, UP TO 24 HOURS

## 2020-04-20 PROCEDURE — 63600175 PHARM REV CODE 636 W HCPCS: Performed by: INTERNAL MEDICINE

## 2020-04-20 PROCEDURE — 85025 COMPLETE CBC W/AUTO DIFF WBC: CPT

## 2020-04-20 PROCEDURE — 86140 C-REACTIVE PROTEIN: CPT

## 2020-04-20 PROCEDURE — 11000001 HC ACUTE MED/SURG PRIVATE ROOM

## 2020-04-20 RX ADMIN — DOCUSATE SODIUM 100 MG: 100 CAPSULE, LIQUID FILLED ORAL at 08:04

## 2020-04-20 RX ADMIN — INSULIN ASPART 1 UNITS: 100 INJECTION, SOLUTION INTRAVENOUS; SUBCUTANEOUS at 10:04

## 2020-04-20 RX ADMIN — CEFTRIAXONE 1 G: 1 INJECTION, SOLUTION INTRAVENOUS at 12:04

## 2020-04-20 RX ADMIN — INSULIN ASPART 8 UNITS: 100 INJECTION, SOLUTION INTRAVENOUS; SUBCUTANEOUS at 08:04

## 2020-04-20 RX ADMIN — Medication 10 ML: at 05:04

## 2020-04-20 RX ADMIN — INSULIN DETEMIR 30 UNITS: 100 INJECTION, SOLUTION SUBCUTANEOUS at 10:04

## 2020-04-20 RX ADMIN — INSULIN ASPART 8 UNITS: 100 INJECTION, SOLUTION INTRAVENOUS; SUBCUTANEOUS at 04:04

## 2020-04-20 RX ADMIN — ENOXAPARIN SODIUM 40 MG: 100 INJECTION SUBCUTANEOUS at 08:04

## 2020-04-20 RX ADMIN — Medication 10 ML: at 12:04

## 2020-04-20 RX ADMIN — AZITHROMYCIN 250 MG: 250 TABLET, FILM COATED ORAL at 08:04

## 2020-04-20 RX ADMIN — FAMOTIDINE 20 MG: 20 TABLET ORAL at 08:04

## 2020-04-20 RX ADMIN — INSULIN ASPART 8 UNITS: 100 INJECTION, SOLUTION INTRAVENOUS; SUBCUTANEOUS at 12:04

## 2020-04-20 NOTE — PROGRESS NOTES
"[facility]  Progress Note    Patient Name: Ava Driscoll  MRN: [id number]  Patient Class: IP- Inpatient   Admission Date: 4/16/2020  Length of Stay: 4 days  Attending Physician: Tiago Jackson MD  Primary Care Provider: Keerthi Murphy MD        Subjective:     Principal Problem:Acute respiratory failure with hypoxia        HPI:  49 year old female with diabetes mellitus type 2, obesity, hyperlipidemia, shchizophrenia and hypertension who presented with shortness of breath and cough "for the past couple of days". Feels likes symptoms have worsened overtime. Denied fever, chills, chest pain, abdominal pain, nausea/vomiting or diarrhea. Patient states compliance with her medications, including the ones for diabetes. Is not on insulin.    In the ED, patient was tachypneic and hypoxic to the 70s at room air. Afebrile. Labwork significant for     Overview/Hospital Course:  Ms Driscoll presented with acute respiratory failure with hypoxia 2/2 COVID-19 infection. Placed on NRB in the ED but quickly de-escalated to low flow NC at 4-5 L. Oxygen saturation increased appropriately but patient easily tachypneic with minimal activity or even by speaking one word at a time. Patient also dehydration and hyperglycemic but not in DKA or in HNKH. Admitted to ICU for close observation given high risk for further decompensation. Given isotonic fluids and initiated on insulin injections. Patient felt better the next day and breathing more comfortably on low flow NC but glucose still very difficult to control. SQ dose increased. HgbA1c very high 12.4% on oral hypoglycemic agents. Would benefit from insulin injections at home. Stepped down on 4/17. Chu catheter removed and patient using the bedside commode. PT/OT eval ordered.    Interval History: Reports mild SOB with cough. Afebrile. No events overnight. O2 sat 97% on 5L O2, but nursing staff titrating down O2. Chu removed and PT/OT ordered. Encouraging " patient to get out of bed to use bedside commode.      Review of Systems   Constitutional: Negative for fever.   HENT: Negative for facial swelling.    Eyes: Negative for visual disturbance.   Respiratory: Positive for cough and shortness of breath.    Gastrointestinal: Negative for diarrhea and vomiting.     Objective:     Vital Signs (Most Recent):  Temp: 98.2 °F (36.8 °C) (04/20/20 0700)  Pulse: 85 (04/20/20 0700)  Resp: (!) 22 (04/20/20 0700)  BP: (!) 145/71 (04/20/20 0700)  SpO2: 95 % (04/20/20 1100) Vital Signs (24h Range):  Temp:  [97.8 °F (36.6 °C)-98.5 °F (36.9 °C)] 98.2 °F (36.8 °C)  Pulse:  [85-93] 85  Resp:  [22-30] 22  SpO2:  [94 %-98 %] 95 %  BP: (118-145)/(63-95) 145/71     Weight: 136.1 kg (300 lb)  Body mass index is 51.49 kg/m².    Intake/Output Summary (Last 24 hours) at 4/20/2020 1208  Last data filed at 4/19/2020 1600  Gross per 24 hour   Intake 240 ml   Output 900 ml   Net -660 ml      Physical Exam   Constitutional: She is oriented to person, place, and time.   HENT:   Head: Normocephalic and atraumatic.   Eyes: EOM are normal.   Neck: Normal range of motion. Neck supple.   Cardiovascular: Normal rate, regular rhythm and normal heart sounds.   Pulmonary/Chest:   Mild wheezing   Abdominal: Soft. Bowel sounds are normal.   Musculoskeletal: Normal range of motion.   Neurological: She is alert and oriented to person, place, and time.   Psychiatric: She has a normal mood and affect.       Significant Labs:   Blood Culture: No results for input(s): LABBLOO in the last 48 hours.  CBC:   Recent Labs   Lab 04/19/20  0456 04/20/20  0624   WBC 7.93 6.70   HGB 11.6* 11.7*   HCT 38.9 39.5    263     CMP:   Recent Labs   Lab 04/19/20  0456      K 4.6      CO2 23      BUN 22*   CREATININE 1.0   CALCIUM 8.3*   ANIONGAP 10   EGFRNONAA >60     All pertinent labs within the past 24 hours have been reviewed.    Significant Imaging: I have reviewed all pertinent imaging results/findings  within the past 24 hours.      Assessment/Plan:      * Acute respiratory failure with hypoxia  2/2 ARDS associated to COVID-19  Clinically improved and has remained stable on low flow NC although still dyspneic with minimal activity  Will mobilize and remove garduno once able to tolerate some activity  Fluids stopped. Echo ok.   Continue Ceftriaxone/Azithromycin empirically given elevated procalcitonin which is c/w superimposed bacterial infection  Treat hyperglycemia    COVID-19 virus infection  - COVID-19 testing   - Infection Control notified     - Isolation:   - Airborne, Contact and Droplet Precautions  - Cohort patients into COVID units  - N95 masks must be fit tested, wear eye protection  - 20 second hand hygiene              - Limit visitors per hospital policy              - Consolidating lab draws, nursing care, provider visits, and interventions    - Diagnostics: (leukopenia, hyponatremia, hyperferritinemia, elevated troponin, elevated d-dimer, age, and comorbidities are significant predictors of poor clinical outcome)    - Management:  Supplemental O2 to maintain SpO2 >92%  Continuous/intermittent Pulse Ox  Albuterol treatment PRN    Advance Care Planning   Full    Pneumonia due to COVID-19 virus  Cont Ceftriaxone/Azithromycin for now  Cont rest of plan as described above    Lactic acidosis  2/2 dehydration  Resolved with fluids    Hyponatremia with extracellular fluid depletion  Likely due to dehydration s/p fluids  Monitor Na and Rx prn     Type 2 diabetes mellitus with hyperglycemia, without long-term current use of insulin  With hyperglycemia  No HNKH or DKA  Increase SQ insulin regimen for goal -180  Will need insulin at discharge since HgbA1c is 12.4%  Needs insulin teaching  Hold oral hypoglycemic agents    BMI 50.0-59.9, adult  Will discuss weight loss when appropriate    Paranoid schizophrenia  No acute issues  Correction: patient is on monthly Invega injections due today  We do not have  this medication in our facility nor any other equivalent agent, per my discussion with Psychiatrist  Will start oral regimen if available    Discharge planning issues  Pending d/c home, will aggressively titrate down O2      VTE Risk Mitigation (From admission, onward)         Ordered     enoxaparin injection 40 mg  Daily      04/17/20 1725     IP VTE HIGH RISK PATIENT  Once      04/16/20 1519     Place sequential compression device  Until discontinued      04/16/20 1519                      Tiago Jackson MD  Department of Hospital Medicine   [facility]

## 2020-04-20 NOTE — NURSING
Unable to flush 2/3 PICC lines. Discussed w/ MD. Ok to discontinue PICC line and use peripheral IV. FERNANDO PICC discontinued. Tip intact. Patient tolerated well. L hand single lumen peripheral IV inserted without difficulty. Safety measures maintained. Call light in reach. Will continue to monitor.

## 2020-04-20 NOTE — PLAN OF CARE
04/20/20 0954   Discharge Reassessment   Assessment Type Discharge Planning Reassessment   Provided patient/caregiver education on the expected discharge date and the discharge plan No   Do you have any problems affording any of your prescribed medications? TBD   Discharge Plan A Home with family;Home Health   Discharge Plan B Home with family   DME Needed Upon Discharge  none   Patient choice form signed by patient/caregiver N/A   Anticipated Discharge Disposition Home   Can the patient/caregiver answer the patient profile reliably? Yes, cognitively intact   How does the patient rate their overall health at the present time? Fair   Describe the patient's ability to walk at the present time. Minor restrictions or changes   How often would a person be available to care for the patient? Whenever needed   Number of comorbid conditions (as recorded on the chart) Two

## 2020-04-20 NOTE — NURSING
Patient assisted to and from bedside commode. No distress. 02 remained in place via NC with Sats 95% on 3L. Denied SOB. Safety measures maintained. Call light in reach. Will continue to monitor.

## 2020-04-20 NOTE — PT/OT/SLP EVAL
"Occupational Therapy   Evaluation and Discharge Note    Name: Ava Driscoll  MRN: [id number]  Admitting Diagnosis:  Acute respiratory failure with hypoxia      Recommendations:     Discharge Recommendations: home(with family support)  Discharge Equipment Recommendations:  none  Barriers to discharge:  None    Assessment:     Ava Driscoll is a 49 y.o. female with a medical diagnosis of Acute respiratory failure with hypoxia. At this time, patient is functioning at their prior level of function and does not require further acute OT services.     Plan:     During this hospitalization, patient does not require further acute OT services.  Please re-consult if situation changes.    · Plan of Care Reviewed with: patient    Subjective     Chief Complaint: "I just get a little short winded when I walk around."  Patient/Family Comments/goals: to go home    Occupational Profile:  Living Environment: lives alone in an apartment, her son lives with her "sometimes"  Previous level of function: independent  Roles and Routines: able to perform ADL's and IADL's  Equipment Used at home:  none  Assistance upon Discharge: from her son    Pain/Comfort:  · Pain Rating 1: 0/10    Patients cultural, spiritual, Protestant conflicts given the current situation: no    Objective:     Communicated with: nurse prior to session.  Patient found up in chair with oxygen, pulse ox (continuous), peripheral IV upon OT entry to room.    General Precautions: Standard, airborne, contact, droplet, fall, respiratory, special contact   Orthopedic Precautions:N/A   Braces: N/A     Occupational Performance:    Bed Mobility:    · Patient seated in bedside chair following PT evaluation (please see PT note for details)    Functional Mobility/Transfers:  · Patient completed Sit <> Stand Transfer with contact guard assistance  with  no assistive device   · Functional Mobility: ambulated to and from the sink with supervision with no AD    Activities of Daily " Living:  · Feeding:  independence    · Grooming: independence standing at the sink  · Upper Body Dressing: independence seated EOB  · Lower Body Dressing: moderate assistance to reuben socks. Patient reports that she is able to perform in her bedroom but not here because of the setup    Cognitive/Visual Perceptual:  Cognitive/Psychosocial Skills:     -       Oriented to: Person, Place and Situation   -       Follows Commands/attention:Follows one-step commands  -       Communication: clear/fluent  -       Memory: No Deficits noted  -       Safety awareness/insight to disability: intact   -       Mood/Affect/Coping skills/emotional control: Appropriate to situation    Physical Exam:  Balance:    -       sit and standing balance good  Postural examination/scapula alignment:    -       Rounded shoulders  Skin integrity: Visible skin intact  Upper Extremity Range of Motion:  -       Right Upper Extremity: WFL  -       Left Upper Extremity: WFL  Upper Extremity Strength: -       Right Upper Extremity: WFL  -       Left Upper Extremity: WFL    AMPAC 6 Click ADL:  AMPAC Total Score: 21    Treatment & Education:  Evaluation; Educated patient to call for nurse when she was ready to transfer back to bed and when she needed to use the bathroom for safety.  Patient verbalized understanding and agreement.  Education:    Patient left up in chair with all lines intact, call button in reach and nurse notified    GOALS:   Multidisciplinary Problems     Occupational Therapy Goals     Not on file          Multidisciplinary Problems (Resolved)        Problem: Occupational Therapy Goal    Goal Priority Disciplines Outcome Interventions   Occupational Therapy Goal   (Resolved)     OT, PT/OT Met                    History:     Past Medical History:   Diagnosis Date    Behavioral problem     Diabetes mellitus     History of psychiatric care     History of psychiatric hospitalization     Hx of psychiatric care     Psychiatric problem      Retinopathy due to secondary diabetes     Schizophrenia     Therapy        Past Surgical History:   Procedure Laterality Date     SECTION      COLONOSCOPY N/A 2020    Procedure: COLONOSCOPY;  Surgeon: Edvin Zuniga II, MD;  [facility]: [facility];  Service: Endoscopy;  Laterality: N/A;    HYSTERECTOMY         Time Tracking:     OT Date of Treatment: 20  OT Start Time: 1504  OT Stop Time: 1514  OT Total Time (min): 10 min    Billable Minutes:Evaluation 10 minutes    RYAN Jones, MS  2020

## 2020-04-20 NOTE — ASSESSMENT & PLAN NOTE
Patient with a long chronic history of paranoid schizophrenia which can have exacerbations when not medicated.  In the past, she responded to Invega oral and then transition to Invega Sustenna.  She is now due for her Invega Sustenna injection.  Not currently gravely disabled but has potential to worsen.  Not currently in need of PEC or acute psychiatric admission or one-to-one sitter.  I have contacted our hospital pharmacist to see if it would be possible to obtain oral Invega or Invega Sustenna from the [facility] [place] pharmacy where I know that it is available.  If the oral form is available, she used to take Invega 3 mg in the morning and 6 mg nightly but would just recommend to start the Invega 6 mg nightly for now.  If we are able to obtain Invega Sustenna, we will need care management to contact her mental health clinic and see what dosage is appropriate for her.  She will be able to follow up with [facility] for ongoing mental health care upon discharge.

## 2020-04-20 NOTE — CONSULTS
[facility]  Psychiatry  Consult Note    Patient Name: Ava Driscoll  MRN: [id number]   Code Status: Full Code  Admission Date: 4/16/2020  Hospital Length of Stay: 4 days  Attending Physician: Tiago Jackson MD  Primary Care Provider: Keerthi Murphy MD    Current Legal Status: N/A    Patient information was obtained from patient, chart review, nursing,  and ER records.   Inpatient consult to Psychiatry  Consult performed by: Dario Alves MD  Consult ordered by: Tiago Jackson MD        Subjective:     Principal Problem:Acute respiratory failure with hypoxia    Chief Complaint:  Schizophrenia/psychosis     HPI: Patient Ava Driscoll presents to the hospital with coughing and shortness of breath and found to be COVID-19 positive.  She was admitted to the ICU and has been stepped down to the medical floor now.  Psych consult was placed because of patient's history of schizophrenia and is due for her monthly injection of Invega Sustenna but this is not carried in our hospital.  Patient is seen in her hospital room while she is on the phone with her sister.  She does not hang up the phone yet laced the phone down on her bed and talks to me for a little while.  Patient is oriented to name, month/year, hospital.  She tells me that she has schizophrenia which was diagnosed years ago and she has been doing well on Invega Sustenna from the local mental health clinic, [facility].  She is not sure of the dose of the injection but says that she has been on it for years.  She says that she was due to get her injection on the date that she got admitted here to the hospital.  She says that when she does not get her medicines, her schizophrenia acts up and she can hear voices.  I asked when was the last time this has happened and she gets very worried and says I do not want it to happen.  She is not sure when the last time she had a relapse of her schizophrenia.  She denies any depression, anxiety, allen.   Nursing reports that she is somewhat odd in her interactions but cooperative with care.  Patient is not sure of any other medications that she has taken over time other than Seroquel years ago but Invega works better.  She denies any suicidal ideations or suicidal history.    Records indicate that I saw her as a consult in 2014 and she was subsequently admitted to psychiatric facility in our system after the consult.  She was taken off of Seroquel due to over-sedation.  The consult was for poor compliance with medications and was presenting almost in a catatonic state.  On the inpatient psychiatric facility, she was started on Invega p.o. and subsequently changed to the monthly injections in the mental health clinic after discharge from the hospital.    Hospital Course: No notes on file         Patient History           Medical as of 2020     Past Medical History     Diagnosis Date Comments Source    Behavioral problem -- -- Provider    Diabetes mellitus -- -- Provider    History of psychiatric care -- -- Provider    History of psychiatric hospitalization -- -- Provider    Psychiatric problem -- -- Provider    Retinopathy due to secondary diabetes -- -- Provider    Schizophrenia -- -- Provider                  Surgical as of 2020     Past Surgical History     Procedure Laterality Date Comments Source    HYSTERECTOMY -- -- -- Provider     SECTION -- -- -- Provider    COLONOSCOPY N/A 2020 Procedure: COLONOSCOPY;  Surgeon: Edvin Zuniga II, MD;  Location: [facility];  Service: Endoscopy;  Laterality: N/A; Provider                  Family as of 2020     Problem Relation Name Age of Onset Comments Source    Arthritis Mother -- -- -- Provider    Diabetes type II Father -- -- -- Provider    Breast cancer Maternal Aunt -- 80 unilat Provider    Ovarian cancer Neg Hx -- -- -- Provider            Tobacco Use as of 2020     Smoking Status Smoking Start Date Smoking Quit Date  Packs/Day Years Used    Never Smoker -- -- -- --    Types Comments Smokeless Tobacco Status Smokeless Tobacco Quit Date Source    -- -- Never Used -- Provider            Alcohol Use as of 4/20/2020     Alcohol Use Drinks/Week Alcohol/Week Comments Source    No -- -- -- Provider    Frequency Standard Drinks Binge Drinking        -- -- --              Drug Use as of 4/20/2020     Drug Use Types Frequency Comments Source    No -- -- -- Provider            Sexual Activity as of 4/20/2020     Sexually Active Birth Control Partners Comments Source    Never -- -- -- Provider            Activities of Daily Living as of 4/20/2020     Activities of Daily Living Question Response Comments Source    Patient feels they ought to cut down on drinking/drug use Not Asked -- Provider    Patient annoyed by others criticizing their drinking/drug use Not Asked -- Provider    Patient has felt bad or guilty about drinking/drug use Not Asked -- Provider    Patient has had a drink/used drugs as an eye opener in the AM Not Asked -- Provider            Social Documentation as of 4/20/2020    None           Occupational as of 4/20/2020    None           Socioeconomic as of 4/20/2020     Marital Status Spouse Name Number of Children Years Education Education Level Preferred Language Ethnicity Race Source    Single -- -- -- -- English /Black Black or  Provider    Financial Resource Strain Food Insecurity: Worry Food Insecurity: Inability Transportation Needs: Medical Transportation Needs: Non-medical    -- -- -- -- --            Pertinent History     Question Response Comments    Lives with -- --    Place in Birth Order -- --    Lives in home --    Number of Siblings -- --    Raised by biological parents --    Legal Involvement none --    Childhood Trauma uneventful --    Criminal History of arrest and incarceration --    Financial Status disabled --    Highest Level of Education unfinished college --    Does  patient have access to a firearm? No --     Service No --    Primary Leisure Activity -- --    Spirituality actively participates in organized [employer] --        Past Medical History:   Diagnosis Date    Behavioral problem     Diabetes mellitus     History of psychiatric care     History of psychiatric hospitalization     Psychiatric problem     Retinopathy due to secondary diabetes     Schizophrenia      Past Surgical History:   Procedure Laterality Date     SECTION      COLONOSCOPY N/A 2020    Procedure: COLONOSCOPY;  Surgeon: Edvin Zuniga II, MD;  Location: [facility];  Service: Endoscopy;  Laterality: N/A;    HYSTERECTOMY       Family History     Problem Relation (Age of Onset)    Arthritis Mother    Breast cancer Maternal Aunt (80)    Diabetes type II Father        Tobacco Use    Smoking status: Never Smoker    Smokeless tobacco: Never Used   Substance and Sexual Activity    Alcohol use: No    Drug use: No    Sexual activity: Never     Review of patient's allergies indicates:   Allergen Reactions    Ace inhibitors      Cough       No current facility-administered medications on file prior to encounter.      Current Outpatient Medications on File Prior to Encounter   Medication Sig    ergocalciferol (ERGOCALCIFEROL) 50,000 unit Cap Vitamin D2 1,250 mcg (50,000 unit) capsule   Take 1 capsule every week by oral route.    glimepiride (AMARYL) 4 MG tablet Take 1 tablet (4 mg total) by mouth before breakfast.    levocetirizine (XYZAL) 5 MG tablet Take 1 tablet (5 mg total) by mouth every evening.    losartan (COZAAR) 25 MG tablet Take 1 tablet (25 mg total) by mouth once daily.    metFORMIN (GLUCOPHAGE) 1000 MG tablet TAKE ONE TABLET BY MOUTH TWICE DAILY WITH MEALS    pravastatin (PRAVACHOL) 40 MG tablet Take 1 tablet (40 mg total) by mouth once daily.    blood sugar diagnostic Strp Check BS once a day.    blood-glucose meter kit Check BS once a day.    ibuprofen  "(ADVIL,MOTRIN) 800 MG tablet     INVEGA SUSTENNA 156 mg/mL Syrg injection     lancets (LANCETS,ULTRA THIN) Misc Check BS once a day.    methylPREDNISolone (MEDROL DOSEPACK) 4 mg tablet use as directed     Psychotherapeutics (From admission, onward)    None        Review of Systems   Constitutional: Negative for activity change and fatigue.   Respiratory: Positive for cough and shortness of breath.    Musculoskeletal: Positive for myalgias.   Psychiatric/Behavioral: Negative for decreased concentration, dysphoric mood, hallucinations, sleep disturbance and suicidal ideas. The patient is not nervous/anxious.      Strengths and Liabilities: Liability: Patient has poor health., Liability: Patient lacks coping skills.    Objective:     Vital Signs (Most Recent):  Temp: 97.6 °F (36.4 °C) (04/20/20 1100)  Pulse: 92 (04/20/20 1100)  Resp: (!) 22 (04/20/20 1100)  BP: 117/72 (04/20/20 1100)  SpO2: 95 % (04/20/20 1100) Vital Signs (24h Range):  Temp:  [97.6 °F (36.4 °C)-98.5 °F (36.9 °C)] 97.6 °F (36.4 °C)  Pulse:  [85-93] 92  Resp:  [22-30] 22  SpO2:  [94 %-98 %] 95 %  BP: (117-145)/(63-95) 117/72     Height: 5' 4" (162.6 cm)  Weight: 136.1 kg (300 lb)  Body mass index is 51.49 kg/m².      Intake/Output Summary (Last 24 hours) at 4/20/2020 1239  Last data filed at 4/19/2020 1600  Gross per 24 hour   Intake 240 ml   Output 900 ml   Net -660 ml       Physical Exam   Psychiatric:   EXAMINATION    CONSTITUTIONAL  General Appearance:  Hospital attire, talking on the phone    MUSCULOSKELETAL  Muscle Strength and Tone:  Normal  Abnormal Involuntary Movements:  None noted  Gait and Station:  Not observed    PSYCHIATRIC MENTAL STATUS EXAM   Level of Consciousness:  Awake and alert  Orientation:  Name, place, date  Grooming:  Limited to hospital setting  Psychomotor Behavior:  Normal  Speech:  Soft volume, not pressured  Language:  No abnormalities  Mood:  Good  Affect:  Blunted, somewhat restricted  Thought Process:  [place], " simple  Associations:  Intact  Thought Content:  Denies suicidal/homicidal/psychosis  Memory:  Intact to recent and remote but some difficulty with details such as medication dosage  Attention:  Distracted  Fund of Knowledge:  Simple conversation  Insight:  Fair into mental illness and need for care  Judgment:  Fair into cooperation with care          Significant Labs:   Last 24 Hours:   Recent Lab Results       04/20/20  1133   04/20/20  0804   04/20/20  0624   04/19/20  2041   04/19/20  1613        Baso #     0.03         Basophil%     0.4         CRP     15.9         Differential Method     Automated         Eos #     0.4         Eosinophil%     5.5         Gran # (ANC)     3.3         Gran%     49.6         Hematocrit     39.5         Hemoglobin     11.7         Immature Grans (Abs)     0.22  Comment:  Mild elevation in immature granulocytes is non specific and   can be seen in a variety of conditions including stress response,   acute inflammation, trauma and pregnancy. Correlation with other   laboratory and clinical findings is essential.           Immature Granulocytes     3.3         Lymph #     2.0         Lymph%     29.1         MCH     30.1         MCHC     29.6         MCV     102         Mono #     0.8         Mono%     12.1         MPV     9.6         nRBC     0         Platelets     263         POCT Glucose 218 160   251 225     RBC     3.89         RDW     16.0         WBC     6.70                            All pertinent labs within the past 24 hours have been reviewed.    Significant Imaging: I have reviewed all pertinent imaging results/findings within the past 24 hours.    Assessment/Plan:     Paranoid schizophrenia  Patient with a long chronic history of paranoid schizophrenia which can have exacerbations when not medicated.  In the past, she responded to Invega oral and then transition to Invega Sustenna.  She is now due for her Invega Sustenna injection.  Not currently gravely disabled but  has potential to worsen.  Not currently in need of PEC or acute psychiatric admission or one-to-one sitter.  I have contacted our hospital pharmacist to see if it would be possible to obtain oral Invega or Invega Sustenna from the [facility] where I know that it is available.  If the oral form is available, she used to take Invega 3 mg in the morning and 6 mg nightly but would just recommend to start the Invega 6 mg nightly for now.  If we are able to obtain Invega Sustenna, we will need care management to contact her mental health clinic and see what dosage is appropriate for her.  She will be able to follow up with [facility] for ongoing mental health care upon discharge.         ADDENDUM:  Spoke with pharmacy and they have procured a supply of paliperidone (Invega) 3mg.  Recommend to start paliperidone 6mg PO nightly.  Invega Sustenna is a unique medication that you can easily start and stop it with the PO form, without a titration.    Total Time:  60 minutes      Dario Alves MD   Psychiatry  [facility]

## 2020-04-20 NOTE — SUBJECTIVE & OBJECTIVE
Interval History: Reports mild SOB with cough. Afebrile. No events overnight. O2 sat 97% on 5L O2, but nursing staff titrating down O2. Chu removed and PT/OT ordered. Encouraging patient to get out of bed to use bedside commode.      Review of Systems   Constitutional: Negative for fever.   HENT: Negative for facial swelling.    Eyes: Negative for visual disturbance.   Respiratory: Positive for cough and shortness of breath.    Gastrointestinal: Negative for diarrhea and vomiting.     Objective:     Vital Signs (Most Recent):  Temp: 98.2 °F (36.8 °C) (04/20/20 0700)  Pulse: 85 (04/20/20 0700)  Resp: (!) 22 (04/20/20 0700)  BP: (!) 145/71 (04/20/20 0700)  SpO2: 95 % (04/20/20 1100) Vital Signs (24h Range):  Temp:  [97.8 °F (36.6 °C)-98.5 °F (36.9 °C)] 98.2 °F (36.8 °C)  Pulse:  [85-93] 85  Resp:  [22-30] 22  SpO2:  [94 %-98 %] 95 %  BP: (118-145)/(63-95) 145/71     Weight: 136.1 kg (300 lb)  Body mass index is 51.49 kg/m².    Intake/Output Summary (Last 24 hours) at 4/20/2020 1208  Last data filed at 4/19/2020 1600  Gross per 24 hour   Intake 240 ml   Output 900 ml   Net -660 ml      Physical Exam   Constitutional: She is oriented to person, place, and time.   HENT:   Head: Normocephalic and atraumatic.   Eyes: EOM are normal.   Neck: Normal range of motion. Neck supple.   Cardiovascular: Normal rate, regular rhythm and normal heart sounds.   Pulmonary/Chest:   Mild wheezing   Abdominal: Soft. Bowel sounds are normal.   Musculoskeletal: Normal range of motion.   Neurological: She is alert and oriented to person, place, and time.   Psychiatric: She has a normal mood and affect.       Significant Labs:   Blood Culture: No results for input(s): LABBLOO in the last 48 hours.  CBC:   Recent Labs   Lab 04/19/20  0456 04/20/20  0624   WBC 7.93 6.70   HGB 11.6* 11.7*   HCT 38.9 39.5    263     CMP:   Recent Labs   Lab 04/19/20  0456      K 4.6      CO2 23      BUN 22*   CREATININE 1.0   CALCIUM  8.3*   ANIONGAP 10   EGFRNONAA >60     All pertinent labs within the past 24 hours have been reviewed.    Significant Imaging: I have reviewed all pertinent imaging results/findings within the past 24 hours.

## 2020-04-20 NOTE — NURSING
Oxygen titrated to 3L via NC per MD orders. Sat remained 95% via continuous pulse ox. HR 89. No signs distress. No SOB verbalized. Will continue to monitor.

## 2020-04-20 NOTE — NURSING
Patient sitting up in bed eating lunch. No signs distress. 02 in place via NC. No SOB verbalized. Meds given per MD orders. Safety measures maintained. Call light in reach. Will continue to monitor.

## 2020-04-20 NOTE — NURSING
Patient asleep but arousable. No signs of distress. Continuous pulse ox in place. 98% 6L via NC. No SOB verbalized. Assessment done. Insulin and AM meds given as ordered. Plan of care reviewed. All questions answered. Safety measures explained and in place. Call light in reach. Will continue to monitor. Patient left sitting up in bed eating breakfast.

## 2020-04-20 NOTE — PLAN OF CARE
Problem: Occupational Therapy Goal  Goal: Occupational Therapy Goal  Outcome: Met    Patient tolerated evaluation well, patient with no need for skilled OT services at this time. RYAN Jones, MS

## 2020-04-20 NOTE — HPI
Patient Ava Driscoll presents to the hospital with coughing and shortness of breath and found to be COVID-19 positive.  She was admitted to the ICU and has been stepped down to the medical floor now.  Psych consult was placed because of patient's history of schizophrenia and is due for her monthly injection of Invega Sustenna but this is not carried in our hospital.  Patient is seen in her hospital room while she is on the phone with her sister.  She does not hang up the phone yet laced the phone down on her bed and talks to me for a little while.  Patient is oriented to name, month/year, hospital.  She tells me that she has schizophrenia which was diagnosed years ago and she has been doing well on Invega Sustenna from the local mental health clinic, [facility].  She is not sure of the dose of the injection but says that she has been on it for years.  She says that she was due to get her injection on the date that she got admitted here to the hospital.  She says that when she does not get her medicines, her schizophrenia acts up and she can hear voices.  I asked when was the last time this has happened and she gets very worried and says I do not want it to happen.  She is not sure when the last time she had a relapse of her schizophrenia.  She denies any depression, anxiety, allen.  Nursing reports that she is somewhat odd in her interactions but cooperative with care.  Patient is not sure of any other medications that she has taken over time other than Seroquel years ago but Invega works better.  She denies any suicidal ideations or suicidal history.    Records indicate that I saw her as a consult in June of 2014 and she was subsequently admitted to psychiatric facility in our system after the consult.  She was taken off of Seroquel due to over-sedation.  The consult was for poor compliance with medications and was presenting almost in a catatonic state.  On the inpatient psychiatric facility, she was started on  Invega p.o. and subsequently changed to the monthly injections in the mental health clinic after discharge from the hospital.

## 2020-04-20 NOTE — SUBJECTIVE & OBJECTIVE
Patient History           Medical as of 2020     Past Medical History     Diagnosis Date Comments Source    Behavioral problem -- -- Provider    Diabetes mellitus -- -- Provider    History of psychiatric care -- -- Provider    History of psychiatric hospitalization -- -- Provider    Psychiatric problem -- -- Provider    Retinopathy due to secondary diabetes -- -- Provider    Schizophrenia -- -- Provider                  Surgical as of 2020     Past Surgical History     Procedure Laterality Date Comments Source    HYSTERECTOMY -- -- -- Provider     SECTION -- -- -- Provider    COLONOSCOPY N/A 2020 Procedure: COLONOSCOPY;  Surgeon: Edvin Zuniga II, MD;  Location: [facility];  Service: Endoscopy;  Laterality: N/A; Provider                  Family as of 2020     Problem Relation Name Age of Onset Comments Source    Arthritis Mother -- -- -- Provider    Diabetes type II Father -- -- -- Provider    Breast cancer Maternal Aunt -- 80 unilat Provider    Ovarian cancer Neg Hx -- -- -- Provider            Tobacco Use as of 2020     Smoking Status Smoking Start Date Smoking Quit Date Packs/Day Years Used    Never Smoker -- -- -- --    Types Comments Smokeless Tobacco Status Smokeless Tobacco Quit Date Source    -- -- Never Used -- Provider            Alcohol Use as of 2020     Alcohol Use Drinks/Week Alcohol/Week Comments Source    No -- -- -- Provider    Frequency Standard Drinks Binge Drinking        -- -- --              Drug Use as of 2020     Drug Use Types Frequency Comments Source    No -- -- -- Provider            Sexual Activity as of 2020     Sexually Active Birth Control Partners Comments Source    Never -- -- -- Provider            Activities of Daily Living as of 2020     Activities of Daily Living Question Response Comments Source    Patient feels they ought to cut down on drinking/drug use Not Asked -- Provider    Patient annoyed by others  criticizing their drinking/drug use Not Asked -- Provider    Patient has felt bad or guilty about drinking/drug use Not Asked -- Provider    Patient has had a drink/used drugs as an eye opener in the AM Not Asked -- Provider            Social Documentation as of 2020    None           Occupational as of 2020    None           Socioeconomic as of 2020     Marital Status Spouse Name Number of Children Years Education Education Level Preferred Language Ethnicity Race Source    Single -- -- -- -- English /Black Black or  Provider    Financial Resource Strain Food Insecurity: Worry Food Insecurity: Inability Transportation Needs: Medical Transportation Needs: Non-medical    -- -- -- -- --            Pertinent History     Question Response Comments    Lives with -- --    Place in Birth Order -- --    Lives in home --    Number of Siblings -- --    Raised by biological parents --    Legal Involvement none --    Childhood Trauma uneventful --    Criminal History of arrest and incarceration --    Financial Status disabled --    Highest Level of Education unfinished college --    Does patient have access to a firearm? No --     Service No --    Primary Leisure Activity -- --    Spirituality actively participates in organized [employer] --        Past Medical History:   Diagnosis Date    Behavioral problem     Diabetes mellitus     History of psychiatric care     History of psychiatric hospitalization     Psychiatric problem     Retinopathy due to secondary diabetes     Schizophrenia      Past Surgical History:   Procedure Laterality Date     SECTION      COLONOSCOPY N/A 2020    Procedure: COLONOSCOPY;  Surgeon: Edvin Zuniga II, MD;  [facility];  Service: Endoscopy;  Laterality: N/A;    HYSTERECTOMY       Family History     Problem Relation (Age of Onset)    Arthritis Mother    Breast cancer Maternal Aunt (80)    Diabetes type II Father         Tobacco Use    Smoking status: Never Smoker    Smokeless tobacco: Never Used   Substance and Sexual Activity    Alcohol use: No    Drug use: No    Sexual activity: Never     Review of patient's allergies indicates:   Allergen Reactions    Ace inhibitors      Cough       No current facility-administered medications on file prior to encounter.      Current Outpatient Medications on File Prior to Encounter   Medication Sig    ergocalciferol (ERGOCALCIFEROL) 50,000 unit Cap Vitamin D2 1,250 mcg (50,000 unit) capsule   Take 1 capsule every week by oral route.    glimepiride (AMARYL) 4 MG tablet Take 1 tablet (4 mg total) by mouth before breakfast.    levocetirizine (XYZAL) 5 MG tablet Take 1 tablet (5 mg total) by mouth every evening.    losartan (COZAAR) 25 MG tablet Take 1 tablet (25 mg total) by mouth once daily.    metFORMIN (GLUCOPHAGE) 1000 MG tablet TAKE ONE TABLET BY MOUTH TWICE DAILY WITH MEALS    pravastatin (PRAVACHOL) 40 MG tablet Take 1 tablet (40 mg total) by mouth once daily.    blood sugar diagnostic Strp Check BS once a day.    blood-glucose meter kit Check BS once a day.    ibuprofen (ADVIL,MOTRIN) 800 MG tablet     INVEGA SUSTENNA 156 mg/mL Syrg injection     lancets (LANCETS,ULTRA THIN) Misc Check BS once a day.    methylPREDNISolone (MEDROL DOSEPACK) 4 mg tablet use as directed     Psychotherapeutics (From admission, onward)    None        Review of Systems   Constitutional: Negative for activity change and fatigue.   Respiratory: Positive for cough and shortness of breath.    Musculoskeletal: Positive for myalgias.   Psychiatric/Behavioral: Negative for decreased concentration, dysphoric mood, hallucinations, sleep disturbance and suicidal ideas. The patient is not nervous/anxious.      Strengths and Liabilities: Liability: Patient has poor health., Liability: Patient lacks coping skills.    Objective:     Vital Signs (Most Recent):  Temp: 97.6 °F (36.4 °C) (04/20/20  "1100)  Pulse: 92 (04/20/20 1100)  Resp: (!) 22 (04/20/20 1100)  BP: 117/72 (04/20/20 1100)  SpO2: 95 % (04/20/20 1100) Vital Signs (24h Range):  Temp:  [97.6 °F (36.4 °C)-98.5 °F (36.9 °C)] 97.6 °F (36.4 °C)  Pulse:  [85-93] 92  Resp:  [22-30] 22  SpO2:  [94 %-98 %] 95 %  BP: (117-145)/(63-95) 117/72     Height: 5' 4" (162.6 cm)  Weight: 136.1 kg (300 lb)  Body mass index is 51.49 kg/m².      Intake/Output Summary (Last 24 hours) at 4/20/2020 1239  Last data filed at 4/19/2020 1600  Gross per 24 hour   Intake 240 ml   Output 900 ml   Net -660 ml       Physical Exam   Psychiatric:   EXAMINATION    CONSTITUTIONAL  General Appearance:  Hospital attire, talking on the phone    MUSCULOSKELETAL  Muscle Strength and Tone:  Normal  Abnormal Involuntary Movements:  None noted  Gait and Station:  Not observed    PSYCHIATRIC MENTAL STATUS EXAM   Level of Consciousness:  Awake and alert  Orientation:  Name, place, date  Grooming:  Limited to hospital setting  Psychomotor Behavior:  Normal  Speech:  Soft volume, not pressured  Language:  No abnormalities  Mood:  Good  Affect:  Blunted, somewhat restricted  Thought Process:  Blanchard, simple  Associations:  Intact  Thought Content:  Denies suicidal/homicidal/psychosis  Memory:  Intact to recent and remote but some difficulty with details such as medication dosage  Attention:  Distracted  Fund of Knowledge:  Simple conversation  Insight:  Fair into mental illness and need for care  Judgment:  Fair into cooperation with care          Significant Labs:   Last 24 Hours:   Recent Lab Results       04/20/20  1133   04/20/20  0804   04/20/20  0624   04/19/20  2041   04/19/20  1613        Baso #     0.03         Basophil%     0.4         CRP     15.9         Differential Method     Automated         Eos #     0.4         Eosinophil%     5.5         Gran # (ANC)     3.3         Gran%     49.6         Hematocrit     39.5         Hemoglobin     11.7         Immature Grans (Abs)     " 0.22  Comment:  Mild elevation in immature granulocytes is non specific and   can be seen in a variety of conditions including stress response,   acute inflammation, trauma and pregnancy. Correlation with other   laboratory and clinical findings is essential.           Immature Granulocytes     3.3         Lymph #     2.0         Lymph%     29.1         MCH     30.1         MCHC     29.6         MCV     102         Mono #     0.8         Mono%     12.1         MPV     9.6         nRBC     0         Platelets     263         POCT Glucose 218 160   251 225     RBC     3.89         RDW     16.0         WBC     6.70                            All pertinent labs within the past 24 hours have been reviewed.    Significant Imaging: I have reviewed all pertinent imaging results/findings within the past 24 hours.

## 2020-04-20 NOTE — PLAN OF CARE
Problem: Physical Therapy Goal  Goal: Physical Therapy Goal  Description  Goals to be met by:      Patient will increase functional independence with mobility by performin. Gait  x 100 feet with Modified Fisher with stable O2 sats.   2. Stand for 5 minutes with Fisher for ADLs at sink  3. Lower extremity exercise program x20 reps per handout, with supervision     Outcome: Ongoing, Progressing    Home. No PT or DME needs.  F/u tomorrow; consider d/c PT if stable.   Ambulating on 4LO2 NC in room with stable sats

## 2020-04-21 VITALS
OXYGEN SATURATION: 94 % | BODY MASS INDEX: 50.02 KG/M2 | WEIGHT: 293 LBS | TEMPERATURE: 99 F | SYSTOLIC BLOOD PRESSURE: 105 MMHG | HEIGHT: 64 IN | DIASTOLIC BLOOD PRESSURE: 62 MMHG | HEART RATE: 98 BPM | RESPIRATION RATE: 20 BRPM

## 2020-04-21 DIAGNOSIS — U07.1 COVID-19 VIRUS DETECTED: ICD-10-CM

## 2020-04-21 LAB
BASOPHILS # BLD AUTO: 0.03 K/UL (ref 0–0.2)
BASOPHILS NFR BLD: 0.4 % (ref 0–1.9)
DIFFERENTIAL METHOD: ABNORMAL
EOSINOPHIL # BLD AUTO: 0.5 K/UL (ref 0–0.5)
EOSINOPHIL NFR BLD: 5.7 % (ref 0–8)
ERYTHROCYTE [DISTWIDTH] IN BLOOD BY AUTOMATED COUNT: 15.4 % (ref 11.5–14.5)
HCT VFR BLD AUTO: 35.6 % (ref 37–48.5)
HGB BLD-MCNC: 10.9 G/DL (ref 12–16)
IMM GRANULOCYTES # BLD AUTO: 0.14 K/UL (ref 0–0.04)
IMM GRANULOCYTES NFR BLD AUTO: 1.8 % (ref 0–0.5)
LYMPHOCYTES # BLD AUTO: 2.5 K/UL (ref 1–4.8)
LYMPHOCYTES NFR BLD: 32.1 % (ref 18–48)
MCH RBC QN AUTO: 30.2 PG (ref 27–31)
MCHC RBC AUTO-ENTMCNC: 30.6 G/DL (ref 32–36)
MCV RBC AUTO: 99 FL (ref 82–98)
MONOCYTES # BLD AUTO: 0.8 K/UL (ref 0.3–1)
MONOCYTES NFR BLD: 9.9 % (ref 4–15)
NEUTROPHILS # BLD AUTO: 3.9 K/UL (ref 1.8–7.7)
NEUTROPHILS NFR BLD: 50.1 % (ref 38–73)
NRBC BLD-RTO: 0 /100 WBC
PLATELET # BLD AUTO: 334 K/UL (ref 150–350)
PMV BLD AUTO: 10.1 FL (ref 9.2–12.9)
POCT GLUCOSE: 135 MG/DL (ref 70–110)
POCT GLUCOSE: 158 MG/DL (ref 70–110)
POCT GLUCOSE: 162 MG/DL (ref 70–110)
RBC # BLD AUTO: 3.61 M/UL (ref 4–5.4)
WBC # BLD AUTO: 7.87 K/UL (ref 3.9–12.7)

## 2020-04-21 PROCEDURE — 85025 COMPLETE CBC W/AUTO DIFF WBC: CPT

## 2020-04-21 PROCEDURE — 25000003 PHARM REV CODE 250: Performed by: INTERNAL MEDICINE

## 2020-04-21 RX ORDER — LOSARTAN POTASSIUM 25 MG/1
25 TABLET ORAL DAILY
Qty: 90 TABLET | Refills: 1 | Status: SHIPPED | OUTPATIENT
Start: 2020-04-21 | End: 2021-07-01 | Stop reason: SDUPTHER

## 2020-04-21 RX ORDER — ACETAMINOPHEN 325 MG/1
650 TABLET ORAL EVERY 8 HOURS PRN
Qty: 30 TABLET | Refills: 0
Start: 2020-04-21 | End: 2020-12-07

## 2020-04-21 RX ADMIN — FAMOTIDINE 20 MG: 20 TABLET ORAL at 08:04

## 2020-04-21 RX ADMIN — INSULIN ASPART 8 UNITS: 100 INJECTION, SOLUTION INTRAVENOUS; SUBCUTANEOUS at 07:04

## 2020-04-21 RX ADMIN — DOCUSATE SODIUM 100 MG: 100 CAPSULE, LIQUID FILLED ORAL at 08:04

## 2020-04-21 RX ADMIN — INSULIN ASPART 8 UNITS: 100 INJECTION, SOLUTION INTRAVENOUS; SUBCUTANEOUS at 12:04

## 2020-04-21 NOTE — PHYSICIAN QUERY
PT Name: Ava Driscoll  MR #: [id number]    Physician Query Form - Emergency Medicine Diagnosis Clarification     CDS/: Anusha Baum RN CCDS            Contact information:[email or web address]  This form is a permanent document in the medical record.     Query Date: April 21, 2020    By submitting this query, we are merely seeking further clarification of documentation.  Please utilize your independent clinical judgment when addressing the question(s) below.      The Medical record contains the following:     Diagnosis Supporting Clinical Information Location in Medical Record       [facility]   Chemistry with [facility].     4/16/2020 11:31 4/16/2020 18:57 4/17/2020 04:35 4/18/2020 03:20 4/19/2020 04:56   BUN, Bld 63 (H) 55 (H) 46 (H) 32 (H) 22 (H)   Creatinine 2.3 (H) 1.7 (H) 1.5 (H) 1.2 1.0   eGFR if African American 28 (A) 40 (A) 47 (A) >60 >60     Lactic acidosis   2/2 dehydration   Resolved with fluids      ER provider note    Lab 4/16-4/19                                      Discharge summary     Do you agree with the Emergency Medicine diagnosis of _______Acute kidney injury_____________?    [ x  ] Yes   [   ] Yes, but it resolved prior to my assessment of the patient   [   ] No   [   ] Other/Clarification of Findings:   [  ] Clinically Undetermined         Please document in your progress notes daily for the duration of treatment until resolved and include in your discharge summary.

## 2020-04-21 NOTE — ASSESSMENT & PLAN NOTE
- COVID-19 testing   - Infection Control notified     - Isolation:   - Airborne, Contact and Droplet Precautions  - Cohort patients into COVID units  - N95 masks must be fit tested, wear eye protection  - 20 second hand hygiene              - Limit visitors per hospital policy              - Consolidating lab draws, nursing care, provider visits, and interventions    - Diagnostics: (leukopenia, hyponatremia, hyperferritinemia, elevated troponin, elevated d-dimer, age, and comorbidities are significant predictors of poor clinical outcome)    - Management:  Supplemental O2 to maintain SpO2 >92%; discharge with supportive O2 of 2 liters NC; instructed to remain quarantined for total of 2 weeks from date of admit) and to wear a mask at all times if around others.   Intermittent Pulse Ox      Advance Care Planning   Full

## 2020-04-21 NOTE — PLAN OF CARE
"EDUCATION: patient provided with educational information on Covid precaution and oxygen safety in the home.  Information reviewed via telephone and written instructions to be provided to patient upon dishcarrge nformation included:  signs and symptoms to look for and call the doctor if experiencing, and symptoms that may indicate a medical emergency: CALL 911.      All questions answered.  Teach back method used.    Patient stated, " I will call 911 for fever or difficulty breathing  ".    Reviewed with patient things that she can do to better manage her care at home to include taking all medications as precscribed and make sure patient attend all follow up visits;     F/U appointments were reviewed;  verbalized understanding    Patient ordered to discharge home with oxygen; pt has [address] listed as address; Marlee Triplett provided with patient's physical address:[phone]; [place] [phone] for home delivery      Nurse, Elba notified that all discharge planning needs have been addressed and patient can be discharged from  standpoint     04/21/20 1401   Final Note   Assessment Type Final Discharge Note   Anticipated Discharge Disposition Home   What phone number can be called within the next 1-3 days to see how you are doing after discharge? [phone]   Hospital Follow Up  Appt(s) scheduled? Yes   Discharge plans and expectations educations in teach back method with documentation complete? Yes   Right Care Referral Info   Post Acute Recommendation No Care   Post-Acute Status   Post-Acute Authorization [facility] Status Set-up Complete  (Home O2; Covid +)   Discharge Delays None known at this time         "

## 2020-04-21 NOTE — PLAN OF CARE
IMM and Medicare appeal process reviewed with patient via telephone; verbalized understanding; copy to be placed in chart and given to patient before discharge       04/21/20 5787   Medicare Message   Important Message from Medicare regarding Discharge Appeal Rights Given to patient/caregiver;Explained to patient/caregiver;Signed/date by patient/caregiver   Date IMM was signed 04/21/20   Time IMM was signed [id number]

## 2020-04-21 NOTE — NURSING
Spoke w/ patient's sister. States getting house in order and will be able to  patient within 30-60min. Patient updated. Will continue to monitor.

## 2020-04-21 NOTE — ASSESSMENT & PLAN NOTE
No acute issues; has not challenged this admission.   Stable   Resume her home regimen of Invega; notations made per Inpatient Psychiatrist from yesterday evening; patient ot follow up with her est'd Psychiatrist at [facility] at discharge.

## 2020-04-21 NOTE — DISCHARGE INSTRUCTIONS
1) MONITOR BLOOD PRESSURE AT HOME. HOLD THE LOSARTAN UNTIL YOU SEE AND SPEAK WITH YOUR PRIMARY CARE PROVIDER, DR. SALDANA.     2) CALL DR. SALDANA' OFFICE IF UNABLE TO MAKE A PHYSICAL VISIT IN 1-2 WEEKS; SHE MAY BE ABLE TO OFFER YOU A VIRTUAL VISIT.     3) CHECK AND MONITOR YOUR BLOOD SUGARS AT HOME. KEEP A LOG AND BRING TO NEXT PRIMARY CARE APPT    4) CALL AND SCHEDULE YOUR FOLLOW UP WITH YOUR ESTABLISHED PSYCHIATRIST.     5) Your test was POSITIVE for COVID-19 (coronavirus).      Stay home and stay away from family members and friends for Total of 14 days from date of admission (till: 4/30/2020)   Separate yourself from other people and animals in your home.   Call ahead before visiting your doctor.   Wear a facemask.   Cover your coughs and sneezes.   Wash your hands often with soap and water; hand  can be used, too.   Avoid sharing personal household items.   Wipe down surfaces used daily.   Monitor your symptoms. Seek prompt medical attention if your illness is worsening (e.g., difficulty breathing).    Before seeking care, call your healthcare provider.        Recommended precautions for household members, intimate partners, and caregivers in a home setting of a patient with confirmed COVID-19:     Close contacts should also follow these recommendations:   Make sure that you understand and can help the patient follow their provider's instructions for medication(s) and care. You should help the patient with basic needs in the home and provide support for getting groceries, prescriptions, and other personal needs.   Monitor the patient's symptoms. If the patient is getting sicker, call his or her healthcare provider and tell them that the patient has laboratory-confirmed COVID-19.    Household members should stay in another room or be  from the patient. Household members should use a separate bedroom and bathroom, if available.   Prohibit visitors.   Household members should care for  any pets in the home.   Make sure that shared spaces in the home have good air flow, such as by an air conditioner or an opened window, weather permitting.   Perform hand hygiene frequently. Wash your hands often with soap and water for at least 20 seconds or use an alcohol-based hand  (that contains > 60% alcohol) covering all surfaces of your hands and rubbing them together until they feel dry. Soap and water should be used preferentially.   Avoid touching your eyes, nose, and mouth.   The patient should wear a facemask. If the patient is not able to wear a facemask (for example, because it causes trouble breathing), caregivers should wear a mask when they are in the same room as the patient.   Wear a disposable facemask and gloves when you touch or have contact with the patient's blood, stool, or body fluids, such as saliva, sputum, nasal mucus, vomit, urine.  o Throw out disposable facemasks and gloves after using them. Do not reuse.  o When removing personal protective equipment, first remove and dispose of gloves. Then, immediately clean your hands with soap and water or alcohol-based hand . Next, remove and dispose of facemask, and immediately clean your hands again with soap and water or alcohol-based hand .   You should not share dishes, drinking glasses, cups, eating utensils, towels, bedding, or other items with the patient. After the patient uses these items, you should wash them thoroughly (see below Wash laundry thoroughly).   Clean all high-touch surfaces, such as counters, tabletops, doorknobs, bathroom fixtures, toilets, phones, keyboards, tablets, and bedside tables, every day. Also, clean any surfaces that may have blood, stool, or body fluids on them.   Use a household cleaning spray or wipe, according to the label instructions. Labels contain instructions for safe and effective use of the cleaning product including precautions you should take when applying  the product, such as wearing gloves and making sure you have good ventilation during use of the product.   Wash laundry thoroughly.  o Immediately remove and wash clothes or bedding that have blood, stool, or body fluids on them.  o Wear disposable gloves while handling soiled items and keep soiled items away from your body. Clean your hands (with soap and water or an alcohol-based hand ) immediately after removing your gloves.  o Read and follow directions on labels of laundry or clothing items and detergent. In general, using a normal laundry detergent according to washing machine instructions and dry thoroughly using the warmest temperatures recommended on the clothing label.   Place all used disposable gloves, facemasks, and other contaminated items in a lined container before disposing of them with other household waste. Clean your hands (with soap and water or an alcohol-based hand ) immediately after handling these items. Soap and water should be used preferentially if hands are visibly dirty.   Discuss any additional questions with your state or local health department or healthcare provider. Check available hours when contacting your local health department.    For more information see CDC link below.    https://www.cdc.gov/coronavirus/2019-ncov/hcp/guidance-prevent-spread.html#precautions        Sources:  [facility] and [facility]      WRITTEN HEALTHCARE DISCHARGE INFORMATION      Things that YOU are responsible for to Manage Your Care At Home:  1. Getting your prescriptions filled.  2. Taking you medications as directed. DO NOT MISS ANY DOSES!  3. Going to your follow-up doctor appointments. This is important because it allows the doctor to monitor your progress and to determine if any changes need to be made to your treatment plan.     If you are unable to make your follow up appointments, please call the number listed and reschedule this appointment.       After discharge, if you need assistance, you can call [facility] On Call Nurse Care Line for 24/7 assistance at [phone]     If you are experience any signs or symptoms, Call your doctor or Call 911 and come to your nearest Emergency Room.     Thank you for choosing [facility] and allowing us to care for you.        You should receive a call from [facility] Discharge Department within 48-72 hours to help manage your care after discharge. Please try to make sure that you answer your phone for this important phone call.

## 2020-04-21 NOTE — NURSING
Patient discharged home via transport in wheelchair. Belongings, discharge packet, and oxygen in hand. Patient in no distress. Oxygen in place.

## 2020-04-21 NOTE — CARE UPDATE
Performed per Nursing today:     94% RA.  87% RA with exertion  96% 3L NC    Arrange home O2    SDJ

## 2020-04-21 NOTE — ASSESSMENT & PLAN NOTE
2/2 ARDS associated to COVID-19  Clinically improved and has remained stable on low flow NC   Completed Ceftriaxone/Azithromycin   *Discharge home with supportive 2 liters NC

## 2020-04-21 NOTE — DISCHARGE SUMMARY
"[facility] Medicine  Discharge Summary      Patient Name: Ava Driscoll  MRN: [id number]  Admission Date: 4/16/2020  Hospital Length of Stay: 5 days  Discharge Date and Time:  04/21/2020 12:10 PM  Attending Physician: Tiago Jackson MD   Discharging Provider: ARMANDO Lozoya  Primary Care Provider: Keerthi Murphy MD      HPI:   49 year old female with diabetes mellitus type 2, obesity, hyperlipidemia, shchizophrenia and hypertension who presented with shortness of breath and cough "for the past couple of days". Feels likes symptoms have worsened overtime. Denied fever, chills, chest pain, abdominal pain, nausea/vomiting or diarrhea. Patient states compliance with her medications, including the ones for diabetes. Is not on insulin.    In the ED, patient was tachypneic and hypoxic to the 70s at room air. Afebrile. Labwork significant for     * No surgery found *      Hospital Course:   Ms Driscoll presented with acute respiratory failure with hypoxia 2/2 COVID-19 infection. Placed on NRB in the ED but quickly de-escalated to low flow NC at 4-5 L. Oxygen saturation increased appropriately but patient easily tachypneic with minimal activity or even by speaking one word at a time. Patient also dehydration and hyperglycemic but not in DKA or in HNKH. Admitted to ICU for close observation given high risk for further decompensation. Given isotonic fluids and initiated on insulin injections. Patient felt better the next day and breathing more comfortably on low flow NC but glucose still very difficult to control. SQ dose increased. HgbA1c very high 12.4% on oral hypoglycemic agents. Would benefit from insulin injections at home. Stepped down on 4/17. Chu catheter removed and patient using the bedside commode. PT/OT eval ordered.  4/21/2020:   Ms. Driscoll was transitioned to the floor 4 days ago. Currently sating 97 % on supportive 2 liters NC of support, which is progressive improvement in " the setting of admission presentation and diagnosis of Acute Respiratory Failure 2/2 novel COVID 19 PCR infectious process. Completed Azithromycin and Zosyn therapy for secondary COVID related pneumonia. On day of discharge, afebrile (98.1), but still requiring 2 liters of support. Will be discharged home with 2 liters of support and PCP (Dr. Murphy) for follow up.  She is chronically uncontrolled with DM II (admit A1c: 12%); subsequently, commenced to IV insulin therapy (Basal 30 daily and Aspart 8/8/8) on admission, with plans for discharge on her routine oral home regimen of Metformin and Amaryl, ADA diet. Recommend OP follow up with Endocrine. Will defer to her PCP. 24 hour POCT Blood Sugar Range prior to discharge: 135-218 mg/dl.   H/o Paranoid Schizophrenia. Not challenged this admission. Seen on consult per Inpatient Psychiatrist due to in-house pharmacy not having her routine home medication, Invega, on formulary. Appreciate the input. She is established with [facility] and will follow up post discharge.   She was seen and evaluated by inpatient PT and OT without any assessed needs at time of discharge.        Consults:   Consults (From admission, onward)        Status Ordering Provider     Inpatient consult to PICC team (NIAS)  Once     Provider:  (Not yet assigned)    Acknowledged MICHELE JEFFERS     Inpatient consult to Psychiatry  Once     Provider:  Dario Alves MD    Completed YANIRA WINTERS     Inpatient consult to Pulmonology  Once     Provider:  Remedios Jain MD    Completed MICHELE JEFFERS          * Acute respiratory failure with hypoxia  2/2 ARDS associated to COVID-19  Clinically improved and has remained stable on low flow NC   Completed Ceftriaxone/Azithromycin   *Discharge home with supportive 2 liters NC    COVID-19 virus infection  - COVID-19 testing   - Infection Control notified     - Isolation:   - Airborne, Contact and Droplet Precautions  - Cohort patients into COVID  units  - N95 masks must be fit tested, wear eye protection  - 20 second hand hygiene              - Limit visitors per hospital policy              - Consolidating lab draws, nursing care, provider visits, and interventions    - Diagnostics: (leukopenia, hyponatremia, hyperferritinemia, elevated troponin, elevated d-dimer, age, and comorbidities are significant predictors of poor clinical outcome)    - Management:  Supplemental O2 to maintain SpO2 >92%; discharge with supportive O2 of 2 liters NC; instructed to remain quarantined for total of 2 weeks from date of admit) and to wear a mask at all times if around others.   Intermittent Pulse Ox      Advance Care Planning   Full    Pneumonia due to COVID-19 virus  Completed Ceftriaxone/Azithromycin         Lactic acidosis  2/2 dehydration  Resolved with fluids    Hyponatremia with extracellular fluid depletion  Resolved: 138 on day of discharge          Type 2 diabetes mellitus with hyperglycemia, without long-term current use of insulin  *A1c: 12% on admit, reflective of chronic uncontrol  24 hour prior to discharge: 135-218 mg/dl   No HNKH or DKA  Commenced to Basal 30 units daily and Aspart 8/8/8 while IP  Discharge on home regiment of Metformin 1 gm bid and Amaryl, with PCP follow up.   Recommend referral to Endocrine OP; defer to her Primary Care Provider.   Instructions given to hold ARB therapy until follow up with PCP; has not been on this agent while IP.   (BPs are not permissible; 105/62 on day of discharge).  Continue Pravachol.       Paranoid schizophrenia  No acute issues; has not challenged this admission.   Stable   Resume her home regimen of Invega; notations made per Inpatient Psychiatrist from yesterday evening; patient ot follow up with her est'd Psychiatrist at [facility] at discharge.     Discharge planning issues  Home with her brother, and supportive 2 liters NC    Final Active Diagnoses:    Diagnosis Date Noted POA    PRINCIPAL PROBLEM:  Acute  respiratory failure with hypoxia [J96.01] 04/16/2020 Yes    Discharge planning issues [Z02.9] 04/20/2020 Not Applicable    Pneumonia due to COVID-19 virus [U07.1, J12.89] 04/16/2020 Yes    COVID-19 virus infection [U07.1] 04/16/2020 Yes    Hyponatremia with extracellular fluid depletion [E87.1] 04/16/2020 Yes    Lactic acidosis [E87.2] 04/16/2020 Yes    Type 2 diabetes mellitus with hyperglycemia, without long-term current use of insulin [E11.65] 03/20/2019 Yes    BMI 50.0-59.9, adult [Z68.43] 07/17/2018 Not Applicable    Paranoid schizophrenia [F20.0] 06/19/2014 Yes      Problems Resolved During this Admission:       Discharged Condition: stable    Disposition:     Follow Up:  Follow-up Information     Keerthi Murphy MD In 1 week.    Specialties:  Internal Medicine, Wound Care  Why:  HOSPITAL FOLLOW UP; BLOOD PRESSURE CHECK  Contact information:  [address] [place] [address]  [address]  [place] [id number] [phone]                 Patient Instructions:   No discharge procedures on file.    Significant Diagnostic Studies: Labs:   BMP: No results for input(s): GLU, NA, K, CL, CO2, BUN, CREATININE, CALCIUM, MG in the last 48 hours., CMP No results for input(s): NA, K, CL, CO2, GLU, BUN, CREATININE, CALCIUM, PROT, ALBUMIN, BILITOT, ALKPHOS, AST, ALT, ANIONGAP, ESTGFRAFRICA, EGFRNONAA in the last 48 hours., CBC   Recent Labs   Lab 04/20/20  0624 04/21/20  0448   WBC 6.70 7.87   HGB 11.7* 10.9*   HCT 39.5 35.6*    334   , Lipid Panel   Lab Results   Component Value Date    CHOL 138 10/23/2019    HDL 34 (L) 10/23/2019    LDLCALC 84.2 10/23/2019    TRIG 99 10/23/2019    CHOLHDL 24.6 10/23/2019    and A1C:   Recent Labs   Lab 04/17/20  0435   HGBA1C 12.4*         Pending Diagnostic Studies:     None         Medications:  Reconciled Home Medications:      Medication List      START taking these medications    acetaminophen 325 MG tablet  Commonly known as:  TYLENOL  Take 2 tablets (650 mg total) by mouth every 8  (eight) hours as needed.        CHANGE how you take these medications    losartan 25 MG tablet  Commonly known as:  COZAAR  Take 1 tablet (25 mg total) by mouth once daily. HOLD UNTIL SEEN BY PRIMARY CARE PROVIDER.  What changed:  additional instructions        CONTINUE taking these medications    blood sugar diagnostic Strp  Check BS once a day.     blood-glucose meter kit  Check BS once a day.     ergocalciferol 50,000 unit Cap  Commonly known as:  ERGOCALCIFEROL  Vitamin D2 1,250 mcg (50,000 unit) capsule   Take 1 capsule every week by oral route.     glimepiride 4 MG tablet  Commonly known as:  AMARYL  Take 1 tablet (4 mg total) by mouth before breakfast.     INVEGA SUSTENNA 156 mg/mL Syrg injection  Generic drug:  paliperidone palmitate     lancets Misc  Commonly known as:  LANCETS,ULTRA THIN  Check BS once a day.     levocetirizine 5 MG tablet  Commonly known as:  XYZAL  Take 1 tablet (5 mg total) by mouth every evening.     metFORMIN 1000 MG tablet  Commonly known as:  GLUCOPHAGE  TAKE ONE TABLET BY MOUTH TWICE DAILY WITH MEALS     pravastatin 40 MG tablet  Commonly known as:  PRAVACHOL  Take 1 tablet (40 mg total) by mouth once daily.        STOP taking these medications    ibuprofen 800 MG tablet  Commonly known as:  ADVIL,MOTRIN     methylPREDNISolone 4 mg tablet  Commonly known as:  MEDROL DOSEPACK            Indwelling Lines/Drains at time of discharge:   Lines/Drains/Airways     None                 Time spent on the discharge of patient: 60 minutes  Patient was seen and examined on the date of discharge and determined to be suitable for discharge.         ARMANDO Lozoya  Department of Hospital Medicine  [facility]

## 2020-04-21 NOTE — PLAN OF CARE
Problem: Adult Inpatient Plan of Care  Goal: Plan of Care Review  Outcome: Ongoing, Progressing     Problem: Diabetes Comorbidity  Goal: Blood Glucose Level Within Desired Range  Outcome: Ongoing, Progressing     Problem: Fall Injury Risk  Goal: Absence of Fall and Fall-Related Injury  Outcome: Ongoing, Progressing     Problem: Infection  Goal: Infection Symptom Resolution  Outcome: Ongoing, Progressing

## 2020-04-21 NOTE — ASSESSMENT & PLAN NOTE
*A1c: 12% on admit, reflective of chronic uncontrol  24 hour prior to discharge: 135-218 mg/dl   No HNKH or DKA  Commenced to Basal 30 units daily and Aspart 8/8/8 while IP  Discharge on home regiment of Metformin 1 gm bid and Amaryl, with PCP follow up.   Recommend referral to Endocrine OP; defer to her Primary Care Provider.   Instructions given to hold ARB therapy until follow up with PCP; has not been on this agent while IP.   (BPs are not permissible; 105/62 on day of discharge).  Continue Pravachol.

## 2020-04-21 NOTE — NURSING
Patient asleep but arousable. No signs of distress. 02 in place via NC @ 3L. No SOB verbalized. Assessment done. Insulin and AM meds given as ordered. Plan of care reviewed. All questions answered. Safety measures explained and in place. Call light in reach. Will continue to monitor.

## 2020-04-21 NOTE — NURSING
Patient educated on discharge instructions. Reviewed medications, RX changes, follow up appointments, oxygen use and indications. Patient verbalized understanding. 02 in place via NC and tank at bedside. Peripheral IV removed x 1. Tip intact. Tele box removed. Patient states sister will  and will alert when arrives. All questions answered. Will continue to monitor.

## 2020-04-22 ENCOUNTER — PATIENT OUTREACH (OUTPATIENT)
Dept: ADMINISTRATIVE | Facility: CLINIC | Age: 50
End: 2020-04-22

## 2020-04-22 DIAGNOSIS — U07.1 PNEUMONIA DUE TO COVID-19 VIRUS: ICD-10-CM

## 2020-04-22 DIAGNOSIS — J12.82 PNEUMONIA DUE TO COVID-19 VIRUS: ICD-10-CM

## 2020-04-22 DIAGNOSIS — U07.1 COVID-19 VIRUS INFECTION: Primary | ICD-10-CM

## 2020-04-22 DIAGNOSIS — E11.65 TYPE 2 DIABETES MELLITUS WITH HYPERGLYCEMIA, WITHOUT LONG-TERM CURRENT USE OF INSULIN: ICD-10-CM

## 2020-04-22 NOTE — PROGRESS NOTES
C3 nurse attempted to contact patient. No answer.  C3 nurse attempted to contact Ava Driscoll for a TCC post hospital discharge follow up call. No answer at phone number listed and no voicemail available. The patient has a HOSFU appointment with Keerthi Murphy MD on 4/28 @ 1000. Message sent to Physician staff.

## 2020-04-23 NOTE — TELEPHONE ENCOUNTER
C3 nurse attempted to contact patient. No answer. The following message was left for the patient to return the call:  Good afternoon, my name is Mari and I am a registered nurse calling on behalf of [facility] post discharge unit.  This is a Transitional Care call for Ava Driscoll.  When you have a moment please contact me at [phone] between 8 and 4, Monday through Friday. If you have questions or issues, a nurse is available 24 hours every day at our ON CALL # thats [phone]. On behalf of [facility], thank you, and have a nice day.    The patient has a scheduled HOSFU appointment with Keerthi Murphy MD on 4/28/2020 @ 1000.

## 2020-04-23 NOTE — PATIENT INSTRUCTIONS
Prevention steps for patients with confirmed COVID-19       Stay home and stay away from family members and friends. The CDC says, you can leave home after these three things have happened: 1) You have had no fever for at least 72 hours (that is three full days of no fever without the use of medicine that reduces fevers) 2) AND other symptoms have improved (for example, when your cough or shortness of breath have improved) 3) AND at least 7 days have passed since your symptoms first appeared.   Separate yourself from other people and animals in your home.   Call ahead before visiting your doctor.   Wear a facemask.   Cover your coughs and sneezes.   Wash your hands often with soap and water; hand  can be used, too.   Avoid sharing personal household items.   Wipe down surfaces used daily.   Monitor your symptoms. Seek prompt medical attention if your illness is worsening (e.g., difficulty breathing).    Before seeking care, call your healthcare provider.   If you have a medical emergency and need to call 911, notify the dispatch personnel that you have, or are being evaluated for COVID-19. If possible, put on a facemask before emergency medical services arrive.        Recommended precautions for household members, intimate partners, and caregivers in a home setting of a patient with symptomatic laboratory-confirmed COVID-19 or a patient under investigation.  Household members, intimate partners, and caregivers in the home setting awaiting tests results have close contact with a person with symptomatic, laboratory-confirmed COVID-19 or a person under investigation. Close contacts should monitor their health; they should call their provider right away if they develop symptoms suggestive of COVID-19 (e.g., fever, cough, shortness of breath).    Close contacts should also follow these recommendations:   Make sure that you understand and can help the patient follow their provider's instructions for  medication(s) and care. You should help the patient with basic needs in the home and provide support for getting groceries, prescriptions, and other personal needs.   Monitor the patient's symptoms. If the patient is getting sicker, call his or her healthcare provider and tell them that the patient has laboratory-confirmed COVID-19. If the patient has a medical emergency and you need to call 911, notify the dispatch personnel that the patient has, or is being evaluated for COVID-19.   Household members should stay in another room or be  from the patient. Household members should use a separate bedroom and bathroom, if available.   Prohibit visitors.   Household members should care for any pets in the home.   Make sure that shared spaces in the home have good air flow, such as by an air conditioner or an opened window, weather permitting.   Perform hand hygiene frequently. Wash your hands often with soap and water for at least 20 seconds or use an alcohol-based hand  (that contains > 60% alcohol) covering all surfaces of your hands and rubbing them together until they feel dry. Soap and water should be used preferentially.   Avoid touching your eyes, nose, and mouth.   The patient should wear a facemask. If the patient is not able to wear a facemask (for example, because it causes trouble breathing), caregivers should wear a mask when they are in the same room as the patient.   Wear a disposable facemask and gloves when you touch or have contact with the patient's blood, stool, or body fluids, such as saliva, sputum, nasal mucus, vomit, urine.  o Throw out disposable facemasks and gloves after using them. Do not reuse.  o When removing personal protective equipment, first remove and dispose of gloves. Then, immediately clean your hands with soap and water or alcohol-based hand . Next, remove and dispose of facemask, and immediately clean your hands again with soap and water or  alcohol-based hand .   You should not share dishes, drinking glasses, cups, eating utensils, towels, bedding, or other items with the patient. After the patient uses these items, you should wash them thoroughly (see below Wash laundry thoroughly).   Clean all high-touch surfaces, such as counters, tabletops, doorknobs, bathroom fixtures, toilets, phones, keyboards, tablets, and bedside tables, every day. Also, clean any surfaces that may have blood, stool, or body fluids on them.   Use a household cleaning spray or wipe, according to the label instructions. Labels contain instructions for safe and effective use of the cleaning product including precautions you should take when applying the product, such as wearing gloves and making sure you have good ventilation during use of the product.   Wash laundry thoroughly.  o Immediately remove and wash clothes or bedding that have blood, stool, or body fluids on them.  o Wear disposable gloves while handling soiled items and keep soiled items away from your body. Clean your hands (with soap and water or an alcohol-based hand ) immediately after removing your gloves.  o Read and follow directions on labels of laundry or clothing items and detergent. In general, using a normal laundry detergent according to washing machine instructions and dry thoroughly using the warmest temperatures recommended on the clothing label.   Place all used disposable gloves, facemasks, and other contaminated items in a lined container before disposing of them with other household waste. Clean your hands (with soap and water or an alcohol-based hand ) immediately after handling these items. Soap and water should be used preferentially if hands are visibly dirty.   Discuss any additional questions with your state or local health department or healthcare provider. Check available hours when contacting your local health department.    For more information see CDC  link below.      https://www.cdc.gov/coronavirus/2019-ncov/hcp/guidance-prevent-spread.html#precautions        Sources:  [facility], [place] [facility] and [facility]

## 2020-04-28 ENCOUNTER — OFFICE VISIT (OUTPATIENT)
Dept: FAMILY MEDICINE | Facility: CLINIC | Age: 50
End: 2020-04-28
Payer: MEDICARE

## 2020-04-28 DIAGNOSIS — N25.81 SECONDARY HYPERPARATHYROIDISM: ICD-10-CM

## 2020-04-28 DIAGNOSIS — E11.65 TYPE 2 DIABETES MELLITUS WITH HYPERGLYCEMIA, WITHOUT LONG-TERM CURRENT USE OF INSULIN: ICD-10-CM

## 2020-04-28 DIAGNOSIS — U07.1 COVID-19 VIRUS DETECTED: Primary | ICD-10-CM

## 2020-04-28 LAB — POCT GLUCOSE: 371 MG/DL (ref 70–110)

## 2020-04-28 PROCEDURE — 99441 PR PHYSICIAN TELEPHONE EVALUATION 5-10 MIN: CPT | Mod: 95,,, | Performed by: INTERNAL MEDICINE

## 2020-04-28 PROCEDURE — 99441 PR PHYSICIAN TELEPHONE EVALUATION 5-10 MIN: ICD-10-PCS | Mod: 95,,, | Performed by: INTERNAL MEDICINE

## 2020-04-28 RX ORDER — LANCETS
EACH MISCELLANEOUS
Qty: 200 EACH | Refills: 3 | Status: SHIPPED | OUTPATIENT
Start: 2020-04-28 | End: 2021-07-01 | Stop reason: SDUPTHER

## 2020-04-28 RX ORDER — INSULIN PUMP SYRINGE, 3 ML
EACH MISCELLANEOUS
Qty: 1 EACH | Refills: 0 | Status: SHIPPED | OUTPATIENT
Start: 2020-04-28 | End: 2021-07-01 | Stop reason: SDUPTHER

## 2020-04-28 RX ORDER — GLIMEPIRIDE 4 MG/1
4 TABLET ORAL
Qty: 90 TABLET | Refills: 0 | Status: SHIPPED | OUTPATIENT
Start: 2020-04-28 | End: 2020-12-07

## 2020-04-28 NOTE — PROGRESS NOTES
Established Patient - Audio Only Telehealth Visit     The patient location is: home  The chief complaint leading to consultation is: f/u after hospital discharge  Visit type: Virtual visit with audio only (telephone)     The reason for the audio only service rather than synchronous audio and video virtual visit was related to technical difficulties or patient preference/necessity.     Each patient to whom I provide medical services by telemedicine is:  (1) informed of the relationship between the physician and patient and the respective role of any other health care provider with respect to management of the patient; and (2) notified that they may decline to receive medical services by telemedicine and may withdraw from such care at any time. Patient verbally consented to receive this service via voice-only telephone call.       HPI: Pt has been doing fine since discharge. She completed her course of Abx while at the hospital. She still has a dry cough. Denies any SOB, chest tightness, wheezing, fever, chills, or night sweats. Pt does not have a pulse oximeter, so has been wearing her home oxygen daily. Pt reports she has also not been able to check her blood glucose levels 2/2 lack of strips and lancets. She is unable to tell me what type of glucometer she has at home. Pt has been eating the food in the home, but is not necessarily following an ADA diet. She does report full compliance with meds and denies any adverse side effects.     Assessment and plan:  48 yo F with    1. COVID-19 virus detected  - Improved  - Pt still requiring home O2, but has no means of checking her oxygen saturation; encouraged to purchase a home pulse oximeter  - Pt denies any further SOB/ROMERO; recommend pt remove home O2 for 5 minutes daily to assess for any SOB and gradually increase time off oxygen by 5 minute increments to begin weaning off oxygen as tolerated; pt voiced understanding, but aware to place O2 back on if she gets SOB   -  Independently reviewed labs/imaging from recent hospitalization    2. Type 2 diabetes mellitus with hyperglycemia, without long-term current use of insulin  - Pt non-compliant with ADA diet and can not give names of meds; will ensure she has refills on Glimepiride and add Jardiance given recent HgbA1C of 12.4  - blood-glucose meter kit; Check BS once a day.  Dispense: 1 each; Refill: 0  - blood sugar diagnostic Strp; Check BS once a day.  Dispense: 200 each; Refill: 3  - lancets (LANCETS,ULTRA THIN) Misc; Check BS once a day.  Dispense: 200 each; Refill: 3  - glimepiride (AMARYL) 4 MG tablet; Take 1 tablet (4 mg total) by mouth before breakfast.  Dispense: 90 tablet; Refill: 0  - empagliflozin (JARDIANCE) 25 mg Tab; Take 25 mg by mouth once daily.  Dispense: 90 tablet; Refill: 0    3. Secondary hyperparathyroidism  - Stable; no acute issues  - Monitor    4. BMI 50.0-59.9, adult  - Will readdress importance of pt eating a prudent diet and exercising      RTC in 4 weeks             This service was not originating from a related E/M service provided within the previous 7 days nor will  to an E/M service or procedure within the next 24 hours or my soonest available appointment.  Prevailing standard of care was able to be met in this audio-only visit.

## 2020-04-29 LAB — POCT GLUCOSE: >500 MG/DL (ref 70–110)

## 2020-05-04 ENCOUNTER — OFFICE VISIT (OUTPATIENT)
Dept: HOME HEALTH SERVICES | Facility: CLINIC | Age: 50
End: 2020-05-04
Payer: MEDICARE

## 2020-05-04 VITALS — WEIGHT: 293 LBS | HEIGHT: 64 IN | BODY MASS INDEX: 50.02 KG/M2

## 2020-05-04 DIAGNOSIS — E11.65 TYPE 2 DIABETES MELLITUS WITH HYPERGLYCEMIA, WITHOUT LONG-TERM CURRENT USE OF INSULIN: ICD-10-CM

## 2020-05-04 DIAGNOSIS — U07.1 COVID-19 VIRUS DETECTED: Primary | ICD-10-CM

## 2020-05-04 DIAGNOSIS — F20.0 PARANOID SCHIZOPHRENIA: ICD-10-CM

## 2020-05-04 PROCEDURE — 99443 PR PHYSICIAN TELEPHONE EVALUATION 21-30 MIN: CPT | Mod: 95,,, | Performed by: NURSE PRACTITIONER

## 2020-05-04 PROCEDURE — 99443 PR PHYSICIAN TELEPHONE EVALUATION 21-30 MIN: ICD-10-PCS | Mod: 95,,, | Performed by: NURSE PRACTITIONER

## 2020-05-04 NOTE — PROGRESS NOTES
Established Patient - Audio Only Telehealth Visit     The patient location is:  Home  The chief complaint leading to consultation is:COVID19  Visit type: Virtual visit with audio only (telephone)  Total time spent with patient: 30       The reason for the audio only service rather than synchronous audio and video virtual visit was related to technical difficulties or patient preference/necessity.     Each patient to whom I provide medical services by telemedicine is:  (1) informed of the relationship between the physician and patient and the respective role of any other health care provider with respect to management of the patient; and (2) notified that they may decline to receive medical services by telemedicine and may withdraw from such care at any time. Patient verbally consented to receive this service via voice-only telephone call.         [facility]  Transition of Care Home Visit    Visit Date: 5/4/2020  Encounter Provider: Eulalia Hernández NP  PCP:  Keerthi Murphy MD    PRESENTING HISTORY      Patient ID: Ava Driscoll is a 49 y.o. female.    Consult Requested By:  No ref. provider found  Reason for Consult:  Hospital Follow up    Ava is being seen at home due to  transportation limitations, physical debility presents taxing effort to leave the home and to mitigate high risk of hospital readmission.  .      Chief Complaint: Transitional Care, COVID19    History of Present Illness: Ms. Ava Driscoll is a 49 y.o. female who was recently admitted to [facility].    Admission Date: 4/16/2020  Hospital Length of Stay: 5 days  Discharge Date and Time:  04/21/2020     Hospital Course:   Ms Driscoll presented with acute respiratory failure with hypoxia 2/2 COVID-19 infection. Placed on NRB in the ED but quickly de-escalated to low flow NC at 4-5 L. Oxygen saturation increased appropriately but patient easily tachypneic with minimal activity or even by speaking one word at a time. Patient also  dehydration and hyperglycemic but not in DKA or in HNKH. Admitted to ICU for close observation given high risk for further decompensation. Given isotonic fluids and initiated on insulin injections. Patient felt better the next day and breathing more comfortably on low flow NC but glucose still very difficult to control. SQ dose increased. HgbA1c very high 12.4% on oral hypoglycemic agents. Would benefit from insulin injections at home. Stepped down on 4/17. Chu catheter removed and patient using the bedside commode. PT/OT eval ordered.  4/21/2020:   Ms. Driscoll was transitioned to the floor 4 days ago. Currently sating 97 % on supportive 2 liters NC of support, which is progressive improvement in the setting of admission presentation and diagnosis of Acute Respiratory Failure 2/2 novel COVID 19 PCR infectious process. Completed Azithromycin and Zosyn therapy for secondary COVID related pneumonia. On day of discharge, afebrile (98.1), but still requiring 2 liters of support. Will be discharged home with 2 liters of support and PCP (Dr. Murphy) for follow up.  She is chronically uncontrolled with DM II (admit A1c: 12%); subsequently, commenced to IV insulin therapy (Basal 30 daily and Aspart 8/8/8) on admission, with plans for discharge on her routine oral home regimen of Metformin and Amaryl, ADA diet. Recommend OP follow up with Endocrine. Will defer to her PCP. 24 hour POCT Blood Sugar Range prior to discharge: 135-218 mg/dl.   H/o Paranoid Schizophrenia. Not challenged this admission. Seen on consult per Inpatient Psychiatrist due to in-house pharmacy not having her routine home medication, Invega, on formulary. Appreciate the input. She is established with [facility] and will follow up post discharge.   She was seen and evaluated by inpatient PT and OT without any assessed needs at time of discharge.        ___________________________________________________________________  HPI:  Ava Driscoll is a 49 year old female  "with diabetes mellitus type 2, obesity, hyperlipidemia, shchizophrenia and hypertension who presented with shortness of breath and cough "for the past couple of days". Feels likes symptoms have worsened overtime. Denied fever, chills, chest pain, abdominal pain, nausea/vomiting or diarrhea. Patient states compliance with her medications, including the ones for diabetes. Is not on insulin.         PAST HISTORY:     Past Medical History:   Diagnosis Date    Behavioral problem     Diabetes mellitus     History of psychiatric care     History of psychiatric hospitalization     Hx of psychiatric care     Psychiatric problem     Retinopathy due to secondary diabetes     Schizophrenia     Therapy        Past Surgical History:   Procedure Laterality Date     SECTION      COLONOSCOPY N/A 2020    Procedure: COLONOSCOPY;  Surgeon: Edvin Zuniga II, MD;  Location: [facility];  Service: Endoscopy;  Laterality: N/A;    HYSTERECTOMY         Family History   Problem Relation Age of Onset    Arthritis Mother     Diabetes type II Father     Breast cancer Maternal Aunt 80        unilat    Ovarian cancer Neg Hx        Social History     Socioeconomic History    Marital status: Single     Spouse name: Not on file    Number of children: 2    Years of education: Not on file    Highest education level: Not on file   Occupational History    Not on file   Social Needs    Financial resource strain: Not on file    Food insecurity:     Worry: Not on file     Inability: Not on file    Transportation needs:     Medical: Not on file     Non-medical: Not on file   Tobacco Use    Smoking status: Never Smoker    Smokeless tobacco: Never Used   Substance and Sexual Activity    Alcohol use: No    Drug use: No    Sexual activity: Never   Lifestyle    Physical activity:     Days per week: Not on file     Minutes per session: Not on file    Stress: Not at all   Relationships    Social connections:     " Talks on phone: Not on file     Gets together: Not on file     Attends Christianity service: Not on file     Active member of club or organization: Not on file     Attends meetings of clubs or organizations: Not on file     Relationship status: Not on file   Other Topics Concern    Patient feels they ought to cut down on drinking/drug use Not Asked    Patient annoyed by others criticizing their drinking/drug use Not Asked    Patient has felt bad or guilty about drinking/drug use Not Asked    Patient has had a drink/used drugs as an eye opener in the AM Not Asked   Social History Narrative    Not on file       MEDICATIONS & ALLERGIES:     Current Outpatient Medications on File Prior to Visit   Medication Sig Dispense Refill    acetaminophen (TYLENOL) 325 MG tablet Take 2 tablets (650 mg total) by mouth every 8 (eight) hours as needed. 30 tablet 0    blood sugar diagnostic Strp Check BS once a day. 200 each 3    blood-glucose meter kit Check BS once a day. 1 each 0    empagliflozin (JARDIANCE) 25 mg Tab Take 25 mg by mouth once daily. 90 tablet 0    ergocalciferol (ERGOCALCIFEROL) 50,000 unit Cap Vitamin D2 1,250 mcg (50,000 unit) capsule   Take 1 capsule every week by oral route.      glimepiride (AMARYL) 4 MG tablet Take 1 tablet (4 mg total) by mouth before breakfast. 90 tablet 0    INVEGA SUSTENNA 156 mg/mL Syrg injection       lancets (LANCETS,ULTRA THIN) Misc Check BS once a day. 200 each 3    levocetirizine (XYZAL) 5 MG tablet Take 1 tablet (5 mg total) by mouth every evening. (Patient not taking: Reported on 4/23/2020) 30 tablet 0    losartan (COZAAR) 25 MG tablet Take 1 tablet (25 mg total) by mouth once daily. HOLD UNTIL SEEN BY PRIMARY CARE PROVIDER. (Patient not taking: Reported on 4/23/2020) 90 tablet 1    metFORMIN (GLUCOPHAGE) 1000 MG tablet TAKE ONE TABLET BY MOUTH TWICE DAILY WITH MEALS 180 tablet 0    pravastatin (PRAVACHOL) 40 MG tablet Take 1 tablet (40 mg total) by mouth once daily.  90 tablet 3     No current facility-administered medications on file prior to visit.         Review of patient's allergies indicates:   Allergen Reactions    Ace inhibitors      Cough       OBJECTIVE:     Vital Signs:  There were no vitals filed for this visit.  Body mass index is 51.49 kg/m².     Laboratory  Lab Results   Component Value Date    WBC 7.87 04/21/2020    HGB 10.9 (L) 04/21/2020    HCT 35.6 (L) 04/21/2020    MCV 99 (H) 04/21/2020     04/21/2020     Lab Results   Component Value Date    INR 1.0 11/13/2011    INR 1.1 11/28/2010    INR 1.1 11/11/2010     Lab Results   Component Value Date    HGBA1C 12.4 (H) 04/17/2020     No results for input(s): POCTGLUCOSE in the last 72 hours.    Diagnostic Results:  n/a    TRANSITION OF CARE:     [facility] Contact Note: 04/22/2020    Family and/or Caretaker present at visit?  No  Diagnostic tests reviewed/disposition: No diagnosic tests pending after this hospitalization.  Disease/illness education: Yes, Airborne/Droplet precautions  Home health/community services discussion/referrals: Patient does not have home health established from hospital visit.  They do not need home health.  If needed, we will set up home health for the patient.   Establishment or re-establishment of referral orders for community resources: No other necessary community resources.   Discussion with other health care providers: No discussion with other health care providers necessary.     Transition of Care Visit:     I have reviewed and updated the history and problem list.  I have reconciled the medication list.  I have discussed the hospitalization and current medical issues, prognosis and plans with the patient/family.  I  spent more than 50% of time discussing the care with the patient/family.  Total Face-to-Face Encounter: 60 minutes.    Medications Reconciliation:   I have reconciled the patient's home medications and discharge medications with the patient/family. I have  updated all changes.  Refer to After-Visit Medication List.    ASSESSMENT & PLAN:     HIGH RISK CONDITION(S):  Patient has a condition that poses threat to life and bodily function    Ava was seen today for transitional care.    Diagnoses and all orders for this visit:    COVID-19 virus detected  Airborne/Droplet precautions    Type 2 diabetes mellitus with hyperglycemia, without long-term current use of insulin  Continue insulin  AC and HS accu checks    Paranoid schizophrenia  Continue current meds  Denies suicidal/homicidal ideations      Were controlled substances prescribed?  No    Instructions for the patient:    Scheduled Follow-up :  No future appointments.    After Visit Medication List :     Medication List           Accurate as of May 4, 2020  5:03 PM. If you have any questions, ask your nurse or doctor.               CONTINUE taking these medications    acetaminophen 325 MG tablet  Commonly known as:  TYLENOL  Take 2 tablets (650 mg total) by mouth every 8 (eight) hours as needed.     blood sugar diagnostic Strp  Check BS once a day.     blood-glucose meter kit  Check BS once a day.     empagliflozin 25 mg Tab  Commonly known as:  JARDIANCE  Take 25 mg by mouth once daily.     ergocalciferol 50,000 unit Cap  Commonly known as:  ERGOCALCIFEROL     glimepiride 4 MG tablet  Commonly known as:  AMARYL  Take 1 tablet (4 mg total) by mouth before breakfast.     INVEGA SUSTENNA 156 mg/mL Syrg injection  Generic drug:  paliperidone palmitate     lancets Misc  Commonly known as:  LANCETS,ULTRA THIN  Check BS once a day.     levocetirizine 5 MG tablet  Commonly known as:  XYZAL  Take 1 tablet (5 mg total) by mouth every evening.     losartan 25 MG tablet  Commonly known as:  COZAAR  Take 1 tablet (25 mg total) by mouth once daily. HOLD UNTIL SEEN BY PRIMARY CARE PROVIDER.     metFORMIN 1000 MG tablet  Commonly known as:  GLUCOPHAGE  TAKE ONE TABLET BY MOUTH TWICE DAILY WITH MEALS     pravastatin 40 MG  tablet  Commonly known as:  PRAVACHOL  Take 1 tablet (40 mg total) by mouth once daily.            Signature:  Eulalia Hernández NP    Patient consent obtained prior to treatment          This service was not originating from a related E/M service provided within the previous 7 days nor will  to an E/M service or procedure within the next 24 hours or my soonest available appointment.  Prevailing standard of care was able to be met in this audio-only visit.

## 2020-06-29 ENCOUNTER — TELEPHONE (OUTPATIENT)
Dept: FAMILY MEDICINE | Facility: CLINIC | Age: 50
End: 2020-06-29

## 2020-06-29 NOTE — TELEPHONE ENCOUNTER
----- Message from Shakira Morin sent at 6/29/2020  2:00 PM CDT -----  Regarding: cmn forms  Name of Who is Calling: Naomi from [facility]       What is the request in detail: Naomi states she faxed over a CMN form she states it was faxed over 06/22/20 she needs it to be filled out a re faxed to [phone]      Can the clinic reply by [facility]: no      What [facility]: [phone]

## 2020-06-29 NOTE — TELEPHONE ENCOUNTER
Left message informing Naomi/[employer] that form has been received, but has not been completed because Dr. Murphy has been out of the office and will not return until 7/1/2020.  Will fax back when completed.

## 2020-07-14 ENCOUNTER — TELEPHONE (OUTPATIENT)
Dept: FAMILY MEDICINE | Facility: CLINIC | Age: 50
End: 2020-07-14

## 2020-07-14 NOTE — TELEPHONE ENCOUNTER
Return call to Janet- [facility], and left a message informing her per Pt that she is no longer on Oxygen and she does not need it.

## 2020-07-14 NOTE — TELEPHONE ENCOUNTER
Informed naomi with [facility] that  did nto write for oxygen back in april that was the hospital.  Naomi states she understands but that she has been trying to get in touch with hospitalist who wrote for oxygen and has been unsuccessful.  States they really need to get paid for April-July.  Let Naomi know I would route message to  and emerita what we could do.

## 2020-07-14 NOTE — TELEPHONE ENCOUNTER
----- Message from Meli Jacobson sent at 7/14/2020  1:27 PM CDT -----  Contact: Janet -[facility] [phone]  Type: Call Back    Who called:Janet -[facility]    What is the request in detail: Calling to find out the status of the CMN for oxygen. Was faxed over on 06-22-20. Also called since than, clinic has it, but  was out. Please call.     Would the patient rather a call back or a response via [facility]? Call back    Best call back number: [phone]

## 2020-07-15 NOTE — TELEPHONE ENCOUNTER
Naomi/[facility] informed of Dr. Murphy' note.  Naomi verbalized understanding, but stated that all paperwork was faxed to Dr. Murphy.  Stated she can re-fax all necessary paperwork so that Dr. Murphy will have information needed to complete forms.  Please be advise.

## 2020-07-15 NOTE — TELEPHONE ENCOUNTER
Please inform her that I just combed through the doctor's notes to locate the SaO2 that qualified the pt for home O2 and did not see it, so I won't be able to complete the paperwork without that documentation.

## 2020-09-14 DIAGNOSIS — E78.5 HYPERLIPIDEMIA ASSOCIATED WITH TYPE 2 DIABETES MELLITUS: ICD-10-CM

## 2020-09-14 DIAGNOSIS — E11.69 HYPERLIPIDEMIA ASSOCIATED WITH TYPE 2 DIABETES MELLITUS: ICD-10-CM

## 2020-09-14 RX ORDER — PRAVASTATIN SODIUM 40 MG/1
40 TABLET ORAL DAILY
Qty: 90 TABLET | Refills: 1 | Status: SHIPPED | OUTPATIENT
Start: 2020-09-14 | End: 2021-04-01

## 2020-09-14 NOTE — TELEPHONE ENCOUNTER
----- Message from Bridgett Jeffers sent at 9/14/2020  1:13 PM CDT -----  Type: RX Refill Request    Who Called: NICK TRINIDAD [[id number]]    RX Name and Strength: pravastatin (PRAVACHOL) 40 MG tablet    Preferred Pharmacy with phone number: [facility]  [address] [place] [address] [phone]    Best Call Back Number: [phone]    Additional Information: N/A

## 2020-09-24 ENCOUNTER — TELEPHONE (OUTPATIENT)
Dept: FAMILY MEDICINE | Facility: CLINIC | Age: 50
End: 2020-09-24

## 2020-09-24 DIAGNOSIS — Z12.31 ENCOUNTER FOR SCREENING MAMMOGRAM FOR MALIGNANT NEOPLASM OF BREAST: ICD-10-CM

## 2020-09-24 DIAGNOSIS — Z12.39 SCREENING FOR MALIGNANT NEOPLASM OF BREAST: Primary | ICD-10-CM

## 2020-09-29 ENCOUNTER — HOSPITAL ENCOUNTER (OUTPATIENT)
Dept: RADIOLOGY | Facility: HOSPITAL | Age: 50
Discharge: HOME | End: 2020-09-29
Attending: INTERNAL MEDICINE
Payer: MEDICARE

## 2020-09-29 DIAGNOSIS — Z12.39 SCREENING FOR MALIGNANT NEOPLASM OF BREAST: ICD-10-CM

## 2020-09-29 DIAGNOSIS — Z12.31 ENCOUNTER FOR SCREENING MAMMOGRAM FOR MALIGNANT NEOPLASM OF BREAST: ICD-10-CM

## 2020-09-29 PROCEDURE — 77063 MAMMO DIGITAL SCREENING BILAT WITH TOMO: ICD-10-PCS | Mod: 26,,, | Performed by: RADIOLOGY

## 2020-09-29 PROCEDURE — 77067 SCR MAMMO BI INCL CAD: CPT | Mod: TC

## 2020-09-29 PROCEDURE — 77063 BREAST TOMOSYNTHESIS BI: CPT | Mod: 26,,, | Performed by: RADIOLOGY

## 2020-09-29 PROCEDURE — 77067 SCR MAMMO BI INCL CAD: CPT | Mod: 26,,, | Performed by: RADIOLOGY

## 2020-09-29 PROCEDURE — 77067 MAMMO DIGITAL SCREENING BILAT WITH TOMO: ICD-10-PCS | Mod: 26,,, | Performed by: RADIOLOGY

## 2020-11-13 DIAGNOSIS — E11.9 TYPE 2 DIABETES MELLITUS WITHOUT COMPLICATION: ICD-10-CM

## 2020-12-04 DIAGNOSIS — E11.9 TYPE 2 DIABETES MELLITUS WITHOUT COMPLICATION: ICD-10-CM

## 2020-12-07 ENCOUNTER — OFFICE VISIT (OUTPATIENT)
Dept: FAMILY MEDICINE | Facility: CLINIC | Age: 50
End: 2020-12-07
Payer: MEDICARE

## 2020-12-07 ENCOUNTER — LAB VISIT (OUTPATIENT)
Dept: LAB | Facility: HOSPITAL | Age: 50
End: 2020-12-07
Attending: INTERNAL MEDICINE
Payer: MEDICARE

## 2020-12-07 VITALS
BODY MASS INDEX: 49.34 KG/M2 | DIASTOLIC BLOOD PRESSURE: 88 MMHG | HEART RATE: 88 BPM | WEIGHT: 289 LBS | SYSTOLIC BLOOD PRESSURE: 114 MMHG | RESPIRATION RATE: 16 BRPM | OXYGEN SATURATION: 98 % | HEIGHT: 64 IN | TEMPERATURE: 98 F

## 2020-12-07 DIAGNOSIS — E11.65 TYPE 2 DIABETES MELLITUS WITH HYPERGLYCEMIA, WITHOUT LONG-TERM CURRENT USE OF INSULIN: Primary | ICD-10-CM

## 2020-12-07 DIAGNOSIS — E11.65 TYPE 2 DIABETES MELLITUS WITH HYPERGLYCEMIA, WITHOUT LONG-TERM CURRENT USE OF INSULIN: ICD-10-CM

## 2020-12-07 DIAGNOSIS — E11.9 TYPE 2 DIABETES MELLITUS WITHOUT COMPLICATION: ICD-10-CM

## 2020-12-07 LAB
ALBUMIN SERPL BCP-MCNC: 3.5 G/DL (ref 3.5–5.2)
ALP SERPL-CCNC: 90 U/L (ref 55–135)
ALT SERPL W/O P-5'-P-CCNC: 12 U/L (ref 10–44)
ANION GAP SERPL CALC-SCNC: 7 MMOL/L (ref 8–16)
AST SERPL-CCNC: 11 U/L (ref 10–40)
BASOPHILS # BLD AUTO: 0.03 K/UL (ref 0–0.2)
BASOPHILS NFR BLD: 0.5 % (ref 0–1.9)
BILIRUB SERPL-MCNC: 1 MG/DL (ref 0.1–1)
BUN SERPL-MCNC: 10 MG/DL (ref 6–20)
CALCIUM SERPL-MCNC: 8.9 MG/DL (ref 8.7–10.5)
CHLORIDE SERPL-SCNC: 101 MMOL/L (ref 95–110)
CHOLEST SERPL-MCNC: 144 MG/DL (ref 120–199)
CHOLEST/HDLC SERPL: 4 {RATIO} (ref 2–5)
CO2 SERPL-SCNC: 28 MMOL/L (ref 23–29)
CREAT SERPL-MCNC: 1.1 MG/DL (ref 0.5–1.4)
DIFFERENTIAL METHOD: ABNORMAL
EOSINOPHIL # BLD AUTO: 0.1 K/UL (ref 0–0.5)
EOSINOPHIL NFR BLD: 2.2 % (ref 0–8)
ERYTHROCYTE [DISTWIDTH] IN BLOOD BY AUTOMATED COUNT: 14.2 % (ref 11.5–14.5)
EST. GFR  (AFRICAN AMERICAN): >60 ML/MIN/1.73 M^2
EST. GFR  (NON AFRICAN AMERICAN): 59 ML/MIN/1.73 M^2
GLUCOSE SERPL-MCNC: 315 MG/DL (ref 70–110)
HCT VFR BLD AUTO: 33.8 % (ref 37–48.5)
HDLC SERPL-MCNC: 36 MG/DL (ref 40–75)
HDLC SERPL: 25 % (ref 20–50)
HGB BLD-MCNC: 11.4 G/DL (ref 12–16)
IMM GRANULOCYTES # BLD AUTO: 0.03 K/UL (ref 0–0.04)
IMM GRANULOCYTES NFR BLD AUTO: 0.5 % (ref 0–0.5)
LDLC SERPL CALC-MCNC: 86 MG/DL (ref 63–159)
LYMPHOCYTES # BLD AUTO: 2.7 K/UL (ref 1–4.8)
LYMPHOCYTES NFR BLD: 42.2 % (ref 18–48)
MCH RBC QN AUTO: 37.9 PG (ref 27–31)
MCHC RBC AUTO-ENTMCNC: 33.7 G/DL (ref 32–36)
MCV RBC AUTO: 112 FL (ref 82–98)
MONOCYTES # BLD AUTO: 0.2 K/UL (ref 0.3–1)
MONOCYTES NFR BLD: 3.6 % (ref 4–15)
NEUTROPHILS # BLD AUTO: 3.3 K/UL (ref 1.8–7.7)
NEUTROPHILS NFR BLD: 51 % (ref 38–73)
NONHDLC SERPL-MCNC: 108 MG/DL
NRBC BLD-RTO: 0 /100 WBC
PLATELET # BLD AUTO: 266 K/UL (ref 150–350)
PMV BLD AUTO: 9.9 FL (ref 9.2–12.9)
POTASSIUM SERPL-SCNC: 4.6 MMOL/L (ref 3.5–5.1)
PROT SERPL-MCNC: 7.6 G/DL (ref 6–8.4)
RBC # BLD AUTO: 3.01 M/UL (ref 4–5.4)
SODIUM SERPL-SCNC: 136 MMOL/L (ref 136–145)
TRIGL SERPL-MCNC: 110 MG/DL (ref 30–150)
TSH SERPL DL<=0.005 MIU/L-ACNC: 1.35 UIU/ML (ref 0.4–4)
WBC # BLD AUTO: 6.47 K/UL (ref 3.9–12.7)

## 2020-12-07 PROCEDURE — 99213 OFFICE O/P EST LOW 20 MIN: CPT | Mod: PBBFAC,PO | Performed by: INTERNAL MEDICINE

## 2020-12-07 PROCEDURE — 99999 PR PBB SHADOW E&M-EST. PATIENT-LVL III: CPT | Mod: PBBFAC,,, | Performed by: INTERNAL MEDICINE

## 2020-12-07 PROCEDURE — 80061 LIPID PANEL: CPT

## 2020-12-07 PROCEDURE — 84443 ASSAY THYROID STIM HORMONE: CPT

## 2020-12-07 PROCEDURE — 83036 HEMOGLOBIN GLYCOSYLATED A1C: CPT

## 2020-12-07 PROCEDURE — 80053 COMPREHEN METABOLIC PANEL: CPT

## 2020-12-07 PROCEDURE — 85025 COMPLETE CBC W/AUTO DIFF WBC: CPT

## 2020-12-07 PROCEDURE — 99999 PR PBB SHADOW E&M-EST. PATIENT-LVL III: ICD-10-PCS | Mod: PBBFAC,,, | Performed by: INTERNAL MEDICINE

## 2020-12-07 PROCEDURE — 99214 OFFICE O/P EST MOD 30 MIN: CPT | Mod: S$PBB,,, | Performed by: INTERNAL MEDICINE

## 2020-12-07 PROCEDURE — 99214 PR OFFICE/OUTPT VISIT, EST, LEVL IV, 30-39 MIN: ICD-10-PCS | Mod: S$PBB,,, | Performed by: INTERNAL MEDICINE

## 2020-12-07 PROCEDURE — 36415 COLL VENOUS BLD VENIPUNCTURE: CPT | Mod: PO

## 2020-12-07 RX ORDER — METFORMIN HYDROCHLORIDE 1000 MG/1
1000 TABLET ORAL 2 TIMES DAILY WITH MEALS
Qty: 180 TABLET | Refills: 0 | Status: SHIPPED | OUTPATIENT
Start: 2020-12-07 | End: 2021-07-01 | Stop reason: SDUPTHER

## 2020-12-07 RX ORDER — GLIMEPIRIDE 4 MG/1
4 TABLET ORAL
Qty: 90 TABLET | Refills: 0 | Status: SHIPPED | OUTPATIENT
Start: 2020-12-07 | End: 2021-07-28

## 2020-12-07 RX ORDER — EMPAGLIFLOZIN 25 MG/1
25 TABLET, FILM COATED ORAL DAILY
Qty: 90 TABLET | Refills: 0 | Status: SHIPPED | OUTPATIENT
Start: 2020-12-07 | End: 2021-07-01 | Stop reason: SDUPTHER

## 2020-12-07 NOTE — PROGRESS NOTES
SUBJECTIVE     Chief Complaint   Patient presents with    Annual Exam     refills       HPI  Ava Driscoll is a 50 y.o. female with multiple medical diagnoses as listed in the medical history and problem list that presents for follow-up for evelyn refills for DM2.  Patient has been doing okay since her last visit.  She has not been compliant with as she only takes nightly.  She has not been taking glimepiride or Jardiance because she is not aware that she was supposed to be taking them.  Otherwise, patient is non compliant with an ADA diet and does not exercise.  She denies any hypoglycemic episodes since her last visit.  Patient is without any complaints today and simply presents for med refills.    PAST MEDICAL HISTORY:  Past Medical History:   Diagnosis Date    Behavioral problem     Diabetes mellitus     History of psychiatric care     History of psychiatric hospitalization     Hx of psychiatric care     Psychiatric problem     Retinopathy due to secondary diabetes     Schizophrenia     Therapy        PAST SURGICAL HISTORY:  Past Surgical History:   Procedure Laterality Date     SECTION      COLONOSCOPY N/A 2020    Procedure: COLONOSCOPY;  Surgeon: Edvin Zuniga II, MD;  Location: [facility];  Service: Endoscopy;  Laterality: N/A;    HYSTERECTOMY         SOCIAL HISTORY:  Social History     Socioeconomic History    Marital status: Single     Spouse name: Not on file    Number of children: 2    Years of education: Not on file    Highest education level: Not on file   Occupational History    Not on file   Social Needs    Financial resource strain: Not on file    Food insecurity     Worry: Not on file     Inability: Not on file    Transportation needs     Medical: Not on file     Non-medical: Not on file   Tobacco Use    Smoking status: Never Smoker    Smokeless tobacco: Never Used   Substance and Sexual Activity    Alcohol use: No    Drug use: No    Sexual activity: Never    Lifestyle    Physical activity     Days per week: Not on file     Minutes per session: Not on file    Stress: Not at all   Relationships    Social connections     Talks on phone: Not on file     Gets together: Not on file     Attends Synagogue service: Not on file     Active member of club or organization: Not on file     Attends meetings of clubs or organizations: Not on file     Relationship status: Not on file   Other Topics Concern    Patient feels they ought to cut down on drinking/drug use Not Asked    Patient annoyed by others criticizing their drinking/drug use Not Asked    Patient has felt bad or guilty about drinking/drug use Not Asked    Patient has had a drink/used drugs as an eye opener in the AM Not Asked   Social History Narrative    Not on file       FAMILY HISTORY:  Family History   Problem Relation Age of Onset    Arthritis Mother     Diabetes type II Father     Breast cancer Maternal Aunt 80        unilat    Ovarian cancer Neg Hx        ALLERGIES AND MEDICATIONS: updated and reviewed.  Review of patient's allergies indicates:   Allergen Reactions    Ace inhibitors      Cough     Current Outpatient Medications   Medication Sig Dispense Refill    blood sugar diagnostic Strp Check BS once a day. 200 each 3    blood-glucose meter kit Check BS once a day. 1 each 0    ergocalciferol (ERGOCALCIFEROL) 50,000 unit Cap Vitamin D2 1,250 mcg (50,000 unit) capsule   Take 1 capsule every week by oral route.      INVEGA SUSTENNA 156 mg/mL Syrg injection       lancets (LANCETS,ULTRA THIN) Misc Check BS once a day. 200 each 3    metFORMIN (GLUCOPHAGE) 1000 MG tablet Take 1 tablet (1,000 mg total) by mouth 2 (two) times daily with meals. 180 tablet 0    pravastatin (PRAVACHOL) 40 MG tablet Take 1 tablet (40 mg total) by mouth once daily. 90 tablet 1    empagliflozin (JARDIANCE) 25 mg tablet Take 1 tablet (25 mg total) by mouth once daily. 90 tablet 0    glimepiride (AMARYL) 4 MG tablet Take  "1 tablet (4 mg total) by mouth before breakfast. 90 tablet 0    losartan (COZAAR) 25 MG tablet Take 1 tablet (25 mg total) by mouth once daily. HOLD UNTIL SEEN BY PRIMARY CARE PROVIDER. (Patient not taking: Reported on 4/23/2020) 90 tablet 1     No current facility-administered medications for this visit.        ROS  Review of Systems   Constitutional: Negative for chills and fever.   HENT: Negative for hearing loss and sore throat.    Eyes: Negative for visual disturbance.   Respiratory: Negative for cough and shortness of breath.    Cardiovascular: Negative for chest pain, palpitations and leg swelling.   Gastrointestinal: Negative for abdominal pain, constipation, diarrhea, nausea and vomiting.   Genitourinary: Negative for dysuria, frequency and urgency.   Musculoskeletal: Negative for arthralgias, joint swelling and myalgias.   Skin: Negative for rash and wound.   Neurological: Negative for headaches.   Psychiatric/Behavioral: Negative for agitation and confusion. The patient is not nervous/anxious.          OBJECTIVE     Physical Exam  Vitals:    12/07/20 1326   BP: 114/88   Pulse: 88   Resp: 16   Temp: 98.4 °F (36.9 °C)    Body mass index is 49.61 kg/m².  Weight: 131.1 kg (289 lb 0.4 oz)   Height: 5' 4" (162.6 cm)     Physical Exam  Constitutional:       General: She is not in acute distress.     Appearance: She is well-developed.   HENT:      Head: Normocephalic and atraumatic.      Right Ear: External ear normal.      Left Ear: External ear normal.      Nose: Nose normal.   Eyes:      General: No scleral icterus.        Right eye: No discharge.         Left eye: No discharge.      Conjunctiva/sclera: Conjunctivae normal.   Neck:      Musculoskeletal: Normal range of motion and neck supple.      Vascular: No JVD.      Trachea: No tracheal deviation.   Cardiovascular:      Rate and Rhythm: Normal rate and regular rhythm.      Heart sounds: No murmur. No friction rub. No gallop.    Pulmonary:      Effort: " Pulmonary effort is normal. No respiratory distress.      Breath sounds: Normal breath sounds. No wheezing.   Abdominal:      General: Bowel sounds are normal. There is no distension.      Palpations: Abdomen is soft. There is no mass.      Tenderness: There is no abdominal tenderness. There is no guarding or rebound.   Musculoskeletal: Normal range of motion.         General: No tenderness or deformity.      Right foot: Normal range of motion.      Left foot: Normal range of motion.   Feet:      Right foot:      Protective Sensation: 10 sites tested. 10 sites sensed.      Skin integrity: Callus and dry skin present. No ulcer, blister or skin breakdown.      Left foot:      Protective Sensation: 10 sites tested. 10 sites sensed.      Skin integrity: Callus and dry skin present. No ulcer, blister or skin breakdown.   Skin:     General: Skin is warm and dry.      Findings: No erythema or rash.   Neurological:      Mental Status: She is alert and oriented to person, place, and time.      Motor: No abnormal muscle tone.      Coordination: Coordination normal.   Psychiatric:         Behavior: Behavior normal.         Thought Content: Thought content normal.         Judgment: Judgment normal.           Health Maintenance       Date Due Completion Date    TETANUS VACCINE 10/19/1988 ---    Pneumococcal Vaccine (Medium Risk) (1 of 1 - PPSV23) 10/19/1989 ---    Hemoglobin A1c 07/17/2020 4/17/2020    Influenza Vaccine (1) 08/01/2020 ---    Foot Exam 10/17/2020 10/17/2019 (Done)    Override on 10/17/2019: Done (Seen by Dr. Laurent)    Override on 11/26/2018: Done    Shingles Vaccine (1 of 2) 10/19/2020 ---    Lipid Panel 10/23/2020 10/23/2019    Eye Exam 11/07/2020 11/7/2019    Low Dose Statin 12/07/2021 12/7/2020    Mammogram 09/29/2022 9/29/2020    Colorectal Cancer Screening 02/18/2025 2/18/2020            ASSESSMENT     50 y.o. female with     1. Type 2 diabetes mellitus with hyperglycemia, without long-term current use of  insulin    2. BMI 45.0-49.9, adult        PLAN:     1. Type 2 diabetes mellitus with hyperglycemia, without long-term current use of insulin  - Poorly controlled 2/2 non-compliance with meds; pt instructed to take Metformin BID and will have pt start Glimepiride and Jardiance as previously ordered, but pt reportedly unaware that she should be taking  - Strongly encouraged to start an ADA diet, avoid walking barefoot/wearing shoe with good sole, and doing foot exam daily  - blood sugar diagnostic Strp; Check BS once a day.  Dispense: 200 each; Refill: 3  - metFORMIN (GLUCOPHAGE) 1000 MG tablet; Take 1 tablet (1,000 mg total) by mouth 2 (two) times daily with meals.  Dispense: 180 tablet; Refill: 0  - Microalbumin/Creatinine Ratio, Urine; Future  - CBC Auto Differential; Future  - Comprehensive Metabolic Panel; Future  - TSH; Future  - glimepiride (AMARYL) 4 MG tablet; Take 1 tablet (4 mg total) by mouth before breakfast.  Dispense: 90 tablet; Refill: 0  - empagliflozin (JARDIANCE) 25 mg tablet; Take 1 tablet (25 mg total) by mouth once daily.  Dispense: 90 tablet; Refill: 0  - Pt to f/u with Optometry-Dr. Fish for annual eye exam and have records sent here thereafter for update into the chart    2. BMI 45.0-49.9, adult  - Pt aware of importance of eating a prudent diet and exercising      RTC in 3 months; pt refuses all vaccinations today     Keerthi Murphy MD  12/07/2020 2:04 PM        No follow-ups on file.

## 2020-12-08 ENCOUNTER — TELEPHONE (OUTPATIENT)
Dept: FAMILY MEDICINE | Facility: CLINIC | Age: 50
End: 2020-12-08

## 2020-12-09 ENCOUNTER — TELEPHONE (OUTPATIENT)
Dept: FAMILY MEDICINE | Facility: CLINIC | Age: 50
End: 2020-12-09

## 2020-12-09 DIAGNOSIS — E11.65 TYPE 2 DIABETES MELLITUS WITH HYPERGLYCEMIA, WITHOUT LONG-TERM CURRENT USE OF INSULIN: ICD-10-CM

## 2020-12-09 DIAGNOSIS — E11.9 TYPE 2 DIABETES MELLITUS WITHOUT COMPLICATION: ICD-10-CM

## 2020-12-09 DIAGNOSIS — D53.9 MACROCYTIC ANEMIA: Primary | ICD-10-CM

## 2020-12-09 LAB
ESTIMATED AVG GLUCOSE: 283 MG/DL (ref 68–131)
HBA1C MFR BLD HPLC: 11.5 % (ref 4–5.6)

## 2020-12-10 NOTE — TELEPHONE ENCOUNTER
Relayed results to pt.  Instructed about new medications.  Instructed about additional labs needed. Will come in for labs when convenient.

## 2020-12-10 NOTE — TELEPHONE ENCOUNTER
Please call and give pt results of 12/9 letter. Jardiance and Glimepiride have already been sent for her. Please assist with lab scheduling.

## 2020-12-28 ENCOUNTER — TELEPHONE (OUTPATIENT)
Dept: FAMILY MEDICINE | Facility: CLINIC | Age: 50
End: 2020-12-28

## 2020-12-28 NOTE — LETTER
December 28, 2020    Ava Higginsey  [address]  [facility] [address]             [address], [facility]  [facility] [address]  Phone: [phone]  Fax: [phone] Dear Ms. Driscoll:    Sorry we were unable to contact you to schedule your Endocrinology  appointment. Please give the referral department a call at [phone].        If you have any questions or concerns, please don't hesitate to call.    Sincerely,        Keerthi Murphy MD

## 2021-01-06 NOTE — PT/OT/SLP EVAL
Physical Therapy Evaluation    Patient Name:  Ava Driscoll   MRN:  [id number]    Recommendations:     Discharge Recommendations:      Discharge Equipment Recommendations: none   Barriers to discharge: None    Assessment:     Ava Driscoll is a 49 y.o. female admitted with a medical diagnosis of Acute respiratory failure with hypoxia.  She presents with the following impairments/functional limitations:  weakness, impaired cardiopulmonary response to activity, impaired endurance, impaired balance, decreased lower extremity function, impaired functional mobilty .    Pt is safe to ambulate in room unassisted; encouraged to do so, unless feeling weak or O2 sats <90% per monitor. D/w RN.       Rehab Prognosis: Good; patient would benefit from acute skilled PT services to address these deficits and reach maximum level of function.    Recent Surgery: * No surgery found *      Plan:     During this hospitalization, patient to be seen 3 x/week to address the identified rehab impairments via gait training, therapeutic activities, therapeutic exercises, neuromuscular re-education and progress toward the following goals:    · Plan of Care Expires:  04/27/20    Subjective     Chief Complaint: no complaints  Patient/Family Comments/goals: none verbalized  Pain/Comfort:  · Pain Rating 1: 0/10    Patients cultural, spiritual, Mormon conflicts given the current situation: no    Living Environment:  Home with 19 y/o son; no AMAIRANI  Prior to admission, patients level of function was ind with all.  Equipment used at home: none.  DME owned (not currently used): none.  Upon discharge, patient will have assistance from n/a.    Objective:     Communicated with RN prior to session.  Patient found seated on BSC with oxygen, pulse ox (continuous)  upon PT entry to room.    General Precautions: Standard,     Orthopedic Precautions:N/A   Braces:       Exams:  · A&Ox4; flat affect. followed commands appropriately  · Cognitive Exam:  Patient is  oriented to Person, Place, Time and Situation  · Gross Motor Coordination:  WFL  · Postural Exam:  Patient presented with the following abnormalities:    · -       No postural abnormalities identified  · Sensation:    · -       Intact  · RLE ROM: WFL  · RLE Strength: WFL  · LLE ROM: WFL  · LLE Strength: WFL    Functional Mobility:  · Transfers:     · Sit to Stand:  modified independence with no AD  · Toilet Transfer: stand by assistance with  no AD  using  Stand Pivot  · Gait: distant supervision x 30 ft (toilet and back to bed)  · Balance: good standing unsuppored   · Pt's BSC remained attached to her hips when she stood to wipe; therapist assisted to pull commode off. Pt able to wipe with setup  · Pt sat to rest on EOB  · Pt then ambulated to bathroom and then back to EOB, endoresed SOB (O2 sats 94% on 3LO2 via NC)  · Educated pt on continuous pulse ox monitor function and purpose.       Therapeutic Activities and Exercises:   as above      AM-PAC 6 CLICK MOBILITY  Total Score:24     Patient left up in chair with OT present.    GOALS:   Multidisciplinary Problems     Physical Therapy Goals        Problem: Physical Therapy Goal    Goal Priority Disciplines Outcome Goal Variances Interventions   Physical Therapy Goal     PT, PT/OT Ongoing, Progressing     Description:  Goals to be met by:      Patient will increase functional independence with mobility by performin. Gait  x 100 feet with Modified [place] with stable O2 sats.   2. Stand for 5 minutes with [place] for ADLs at sink  3. Lower extremity exercise program x20 reps per handout, with supervision                      History:     Past Medical History:   Diagnosis Date    Behavioral problem     Diabetes mellitus     History of psychiatric care     History of psychiatric hospitalization     Hx of psychiatric care     Psychiatric problem     Retinopathy due to secondary diabetes     Schizophrenia     Therapy        Past Surgical  History:   Procedure Laterality Date     SECTION      COLONOSCOPY N/A 2020    Procedure: COLONOSCOPY;  Surgeon: Edvin Zuniga II, MD;  Location: [facility];  Service: Endoscopy;  Laterality: N/A;    HYSTERECTOMY         Time Tracking:     PT Received On: 20  PT Start Time: 1451     PT Stop Time: 1510  PT Total Time (min): 19 min     Billable Minutes: Evaluation 19      Princess Odonnell, PT  2020   98.1

## 2021-01-27 DIAGNOSIS — E11.9 TYPE 2 DIABETES MELLITUS WITHOUT COMPLICATION: ICD-10-CM

## 2021-02-22 ENCOUNTER — PATIENT OUTREACH (OUTPATIENT)
Dept: ADMINISTRATIVE | Facility: HOSPITAL | Age: 51
End: 2021-02-22

## 2021-03-01 LAB
LEFT EYE DM RETINOPATHY: NEGATIVE
RIGHT EYE DM RETINOPATHY: NEGATIVE

## 2021-05-05 DIAGNOSIS — E78.5 HYPERLIPIDEMIA ASSOCIATED WITH TYPE 2 DIABETES MELLITUS: ICD-10-CM

## 2021-05-05 DIAGNOSIS — E11.69 HYPERLIPIDEMIA ASSOCIATED WITH TYPE 2 DIABETES MELLITUS: ICD-10-CM

## 2021-05-05 RX ORDER — PRAVASTATIN SODIUM 40 MG/1
40 TABLET ORAL DAILY
Qty: 90 TABLET | Refills: 1 | Status: SHIPPED | OUTPATIENT
Start: 2021-05-05 | End: 2021-07-01 | Stop reason: SDUPTHER

## 2021-05-26 DIAGNOSIS — E11.65 TYPE 2 DIABETES MELLITUS WITH HYPERGLYCEMIA, WITHOUT LONG-TERM CURRENT USE OF INSULIN: ICD-10-CM

## 2021-05-26 RX ORDER — EMPAGLIFLOZIN 25 MG/1
25 TABLET, FILM COATED ORAL DAILY
Qty: 90 TABLET | Refills: 0 | OUTPATIENT
Start: 2021-05-26

## 2021-06-30 ENCOUNTER — PATIENT OUTREACH (OUTPATIENT)
Dept: ADMINISTRATIVE | Facility: HOSPITAL | Age: 51
End: 2021-06-30

## 2021-07-01 ENCOUNTER — OFFICE VISIT (OUTPATIENT)
Dept: FAMILY MEDICINE | Facility: CLINIC | Age: 51
End: 2021-07-01
Payer: MEDICARE

## 2021-07-01 ENCOUNTER — LAB VISIT (OUTPATIENT)
Dept: LAB | Facility: HOSPITAL | Age: 51
End: 2021-07-01
Attending: INTERNAL MEDICINE
Payer: MEDICARE

## 2021-07-01 VITALS
SYSTOLIC BLOOD PRESSURE: 98 MMHG | WEIGHT: 280.19 LBS | TEMPERATURE: 98 F | BODY MASS INDEX: 47.83 KG/M2 | DIASTOLIC BLOOD PRESSURE: 74 MMHG | HEART RATE: 107 BPM | HEIGHT: 64 IN | OXYGEN SATURATION: 98 %

## 2021-07-01 DIAGNOSIS — E11.9 TYPE 2 DIABETES MELLITUS WITHOUT COMPLICATION: ICD-10-CM

## 2021-07-01 DIAGNOSIS — R05.9 COUGH: ICD-10-CM

## 2021-07-01 DIAGNOSIS — Z23 NEED FOR SHINGLES VACCINE: ICD-10-CM

## 2021-07-01 DIAGNOSIS — E11.65 TYPE 2 DIABETES MELLITUS WITH HYPERGLYCEMIA, WITHOUT LONG-TERM CURRENT USE OF INSULIN: ICD-10-CM

## 2021-07-01 DIAGNOSIS — E11.69 HYPERLIPIDEMIA ASSOCIATED WITH TYPE 2 DIABETES MELLITUS: ICD-10-CM

## 2021-07-01 DIAGNOSIS — E11.65 TYPE 2 DIABETES MELLITUS WITH HYPERGLYCEMIA, WITHOUT LONG-TERM CURRENT USE OF INSULIN: Primary | ICD-10-CM

## 2021-07-01 DIAGNOSIS — E78.5 HYPERLIPIDEMIA ASSOCIATED WITH TYPE 2 DIABETES MELLITUS: ICD-10-CM

## 2021-07-01 DIAGNOSIS — D53.9 MACROCYTIC ANEMIA: ICD-10-CM

## 2021-07-01 DIAGNOSIS — N25.81 SECONDARY HYPERPARATHYROIDISM: ICD-10-CM

## 2021-07-01 PROCEDURE — 83036 HEMOGLOBIN GLYCOSYLATED A1C: CPT | Performed by: INTERNAL MEDICINE

## 2021-07-01 PROCEDURE — 99213 OFFICE O/P EST LOW 20 MIN: CPT | Mod: PBBFAC,PO | Performed by: INTERNAL MEDICINE

## 2021-07-01 PROCEDURE — 99999 PR PBB SHADOW E&M-EST. PATIENT-LVL III: ICD-10-PCS | Mod: PBBFAC,,, | Performed by: INTERNAL MEDICINE

## 2021-07-01 PROCEDURE — 99999 PR PBB SHADOW E&M-EST. PATIENT-LVL III: CPT | Mod: PBBFAC,,, | Performed by: INTERNAL MEDICINE

## 2021-07-01 PROCEDURE — 83540 ASSAY OF IRON: CPT | Performed by: INTERNAL MEDICINE

## 2021-07-01 PROCEDURE — 99214 PR OFFICE/OUTPT VISIT, EST, LEVL IV, 30-39 MIN: ICD-10-PCS | Mod: S$PBB,,, | Performed by: INTERNAL MEDICINE

## 2021-07-01 PROCEDURE — 82728 ASSAY OF FERRITIN: CPT | Performed by: INTERNAL MEDICINE

## 2021-07-01 PROCEDURE — 90471 IMMUNIZATION ADMIN: CPT | Mod: PBBFAC,PO

## 2021-07-01 PROCEDURE — 82607 VITAMIN B-12: CPT | Performed by: INTERNAL MEDICINE

## 2021-07-01 PROCEDURE — 82746 ASSAY OF FOLIC ACID SERUM: CPT | Performed by: INTERNAL MEDICINE

## 2021-07-01 PROCEDURE — 80053 COMPREHEN METABOLIC PANEL: CPT | Performed by: INTERNAL MEDICINE

## 2021-07-01 PROCEDURE — 85025 COMPLETE CBC W/AUTO DIFF WBC: CPT | Performed by: INTERNAL MEDICINE

## 2021-07-01 PROCEDURE — 99214 OFFICE O/P EST MOD 30 MIN: CPT | Mod: S$PBB,,, | Performed by: INTERNAL MEDICINE

## 2021-07-01 RX ORDER — PRAVASTATIN SODIUM 40 MG/1
40 TABLET ORAL DAILY
Qty: 90 TABLET | Refills: 1 | Status: SHIPPED | OUTPATIENT
Start: 2021-07-01 | End: 2022-08-18 | Stop reason: SDUPTHER

## 2021-07-01 RX ORDER — INSULIN PUMP SYRINGE, 3 ML
EACH MISCELLANEOUS
Qty: 1 EACH | Refills: 0 | Status: SHIPPED | OUTPATIENT
Start: 2021-07-01 | End: 2021-07-28

## 2021-07-01 RX ORDER — EMPAGLIFLOZIN 25 MG/1
25 TABLET, FILM COATED ORAL DAILY
Qty: 90 TABLET | Refills: 0 | Status: SHIPPED | OUTPATIENT
Start: 2021-07-01 | End: 2021-11-11 | Stop reason: SDUPTHER

## 2021-07-01 RX ORDER — LANCETS
EACH MISCELLANEOUS
Qty: 200 EACH | Refills: 3 | Status: SHIPPED | OUTPATIENT
Start: 2021-07-01 | End: 2021-07-28

## 2021-07-01 RX ORDER — METFORMIN HYDROCHLORIDE 1000 MG/1
1000 TABLET ORAL 2 TIMES DAILY WITH MEALS
Qty: 180 TABLET | Refills: 0 | Status: SHIPPED | OUTPATIENT
Start: 2021-07-01 | End: 2022-01-27 | Stop reason: SDUPTHER

## 2021-07-01 RX ORDER — LOSARTAN POTASSIUM 25 MG/1
25 TABLET ORAL DAILY
Qty: 90 TABLET | Refills: 1 | Status: SHIPPED | OUTPATIENT
Start: 2021-07-01 | End: 2023-09-26

## 2021-07-02 LAB
ALBUMIN SERPL BCP-MCNC: 3.7 G/DL (ref 3.5–5.2)
ALP SERPL-CCNC: 69 U/L (ref 55–135)
ALT SERPL W/O P-5'-P-CCNC: 13 U/L (ref 10–44)
ANION GAP SERPL CALC-SCNC: 11 MMOL/L (ref 8–16)
ANISOCYTOSIS BLD QL SMEAR: ABNORMAL
AST SERPL-CCNC: 16 U/L (ref 10–40)
BASOPHILS # BLD AUTO: 0.04 K/UL (ref 0–0.2)
BASOPHILS NFR BLD: 0.5 % (ref 0–1.9)
BILIRUB SERPL-MCNC: 1.7 MG/DL (ref 0.1–1)
BUN SERPL-MCNC: 11 MG/DL (ref 6–20)
CALCIUM SERPL-MCNC: 8.9 MG/DL (ref 8.7–10.5)
CHLORIDE SERPL-SCNC: 100 MMOL/L (ref 95–110)
CO2 SERPL-SCNC: 25 MMOL/L (ref 23–29)
CREAT SERPL-MCNC: 1 MG/DL (ref 0.5–1.4)
DIFFERENTIAL METHOD: ABNORMAL
EOSINOPHIL # BLD AUTO: 0.2 K/UL (ref 0–0.5)
EOSINOPHIL NFR BLD: 2.4 % (ref 0–8)
ERYTHROCYTE [DISTWIDTH] IN BLOOD BY AUTOMATED COUNT: 16.4 % (ref 11.5–14.5)
EST. GFR  (AFRICAN AMERICAN): >60 ML/MIN/1.73 M^2
EST. GFR  (NON AFRICAN AMERICAN): >60 ML/MIN/1.73 M^2
ESTIMATED AVG GLUCOSE: 258 MG/DL (ref 68–131)
FERRITIN SERPL-MCNC: 569 NG/ML (ref 20–300)
GLUCOSE SERPL-MCNC: 343 MG/DL (ref 70–110)
HBA1C MFR BLD: 10.6 % (ref 4–5.6)
HCT VFR BLD AUTO: 29.9 % (ref 37–48.5)
HGB BLD-MCNC: 9.5 G/DL (ref 12–16)
HYPOCHROMIA BLD QL SMEAR: ABNORMAL
IMM GRANULOCYTES # BLD AUTO: 0.04 K/UL (ref 0–0.04)
IMM GRANULOCYTES NFR BLD AUTO: 0.5 % (ref 0–0.5)
IRON SERPL-MCNC: 105 UG/DL (ref 30–160)
LYMPHOCYTES # BLD AUTO: 2.7 K/UL (ref 1–4.8)
LYMPHOCYTES NFR BLD: 32 % (ref 18–48)
MCH RBC QN AUTO: 41.1 PG (ref 27–31)
MCHC RBC AUTO-ENTMCNC: 31.8 G/DL (ref 32–36)
MCV RBC AUTO: 129 FL (ref 82–98)
MONOCYTES # BLD AUTO: 0.2 K/UL (ref 0.3–1)
MONOCYTES NFR BLD: 1.8 % (ref 4–15)
NEUTROPHILS # BLD AUTO: 5.2 K/UL (ref 1.8–7.7)
NEUTROPHILS NFR BLD: 62.8 % (ref 38–73)
NRBC BLD-RTO: 0 /100 WBC
PLATELET # BLD AUTO: 234 K/UL (ref 150–450)
PLATELET BLD QL SMEAR: ABNORMAL
PMV BLD AUTO: 10.2 FL (ref 9.2–12.9)
POLYCHROMASIA BLD QL SMEAR: ABNORMAL
POTASSIUM SERPL-SCNC: 4.6 MMOL/L (ref 3.5–5.1)
PROT SERPL-MCNC: 7.4 G/DL (ref 6–8.4)
RBC # BLD AUTO: 2.31 M/UL (ref 4–5.4)
SATURATED IRON: 34 % (ref 20–50)
SODIUM SERPL-SCNC: 136 MMOL/L (ref 136–145)
TOTAL IRON BINDING CAPACITY: 308 UG/DL (ref 250–450)
TRANSFERRIN SERPL-MCNC: 208 MG/DL (ref 200–375)
WBC # BLD AUTO: 8.34 K/UL (ref 3.9–12.7)

## 2021-07-03 ENCOUNTER — TELEPHONE (OUTPATIENT)
Dept: FAMILY MEDICINE | Facility: CLINIC | Age: 51
End: 2021-07-03

## 2021-07-03 DIAGNOSIS — E53.8 VITAMIN B12 DEFICIENCY: Primary | ICD-10-CM

## 2021-07-03 DIAGNOSIS — E11.65 TYPE 2 DIABETES MELLITUS WITH HYPERGLYCEMIA, WITHOUT LONG-TERM CURRENT USE OF INSULIN: ICD-10-CM

## 2021-07-03 LAB
FOLATE SERPL-MCNC: 9.1 NG/ML (ref 4–24)
VIT B12 SERPL-MCNC: <146 PG/ML (ref 210–950)

## 2021-07-03 RX ORDER — CYANOCOBALAMIN 1000 UG/ML
1000 INJECTION, SOLUTION INTRAMUSCULAR; SUBCUTANEOUS
Status: DISCONTINUED | OUTPATIENT
Start: 2021-07-06 | End: 2021-12-17 | Stop reason: HOSPADM

## 2021-07-07 ENCOUNTER — TELEPHONE (OUTPATIENT)
Dept: FAMILY MEDICINE | Facility: CLINIC | Age: 51
End: 2021-07-07

## 2021-07-22 ENCOUNTER — PATIENT OUTREACH (OUTPATIENT)
Dept: ADMINISTRATIVE | Facility: OTHER | Age: 51
End: 2021-07-22

## 2021-07-28 ENCOUNTER — OFFICE VISIT (OUTPATIENT)
Dept: ENDOCRINOLOGY | Facility: CLINIC | Age: 51
End: 2021-07-28
Payer: MEDICARE

## 2021-07-28 VITALS
DIASTOLIC BLOOD PRESSURE: 60 MMHG | WEIGHT: 271.63 LBS | SYSTOLIC BLOOD PRESSURE: 116 MMHG | TEMPERATURE: 99 F | HEART RATE: 100 BPM | BODY MASS INDEX: 46.62 KG/M2

## 2021-07-28 DIAGNOSIS — E11.65 TYPE 2 DIABETES MELLITUS WITH HYPERGLYCEMIA, WITHOUT LONG-TERM CURRENT USE OF INSULIN: Primary | ICD-10-CM

## 2021-07-28 DIAGNOSIS — F20.0 PARANOID SCHIZOPHRENIA: ICD-10-CM

## 2021-07-28 DIAGNOSIS — E55.9 VITAMIN D DEFICIENCY: ICD-10-CM

## 2021-07-28 LAB — GLUCOSE SERPL-MCNC: 213 MG/DL (ref 70–110)

## 2021-07-28 PROCEDURE — 99204 OFFICE O/P NEW MOD 45 MIN: CPT | Mod: S$PBB,,, | Performed by: HOSPITALIST

## 2021-07-28 PROCEDURE — 99999 PR PBB SHADOW E&M-EST. PATIENT-LVL IV: ICD-10-PCS | Mod: PBBFAC,,, | Performed by: HOSPITALIST

## 2021-07-28 PROCEDURE — 82962 GLUCOSE BLOOD TEST: CPT | Mod: PBBFAC,PN | Performed by: HOSPITALIST

## 2021-07-28 PROCEDURE — 99214 OFFICE O/P EST MOD 30 MIN: CPT | Mod: PBBFAC,PN | Performed by: HOSPITALIST

## 2021-07-28 PROCEDURE — 99999 PR PBB SHADOW E&M-EST. PATIENT-LVL IV: CPT | Mod: PBBFAC,,, | Performed by: HOSPITALIST

## 2021-07-28 PROCEDURE — 99204 PR OFFICE/OUTPT VISIT, NEW, LEVL IV, 45-59 MIN: ICD-10-PCS | Mod: S$PBB,,, | Performed by: HOSPITALIST

## 2021-07-28 RX ORDER — INSULIN PUMP SYRINGE, 3 ML
EACH MISCELLANEOUS
Qty: 1 EACH | Refills: 0 | Status: SHIPPED | OUTPATIENT
Start: 2021-07-28

## 2021-07-28 RX ORDER — GLIMEPIRIDE 4 MG/1
4 TABLET ORAL
Qty: 30 TABLET | Refills: 6 | Status: SHIPPED | OUTPATIENT
Start: 2021-07-28 | End: 2022-01-27

## 2021-07-28 RX ORDER — LANCING DEVICE
EACH MISCELLANEOUS
Qty: 1 EACH | Refills: 0 | Status: SHIPPED | OUTPATIENT
Start: 2021-07-28

## 2021-07-28 RX ORDER — LANCETS
EACH MISCELLANEOUS
Qty: 100 EACH | Refills: 4 | Status: SHIPPED | OUTPATIENT
Start: 2021-07-28

## 2021-10-18 LAB
LEFT EYE DM RETINOPATHY: NEGATIVE
RIGHT EYE DM RETINOPATHY: NEGATIVE

## 2021-10-25 ENCOUNTER — PATIENT OUTREACH (OUTPATIENT)
Dept: ADMINISTRATIVE | Facility: HOSPITAL | Age: 51
End: 2021-10-25
Payer: MEDICARE

## 2021-10-25 DIAGNOSIS — Z12.31 BREAST CANCER SCREENING BY MAMMOGRAM: Primary | ICD-10-CM

## 2021-11-02 ENCOUNTER — TELEPHONE (OUTPATIENT)
Dept: ENDOCRINOLOGY | Facility: CLINIC | Age: 51
End: 2021-11-02
Payer: MEDICARE

## 2021-11-11 ENCOUNTER — LAB VISIT (OUTPATIENT)
Dept: LAB | Facility: HOSPITAL | Age: 51
End: 2021-11-11
Attending: HOSPITALIST
Payer: MEDICARE

## 2021-11-11 ENCOUNTER — OFFICE VISIT (OUTPATIENT)
Dept: FAMILY MEDICINE | Facility: CLINIC | Age: 51
End: 2021-11-11
Payer: MEDICARE

## 2021-11-11 VITALS
HEART RATE: 112 BPM | OXYGEN SATURATION: 99 % | DIASTOLIC BLOOD PRESSURE: 62 MMHG | HEIGHT: 64 IN | BODY MASS INDEX: 45.02 KG/M2 | SYSTOLIC BLOOD PRESSURE: 104 MMHG | WEIGHT: 263.69 LBS | TEMPERATURE: 99 F

## 2021-11-11 DIAGNOSIS — Z00.00 ANNUAL PHYSICAL EXAM: Primary | ICD-10-CM

## 2021-11-11 DIAGNOSIS — E55.9 VITAMIN D DEFICIENCY: ICD-10-CM

## 2021-11-11 DIAGNOSIS — E11.65 TYPE 2 DIABETES MELLITUS WITH HYPERGLYCEMIA, WITHOUT LONG-TERM CURRENT USE OF INSULIN: ICD-10-CM

## 2021-11-11 LAB
ALBUMIN SERPL BCP-MCNC: 3.8 G/DL (ref 3.5–5.2)
ALP SERPL-CCNC: 57 U/L (ref 55–135)
ALT SERPL W/O P-5'-P-CCNC: 19 U/L (ref 10–44)
ANION GAP SERPL CALC-SCNC: 10 MMOL/L (ref 8–16)
AST SERPL-CCNC: 34 U/L (ref 10–40)
BILIRUB SERPL-MCNC: 2.1 MG/DL (ref 0.1–1)
BUN SERPL-MCNC: 13 MG/DL (ref 6–20)
CALCIUM SERPL-MCNC: 9.3 MG/DL (ref 8.7–10.5)
CHLORIDE SERPL-SCNC: 103 MMOL/L (ref 95–110)
CO2 SERPL-SCNC: 26 MMOL/L (ref 23–29)
CREAT SERPL-MCNC: 0.9 MG/DL (ref 0.5–1.4)
EST. GFR  (AFRICAN AMERICAN): >60 ML/MIN/1.73 M^2
EST. GFR  (NON AFRICAN AMERICAN): >60 ML/MIN/1.73 M^2
GLUCOSE SERPL-MCNC: 220 MG/DL (ref 70–110)
POTASSIUM SERPL-SCNC: 4.3 MMOL/L (ref 3.5–5.1)
PROT SERPL-MCNC: 6.7 G/DL (ref 6–8.4)
SODIUM SERPL-SCNC: 139 MMOL/L (ref 136–145)

## 2021-11-11 PROCEDURE — 83036 HEMOGLOBIN GLYCOSYLATED A1C: CPT | Performed by: HOSPITALIST

## 2021-11-11 PROCEDURE — 80053 COMPREHEN METABOLIC PANEL: CPT | Performed by: HOSPITALIST

## 2021-11-11 PROCEDURE — 99999 PR PBB SHADOW E&M-EST. PATIENT-LVL IV: CPT | Mod: PBBFAC,,, | Performed by: INTERNAL MEDICINE

## 2021-11-11 PROCEDURE — 99214 OFFICE O/P EST MOD 30 MIN: CPT | Mod: S$PBB,,, | Performed by: INTERNAL MEDICINE

## 2021-11-11 PROCEDURE — 82306 VITAMIN D 25 HYDROXY: CPT | Performed by: HOSPITALIST

## 2021-11-11 PROCEDURE — 99999 PR PBB SHADOW E&M-EST. PATIENT-LVL IV: ICD-10-PCS | Mod: PBBFAC,,, | Performed by: INTERNAL MEDICINE

## 2021-11-11 PROCEDURE — 82570 ASSAY OF URINE CREATININE: CPT | Performed by: INTERNAL MEDICINE

## 2021-11-11 PROCEDURE — 99214 OFFICE O/P EST MOD 30 MIN: CPT | Mod: PBBFAC,PO | Performed by: INTERNAL MEDICINE

## 2021-11-11 PROCEDURE — 99214 PR OFFICE/OUTPT VISIT, EST, LEVL IV, 30-39 MIN: ICD-10-PCS | Mod: S$PBB,,, | Performed by: INTERNAL MEDICINE

## 2021-11-11 RX ORDER — EMPAGLIFLOZIN 25 MG/1
25 TABLET, FILM COATED ORAL DAILY
Qty: 90 TABLET | Refills: 0 | Status: SHIPPED | OUTPATIENT
Start: 2021-11-11 | End: 2022-01-27 | Stop reason: SDUPTHER

## 2021-11-12 LAB
25(OH)D3+25(OH)D2 SERPL-MCNC: 7 NG/ML (ref 30–96)
ALBUMIN/CREAT UR: 16.4 UG/MG (ref 0–30)
CREAT UR-MCNC: 195 MG/DL (ref 15–325)
ESTIMATED AVG GLUCOSE: 223 MG/DL (ref 68–131)
HBA1C MFR BLD: 9.4 % (ref 4–5.6)
MICROALBUMIN UR DL<=1MG/L-MCNC: 32 UG/ML

## 2021-11-18 ENCOUNTER — HOSPITAL ENCOUNTER (OUTPATIENT)
Dept: RADIOLOGY | Facility: HOSPITAL | Age: 51
Discharge: HOME | End: 2021-11-18
Attending: INTERNAL MEDICINE
Payer: MEDICARE

## 2021-11-18 DIAGNOSIS — Z12.31 BREAST CANCER SCREENING BY MAMMOGRAM: ICD-10-CM

## 2021-11-18 PROCEDURE — 77063 MAMMO DIGITAL SCREENING BILAT WITH TOMO: ICD-10-PCS | Mod: 26,,, | Performed by: RADIOLOGY

## 2021-11-18 PROCEDURE — 77067 SCR MAMMO BI INCL CAD: CPT | Mod: 26,,, | Performed by: RADIOLOGY

## 2021-11-18 PROCEDURE — 77067 SCR MAMMO BI INCL CAD: CPT | Mod: TC

## 2021-11-18 PROCEDURE — 77063 BREAST TOMOSYNTHESIS BI: CPT | Mod: 26,,, | Performed by: RADIOLOGY

## 2021-11-18 PROCEDURE — 77067 MAMMO DIGITAL SCREENING BILAT WITH TOMO: ICD-10-PCS | Mod: 26,,, | Performed by: RADIOLOGY

## 2021-11-22 ENCOUNTER — TELEPHONE (OUTPATIENT)
Dept: ENDOCRINOLOGY | Facility: CLINIC | Age: 51
End: 2021-11-22
Payer: MEDICARE

## 2021-12-14 ENCOUNTER — OFFICE VISIT (OUTPATIENT)
Dept: FAMILY MEDICINE | Facility: CLINIC | Age: 51
End: 2021-12-14
Payer: MEDICARE

## 2021-12-14 VITALS
HEART RATE: 110 BPM | WEIGHT: 258.19 LBS | DIASTOLIC BLOOD PRESSURE: 80 MMHG | OXYGEN SATURATION: 99 % | BODY MASS INDEX: 44.08 KG/M2 | TEMPERATURE: 99 F | SYSTOLIC BLOOD PRESSURE: 100 MMHG | HEIGHT: 64 IN

## 2021-12-14 DIAGNOSIS — R00.0 TACHYCARDIA: ICD-10-CM

## 2021-12-14 DIAGNOSIS — R06.02 SOB (SHORTNESS OF BREATH) ON EXERTION: Primary | ICD-10-CM

## 2021-12-14 PROCEDURE — 99214 OFFICE O/P EST MOD 30 MIN: CPT | Mod: S$PBB,,, | Performed by: FAMILY MEDICINE

## 2021-12-14 PROCEDURE — 99213 OFFICE O/P EST LOW 20 MIN: CPT | Mod: PBBFAC,PO | Performed by: FAMILY MEDICINE

## 2021-12-14 PROCEDURE — 93005 ELECTROCARDIOGRAM TRACING: CPT | Mod: PBBFAC,PO | Performed by: INTERNAL MEDICINE

## 2021-12-14 PROCEDURE — 93010 ELECTROCARDIOGRAM REPORT: CPT | Mod: S$PBB,,, | Performed by: INTERNAL MEDICINE

## 2021-12-14 PROCEDURE — 99999 PR PBB SHADOW E&M-EST. PATIENT-LVL III: CPT | Mod: PBBFAC,,, | Performed by: FAMILY MEDICINE

## 2021-12-14 PROCEDURE — 99999 PR PBB SHADOW E&M-EST. PATIENT-LVL III: ICD-10-PCS | Mod: PBBFAC,,, | Performed by: FAMILY MEDICINE

## 2021-12-14 PROCEDURE — 99214 PR OFFICE/OUTPT VISIT, EST, LEVL IV, 30-39 MIN: ICD-10-PCS | Mod: S$PBB,,, | Performed by: FAMILY MEDICINE

## 2021-12-14 PROCEDURE — 93010 EKG 12-LEAD: ICD-10-PCS | Mod: S$PBB,,, | Performed by: INTERNAL MEDICINE

## 2021-12-15 ENCOUNTER — LAB VISIT (OUTPATIENT)
Dept: LAB | Facility: HOSPITAL | Age: 51
End: 2021-12-15
Attending: FAMILY MEDICINE
Payer: MEDICARE

## 2021-12-15 ENCOUNTER — TELEPHONE (OUTPATIENT)
Dept: FAMILY MEDICINE | Facility: CLINIC | Age: 51
End: 2021-12-15
Payer: MEDICARE

## 2021-12-15 DIAGNOSIS — R06.02 SOB (SHORTNESS OF BREATH) ON EXERTION: ICD-10-CM

## 2021-12-15 DIAGNOSIS — R00.0 TACHYCARDIA: ICD-10-CM

## 2021-12-15 LAB
T4 FREE SERPL-MCNC: 0.95 NG/DL (ref 0.71–1.51)
TSH SERPL DL<=0.005 MIU/L-ACNC: 1.93 UIU/ML (ref 0.4–4)

## 2021-12-15 PROCEDURE — 84480 ASSAY TRIIODOTHYRONINE (T3): CPT | Performed by: FAMILY MEDICINE

## 2021-12-15 PROCEDURE — 84443 ASSAY THYROID STIM HORMONE: CPT | Performed by: FAMILY MEDICINE

## 2021-12-15 PROCEDURE — 36415 COLL VENOUS BLD VENIPUNCTURE: CPT | Mod: PO | Performed by: FAMILY MEDICINE

## 2021-12-15 PROCEDURE — 84439 ASSAY OF FREE THYROXINE: CPT | Performed by: FAMILY MEDICINE

## 2021-12-16 ENCOUNTER — TELEPHONE (OUTPATIENT)
Dept: FAMILY MEDICINE | Facility: CLINIC | Age: 51
End: 2021-12-16
Payer: MEDICARE

## 2021-12-16 ENCOUNTER — HOSPITAL ENCOUNTER (OUTPATIENT)
Facility: HOSPITAL | Age: 51
Discharge: HOME | End: 2021-12-17
Attending: EMERGENCY MEDICINE | Admitting: STUDENT IN AN ORGANIZED HEALTH CARE EDUCATION/TRAINING PROGRAM
Payer: MEDICARE

## 2021-12-16 DIAGNOSIS — D69.6 THROMBOCYTOPENIA: ICD-10-CM

## 2021-12-16 DIAGNOSIS — D51.9 ANEMIA DUE TO VITAMIN B12 DEFICIENCY, UNSPECIFIED B12 DEFICIENCY TYPE: ICD-10-CM

## 2021-12-16 DIAGNOSIS — D64.9 ANEMIA, UNSPECIFIED TYPE: Primary | ICD-10-CM

## 2021-12-16 DIAGNOSIS — D64.9 SEVERE ANEMIA: Primary | ICD-10-CM

## 2021-12-16 DIAGNOSIS — R07.9 CHEST PAIN: ICD-10-CM

## 2021-12-16 DIAGNOSIS — D53.9 MACROCYTIC ANEMIA: ICD-10-CM

## 2021-12-16 DIAGNOSIS — E53.8 VITAMIN B12 DEFICIENCY: ICD-10-CM

## 2021-12-16 DIAGNOSIS — R06.02 SHORTNESS OF BREATH: ICD-10-CM

## 2021-12-16 PROBLEM — I10 ESSENTIAL HYPERTENSION: Status: ACTIVE | Noted: 2021-12-16

## 2021-12-16 LAB
ABO + RH BLD: NORMAL
ALBUMIN SERPL BCP-MCNC: 3.4 G/DL (ref 3.5–5.2)
ALP SERPL-CCNC: 57 U/L (ref 55–135)
ALT SERPL W/O P-5'-P-CCNC: 16 U/L (ref 10–44)
ANION GAP SERPL CALC-SCNC: 6 MMOL/L (ref 8–16)
ANISOCYTOSIS BLD QL SMEAR: ABNORMAL
ANISOCYTOSIS BLD QL SMEAR: SLIGHT
APTT BLDCRRT: <21 SEC (ref 21–32)
AST SERPL-CCNC: 39 U/L (ref 10–40)
BASOPHILS # BLD AUTO: 0.02 K/UL (ref 0–0.2)
BASOPHILS # BLD AUTO: ABNORMAL K/UL (ref 0–0.2)
BASOPHILS NFR BLD: 0 % (ref 0–1.9)
BASOPHILS NFR BLD: 0.3 % (ref 0–1.9)
BILIRUB SERPL-MCNC: 2.5 MG/DL (ref 0.1–1)
BLD GP AB SCN CELLS X3 SERPL QL: NORMAL
BLD PROD TYP BPU: NORMAL
BLOOD UNIT EXPIRATION DATE: NORMAL
BLOOD UNIT TYPE CODE: 6200
BLOOD UNIT TYPE: NORMAL
BNP SERPL-MCNC: 11 PG/ML (ref 0–99)
BUN SERPL-MCNC: 10 MG/DL (ref 6–20)
CALCIUM SERPL-MCNC: 8.6 MG/DL (ref 8.7–10.5)
CHLORIDE SERPL-SCNC: 106 MMOL/L (ref 95–110)
CO2 SERPL-SCNC: 24 MMOL/L (ref 23–29)
CODING SYSTEM: NORMAL
CREAT SERPL-MCNC: 0.9 MG/DL (ref 0.5–1.4)
CTP QC/QA: YES
DACRYOCYTES BLD QL SMEAR: ABNORMAL
DACRYOCYTES BLD QL SMEAR: ABNORMAL
DAT IGG-SP REAG RBC-IMP: NORMAL
DIFFERENTIAL METHOD: ABNORMAL
DIFFERENTIAL METHOD: ABNORMAL
DISPENSE STATUS: NORMAL
EOSINOPHIL # BLD AUTO: 0.1 K/UL (ref 0–0.5)
EOSINOPHIL # BLD AUTO: ABNORMAL K/UL (ref 0–0.5)
EOSINOPHIL NFR BLD: 0 % (ref 0–8)
EOSINOPHIL NFR BLD: 2.1 % (ref 0–8)
ERYTHROCYTE [DISTWIDTH] IN BLOOD BY AUTOMATED COUNT: 21.7 % (ref 11.5–14.5)
ERYTHROCYTE [DISTWIDTH] IN BLOOD BY AUTOMATED COUNT: ABNORMAL % (ref 11.5–14.5)
EST. GFR  (AFRICAN AMERICAN): >60 ML/MIN/1.73 M^2
EST. GFR  (NON AFRICAN AMERICAN): >60 ML/MIN/1.73 M^2
FIBRINOGEN PPP-MCNC: 431 MG/DL (ref 182–400)
GLUCOSE SERPL-MCNC: 235 MG/DL (ref 70–110)
HCT VFR BLD AUTO: 14.1 % (ref 37–48.5)
HCT VFR BLD AUTO: 18.8 % (ref 37–48.5)
HGB BLD-MCNC: 4.5 G/DL (ref 12–16)
HGB BLD-MCNC: 5.9 G/DL (ref 12–16)
HIV1+2 IGG SERPL QL IA.RAPID: NORMAL
HYPOCHROMIA BLD QL SMEAR: ABNORMAL
IMM GRANULOCYTES # BLD AUTO: 0.11 K/UL (ref 0–0.04)
IMM GRANULOCYTES # BLD AUTO: ABNORMAL K/UL (ref 0–0.04)
IMM GRANULOCYTES NFR BLD AUTO: 1.9 % (ref 0–0.5)
IMM GRANULOCYTES NFR BLD AUTO: ABNORMAL % (ref 0–0.5)
INR PPP: 1 (ref 0.8–1.2)
IRON SERPL-MCNC: 70 UG/DL (ref 30–160)
LDH SERPL L TO P-CCNC: 3875 U/L (ref 110–260)
LYMPHOCYTES # BLD AUTO: 2.1 K/UL (ref 1–4.8)
LYMPHOCYTES # BLD AUTO: ABNORMAL K/UL (ref 1–4.8)
LYMPHOCYTES NFR BLD: 36.4 % (ref 18–48)
LYMPHOCYTES NFR BLD: 41 % (ref 18–48)
MCH RBC QN AUTO: 35.1 PG (ref 27–31)
MCH RBC QN AUTO: 37.8 PG (ref 27–31)
MCHC RBC AUTO-ENTMCNC: 31.4 G/DL (ref 32–36)
MCHC RBC AUTO-ENTMCNC: 31.9 G/DL (ref 32–36)
MCV RBC AUTO: 112 FL (ref 82–98)
MCV RBC AUTO: 119 FL (ref 82–98)
MONOCYTES # BLD AUTO: 0.4 K/UL (ref 0.3–1)
MONOCYTES # BLD AUTO: ABNORMAL K/UL (ref 0.3–1)
MONOCYTES NFR BLD: 1 % (ref 4–15)
MONOCYTES NFR BLD: 6.7 % (ref 4–15)
NEUTROPHILS # BLD AUTO: 3.1 K/UL (ref 1.8–7.7)
NEUTROPHILS # BLD AUTO: ABNORMAL K/UL (ref 1.8–7.7)
NEUTROPHILS NFR BLD: 52.6 % (ref 38–73)
NEUTROPHILS NFR BLD: 58 % (ref 38–73)
NRBC BLD-RTO: 3 /100 WBC
NRBC BLD-RTO: 4 /100 WBC
OVALOCYTES BLD QL SMEAR: ABNORMAL
PATH REV BLD -IMP: NORMAL
PLATELET # BLD AUTO: 54 K/UL (ref 150–450)
PLATELET # BLD AUTO: 58 K/UL (ref 150–450)
PLATELET BLD QL SMEAR: ABNORMAL
PMV BLD AUTO: ABNORMAL FL (ref 9.2–12.9)
PMV BLD AUTO: ABNORMAL FL (ref 9.2–12.9)
POCT GLUCOSE: 245 MG/DL (ref 70–110)
POCT GLUCOSE: 297 MG/DL (ref 70–110)
POIKILOCYTOSIS BLD QL SMEAR: ABNORMAL
POIKILOCYTOSIS BLD QL SMEAR: ABNORMAL
POLYCHROMASIA BLD QL SMEAR: ABNORMAL
POTASSIUM SERPL-SCNC: 4.7 MMOL/L (ref 3.5–5.1)
PROT SERPL-MCNC: 6.5 G/DL (ref 6–8.4)
PROTHROMBIN TIME: 10.9 SEC (ref 9–12.5)
RBC # BLD AUTO: 1.19 M/UL (ref 4–5.4)
RBC # BLD AUTO: 1.68 M/UL (ref 4–5.4)
RETICS/RBC NFR AUTO: 2.2 % (ref 0.5–2.5)
SARS-COV-2 RDRP RESP QL NAA+PROBE: NEGATIVE
SATURATED IRON: 28 % (ref 20–50)
SCHISTOCYTES BLD QL SMEAR: ABNORMAL
SODIUM SERPL-SCNC: 136 MMOL/L (ref 136–145)
T3 SERPL-MCNC: 67 NG/DL (ref 60–180)
TOTAL IRON BINDING CAPACITY: 252 UG/DL (ref 250–450)
TRANS ERYTHROCYTES VOL PATIENT: NORMAL ML
TRANSFERRIN SERPL-MCNC: 170 MG/DL (ref 200–375)
TROPONIN I SERPL DL<=0.01 NG/ML-MCNC: <0.006 NG/ML (ref 0–0.03)
WBC # BLD AUTO: 5.83 K/UL (ref 3.9–12.7)
WBC # BLD AUTO: 7.25 K/UL (ref 3.9–12.7)

## 2021-12-16 PROCEDURE — 25000003 PHARM REV CODE 250: Performed by: PHYSICIAN ASSISTANT

## 2021-12-16 PROCEDURE — 85045 AUTOMATED RETICULOCYTE COUNT: CPT | Performed by: EMERGENCY MEDICINE

## 2021-12-16 PROCEDURE — 93010 EKG 12-LEAD: ICD-10-PCS | Mod: ,,, | Performed by: INTERNAL MEDICINE

## 2021-12-16 PROCEDURE — 85060 PATHOLOGIST REVIEW: ICD-10-PCS | Mod: ,,, | Performed by: PATHOLOGY

## 2021-12-16 PROCEDURE — G0378 HOSPITAL OBSERVATION PER HR: HCPCS

## 2021-12-16 PROCEDURE — 80053 COMPREHEN METABOLIC PANEL: CPT | Performed by: EMERGENCY MEDICINE

## 2021-12-16 PROCEDURE — 83615 LACTATE (LD) (LDH) ENZYME: CPT | Performed by: PHYSICIAN ASSISTANT

## 2021-12-16 PROCEDURE — 83880 ASSAY OF NATRIURETIC PEPTIDE: CPT | Performed by: EMERGENCY MEDICINE

## 2021-12-16 PROCEDURE — 83921 ORGANIC ACID SINGLE QUANT: CPT | Performed by: PHYSICIAN ASSISTANT

## 2021-12-16 PROCEDURE — 84484 ASSAY OF TROPONIN QUANT: CPT | Performed by: EMERGENCY MEDICINE

## 2021-12-16 PROCEDURE — 85025 COMPLETE CBC W/AUTO DIFF WBC: CPT | Mod: 91 | Performed by: EMERGENCY MEDICINE

## 2021-12-16 PROCEDURE — 96372 THER/PROPH/DIAG INJ SC/IM: CPT | Mod: 59

## 2021-12-16 PROCEDURE — 93010 ELECTROCARDIOGRAM REPORT: CPT | Mod: ,,, | Performed by: INTERNAL MEDICINE

## 2021-12-16 PROCEDURE — 85730 THROMBOPLASTIN TIME PARTIAL: CPT | Performed by: EMERGENCY MEDICINE

## 2021-12-16 PROCEDURE — P9021 RED BLOOD CELLS UNIT: HCPCS | Performed by: EMERGENCY MEDICINE

## 2021-12-16 PROCEDURE — U0002 COVID-19 LAB TEST NON-CDC: HCPCS | Performed by: EMERGENCY MEDICINE

## 2021-12-16 PROCEDURE — 82962 GLUCOSE BLOOD TEST: CPT

## 2021-12-16 PROCEDURE — 85384 FIBRINOGEN ACTIVITY: CPT | Performed by: PHYSICIAN ASSISTANT

## 2021-12-16 PROCEDURE — 86900 BLOOD TYPING SEROLOGIC ABO: CPT | Performed by: EMERGENCY MEDICINE

## 2021-12-16 PROCEDURE — 82607 VITAMIN B-12: CPT | Performed by: EMERGENCY MEDICINE

## 2021-12-16 PROCEDURE — 85007 BL SMEAR W/DIFF WBC COUNT: CPT | Mod: NCS | Performed by: PHYSICIAN ASSISTANT

## 2021-12-16 PROCEDURE — 93005 ELECTROCARDIOGRAM TRACING: CPT

## 2021-12-16 PROCEDURE — 36415 COLL VENOUS BLD VENIPUNCTURE: CPT | Performed by: PHYSICIAN ASSISTANT

## 2021-12-16 PROCEDURE — 82746 ASSAY OF FOLIC ACID SERUM: CPT | Performed by: EMERGENCY MEDICINE

## 2021-12-16 PROCEDURE — 99291 CRITICAL CARE FIRST HOUR: CPT | Mod: 25,CS

## 2021-12-16 PROCEDURE — 80074 ACUTE HEPATITIS PANEL: CPT | Performed by: PHYSICIAN ASSISTANT

## 2021-12-16 PROCEDURE — 86703 HIV-1/HIV-2 1 RESULT ANTBDY: CPT | Performed by: PHYSICIAN ASSISTANT

## 2021-12-16 PROCEDURE — 83010 ASSAY OF HAPTOGLOBIN QUANT: CPT | Performed by: PHYSICIAN ASSISTANT

## 2021-12-16 PROCEDURE — 85027 COMPLETE CBC AUTOMATED: CPT | Performed by: PHYSICIAN ASSISTANT

## 2021-12-16 PROCEDURE — 84466 ASSAY OF TRANSFERRIN: CPT | Performed by: EMERGENCY MEDICINE

## 2021-12-16 PROCEDURE — 36430 TRANSFUSION BLD/BLD COMPNT: CPT

## 2021-12-16 PROCEDURE — 86340 INTRINSIC FACTOR ANTIBODY: CPT | Performed by: PHYSICIAN ASSISTANT

## 2021-12-16 PROCEDURE — 86256 FLUORESCENT ANTIBODY TITER: CPT | Performed by: PHYSICIAN ASSISTANT

## 2021-12-16 PROCEDURE — C9399 UNCLASSIFIED DRUGS OR BIOLOG: HCPCS | Performed by: PHYSICIAN ASSISTANT

## 2021-12-16 PROCEDURE — 86038 ANTINUCLEAR ANTIBODIES: CPT | Performed by: PHYSICIAN ASSISTANT

## 2021-12-16 PROCEDURE — 86920 COMPATIBILITY TEST SPIN: CPT | Performed by: EMERGENCY MEDICINE

## 2021-12-16 PROCEDURE — 85610 PROTHROMBIN TIME: CPT | Performed by: EMERGENCY MEDICINE

## 2021-12-16 PROCEDURE — 86880 COOMBS TEST DIRECT: CPT | Performed by: PHYSICIAN ASSISTANT

## 2021-12-16 PROCEDURE — 85060 BLOOD SMEAR INTERPRETATION: CPT | Mod: ,,, | Performed by: PATHOLOGY

## 2021-12-16 RX ORDER — IBUPROFEN 200 MG
24 TABLET ORAL
Status: DISCONTINUED | OUTPATIENT
Start: 2021-12-16 | End: 2021-12-17 | Stop reason: HOSPADM

## 2021-12-16 RX ORDER — LANOLIN ALCOHOL/MO/W.PET/CERES
800 CREAM (GRAM) TOPICAL
Status: DISCONTINUED | OUTPATIENT
Start: 2021-12-16 | End: 2021-12-17 | Stop reason: HOSPADM

## 2021-12-16 RX ORDER — INSULIN ASPART 100 [IU]/ML
0-5 INJECTION, SOLUTION INTRAVENOUS; SUBCUTANEOUS
Status: DISCONTINUED | OUTPATIENT
Start: 2021-12-16 | End: 2021-12-17 | Stop reason: HOSPADM

## 2021-12-16 RX ORDER — CYANOCOBALAMIN 1000 UG/ML
1000 INJECTION, SOLUTION INTRAMUSCULAR; SUBCUTANEOUS ONCE
Status: COMPLETED | OUTPATIENT
Start: 2021-12-17 | End: 2021-12-16

## 2021-12-16 RX ORDER — HYDROCODONE BITARTRATE AND ACETAMINOPHEN 500; 5 MG/1; MG/1
TABLET ORAL
Status: DISCONTINUED | OUTPATIENT
Start: 2021-12-16 | End: 2021-12-17 | Stop reason: HOSPADM

## 2021-12-16 RX ORDER — NALOXONE HCL 0.4 MG/ML
0.02 VIAL (ML) INJECTION
Status: DISCONTINUED | OUTPATIENT
Start: 2021-12-16 | End: 2021-12-17 | Stop reason: HOSPADM

## 2021-12-16 RX ORDER — TALC
6 POWDER (GRAM) TOPICAL NIGHTLY PRN
Status: DISCONTINUED | OUTPATIENT
Start: 2021-12-16 | End: 2021-12-17 | Stop reason: HOSPADM

## 2021-12-16 RX ORDER — IBUPROFEN 200 MG
16 TABLET ORAL
Status: DISCONTINUED | OUTPATIENT
Start: 2021-12-16 | End: 2021-12-17 | Stop reason: HOSPADM

## 2021-12-16 RX ORDER — AMOXICILLIN 250 MG
1 CAPSULE ORAL DAILY PRN
Status: DISCONTINUED | OUTPATIENT
Start: 2021-12-16 | End: 2021-12-17 | Stop reason: HOSPADM

## 2021-12-16 RX ORDER — ACETAMINOPHEN 325 MG/1
650 TABLET ORAL EVERY 4 HOURS PRN
Status: DISCONTINUED | OUTPATIENT
Start: 2021-12-16 | End: 2021-12-17 | Stop reason: HOSPADM

## 2021-12-16 RX ORDER — SODIUM CHLORIDE 0.9 % (FLUSH) 0.9 %
10 SYRINGE (ML) INJECTION
Status: DISCONTINUED | OUTPATIENT
Start: 2021-12-16 | End: 2021-12-17 | Stop reason: HOSPADM

## 2021-12-16 RX ORDER — AMOXICILLIN 250 MG
1 CAPSULE ORAL 2 TIMES DAILY
Status: DISCONTINUED | OUTPATIENT
Start: 2021-12-16 | End: 2021-12-16

## 2021-12-16 RX ORDER — PRAVASTATIN SODIUM 40 MG/1
40 TABLET ORAL DAILY
Status: DISCONTINUED | OUTPATIENT
Start: 2021-12-17 | End: 2021-12-17 | Stop reason: HOSPADM

## 2021-12-16 RX ORDER — GLUCAGON 1 MG
1 KIT INJECTION
Status: DISCONTINUED | OUTPATIENT
Start: 2021-12-16 | End: 2021-12-17 | Stop reason: HOSPADM

## 2021-12-16 RX ORDER — SODIUM CHLORIDE 0.9 % (FLUSH) 0.9 %
10 SYRINGE (ML) INJECTION EVERY 8 HOURS
Status: DISCONTINUED | OUTPATIENT
Start: 2021-12-16 | End: 2021-12-16

## 2021-12-16 RX ADMIN — INSULIN DETEMIR 8 UNITS: 100 INJECTION, SOLUTION SUBCUTANEOUS at 10:12

## 2021-12-16 RX ADMIN — ACETAMINOPHEN 650 MG: 325 TABLET ORAL at 09:12

## 2021-12-16 RX ADMIN — CYANOCOBALAMIN 1000 MCG: 1000 INJECTION, SOLUTION INTRAMUSCULAR at 11:12

## 2021-12-17 VITALS
HEIGHT: 64 IN | OXYGEN SATURATION: 96 % | HEART RATE: 84 BPM | WEIGHT: 255 LBS | BODY MASS INDEX: 43.54 KG/M2 | RESPIRATION RATE: 20 BRPM | SYSTOLIC BLOOD PRESSURE: 101 MMHG | TEMPERATURE: 98 F | DIASTOLIC BLOOD PRESSURE: 55 MMHG

## 2021-12-17 PROBLEM — D51.9 B12 DEFICIENCY ANEMIA: Status: ACTIVE | Noted: 2021-12-17

## 2021-12-17 LAB
ANION GAP SERPL CALC-SCNC: 8 MMOL/L (ref 8–16)
BASOPHILS # BLD AUTO: 0.03 K/UL (ref 0–0.2)
BASOPHILS NFR BLD: 0.4 % (ref 0–1.9)
BUN SERPL-MCNC: 12 MG/DL (ref 6–20)
CALCIUM SERPL-MCNC: 8.4 MG/DL (ref 8.7–10.5)
CHLORIDE SERPL-SCNC: 105 MMOL/L (ref 95–110)
CO2 SERPL-SCNC: 23 MMOL/L (ref 23–29)
CREAT SERPL-MCNC: 0.9 MG/DL (ref 0.5–1.4)
DIFFERENTIAL METHOD: ABNORMAL
EOSINOPHIL # BLD AUTO: 0.1 K/UL (ref 0–0.5)
EOSINOPHIL NFR BLD: 1.6 % (ref 0–8)
ERYTHROCYTE [DISTWIDTH] IN BLOOD BY AUTOMATED COUNT: 24 % (ref 11.5–14.5)
EST. GFR  (AFRICAN AMERICAN): >60 ML/MIN/1.73 M^2
EST. GFR  (NON AFRICAN AMERICAN): >60 ML/MIN/1.73 M^2
FOLATE SERPL-MCNC: 6.5 NG/ML (ref 4–24)
GLUCOSE SERPL-MCNC: 160 MG/DL (ref 70–110)
HAPTOGLOB SERPL-MCNC: <10 MG/DL (ref 30–250)
HAV IGM SERPL QL IA: NEGATIVE
HBV CORE IGM SERPL QL IA: NEGATIVE
HBV SURFACE AG SERPL QL IA: NEGATIVE
HCT VFR BLD AUTO: 22.8 % (ref 37–48.5)
HCV AB SERPL QL IA: NEGATIVE
HGB BLD-MCNC: 7.5 G/DL (ref 12–16)
IMM GRANULOCYTES # BLD AUTO: 0.13 K/UL (ref 0–0.04)
IMM GRANULOCYTES NFR BLD AUTO: 1.9 % (ref 0–0.5)
LYMPHOCYTES # BLD AUTO: 2.2 K/UL (ref 1–4.8)
LYMPHOCYTES NFR BLD: 32.8 % (ref 18–48)
MCH RBC QN AUTO: 34.2 PG (ref 27–31)
MCHC RBC AUTO-ENTMCNC: 32.9 G/DL (ref 32–36)
MCV RBC AUTO: 104 FL (ref 82–98)
MONOCYTES # BLD AUTO: 0.4 K/UL (ref 0.3–1)
MONOCYTES NFR BLD: 6.6 % (ref 4–15)
NEUTROPHILS # BLD AUTO: 3.8 K/UL (ref 1.8–7.7)
NEUTROPHILS NFR BLD: 56.7 % (ref 38–73)
NRBC BLD-RTO: 3 /100 WBC
PLATELET # BLD AUTO: 45 K/UL (ref 150–450)
PMV BLD AUTO: ABNORMAL FL (ref 9.2–12.9)
POCT GLUCOSE: 184 MG/DL (ref 70–110)
POCT GLUCOSE: 190 MG/DL (ref 70–110)
POCT GLUCOSE: 228 MG/DL (ref 70–110)
POTASSIUM SERPL-SCNC: 4.4 MMOL/L (ref 3.5–5.1)
RBC # BLD AUTO: 2.19 M/UL (ref 4–5.4)
SODIUM SERPL-SCNC: 136 MMOL/L (ref 136–145)
VIT B12 SERPL-MCNC: <148 PG/ML (ref 210–950)
WBC # BLD AUTO: 6.68 K/UL (ref 3.9–12.7)

## 2021-12-17 PROCEDURE — 25000003 PHARM REV CODE 250: Performed by: PHYSICIAN ASSISTANT

## 2021-12-17 PROCEDURE — 25000003 PHARM REV CODE 250: Performed by: NURSE PRACTITIONER

## 2021-12-17 PROCEDURE — 96372 THER/PROPH/DIAG INJ SC/IM: CPT | Mod: 59

## 2021-12-17 PROCEDURE — 80048 BASIC METABOLIC PNL TOTAL CA: CPT | Performed by: PHYSICIAN ASSISTANT

## 2021-12-17 PROCEDURE — 85025 COMPLETE CBC W/AUTO DIFF WBC: CPT | Performed by: PHYSICIAN ASSISTANT

## 2021-12-17 PROCEDURE — G0378 HOSPITAL OBSERVATION PER HR: HCPCS

## 2021-12-17 PROCEDURE — 63600175 PHARM REV CODE 636 W HCPCS: Performed by: PHYSICIAN ASSISTANT

## 2021-12-17 PROCEDURE — 99215 PR OFFICE/OUTPT VISIT, EST, LEVL V, 40-54 MIN: ICD-10-PCS | Mod: ,,, | Performed by: STUDENT IN AN ORGANIZED HEALTH CARE EDUCATION/TRAINING PROGRAM

## 2021-12-17 PROCEDURE — 36415 COLL VENOUS BLD VENIPUNCTURE: CPT | Performed by: PHYSICIAN ASSISTANT

## 2021-12-17 PROCEDURE — 99215 OFFICE O/P EST HI 40 MIN: CPT | Mod: ,,, | Performed by: STUDENT IN AN ORGANIZED HEALTH CARE EDUCATION/TRAINING PROGRAM

## 2021-12-17 RX ORDER — BENZONATATE 100 MG/1
100 CAPSULE ORAL 3 TIMES DAILY PRN
Status: DISCONTINUED | OUTPATIENT
Start: 2021-12-17 | End: 2021-12-17 | Stop reason: HOSPADM

## 2021-12-17 RX ORDER — CYANOCOBALAMIN 1000 UG/ML
1000 INJECTION, SOLUTION INTRAMUSCULAR; SUBCUTANEOUS DAILY
Status: DISCONTINUED | OUTPATIENT
Start: 2021-12-17 | End: 2021-12-17 | Stop reason: HOSPADM

## 2021-12-17 RX ORDER — CYANOCOBALAMIN 1000 UG/ML
1000 INJECTION, SOLUTION INTRAMUSCULAR; SUBCUTANEOUS DAILY
Qty: 15 ML | Refills: 0 | Status: SHIPPED | OUTPATIENT
Start: 2021-12-17 | End: 2022-01-06 | Stop reason: SDUPTHER

## 2021-12-17 RX ADMIN — INSULIN ASPART 2 UNITS: 100 INJECTION, SOLUTION INTRAVENOUS; SUBCUTANEOUS at 11:12

## 2021-12-17 RX ADMIN — PRAVASTATIN SODIUM 40 MG: 40 TABLET ORAL at 08:12

## 2021-12-17 RX ADMIN — BENZONATATE 100 MG: 100 CAPSULE ORAL at 03:12

## 2021-12-18 ENCOUNTER — HOSPITAL ENCOUNTER (OUTPATIENT)
Facility: HOSPITAL | Age: 51
Discharge: HOME | End: 2021-12-20
Attending: EMERGENCY MEDICINE | Admitting: EMERGENCY MEDICINE
Payer: MEDICARE

## 2021-12-18 DIAGNOSIS — D53.9 MACROCYTIC ANEMIA: ICD-10-CM

## 2021-12-18 DIAGNOSIS — D51.9 ANEMIA DUE TO VITAMIN B12 DEFICIENCY, UNSPECIFIED B12 DEFICIENCY TYPE: ICD-10-CM

## 2021-12-18 DIAGNOSIS — D69.6 THROMBOCYTOPENIA: ICD-10-CM

## 2021-12-18 DIAGNOSIS — E53.8 VITAMIN B12 DEFICIENCY: ICD-10-CM

## 2021-12-18 DIAGNOSIS — D64.9 SEVERE ANEMIA: ICD-10-CM

## 2021-12-18 DIAGNOSIS — D64.9 ANEMIA: Primary | ICD-10-CM

## 2021-12-18 DIAGNOSIS — R07.9 CHEST PAIN: ICD-10-CM

## 2021-12-18 LAB
ALBUMIN SERPL BCP-MCNC: 3.5 G/DL (ref 3.5–5.2)
ALP SERPL-CCNC: 53 U/L (ref 55–135)
ALT SERPL W/O P-5'-P-CCNC: 15 U/L (ref 10–44)
ANION GAP SERPL CALC-SCNC: 8 MMOL/L (ref 8–16)
ANISOCYTOSIS BLD QL SMEAR: SLIGHT
APTT BLDCRRT: 22.9 SEC (ref 21–32)
AST SERPL-CCNC: 21 U/L (ref 10–40)
BASOPHILS # BLD AUTO: 0.05 K/UL (ref 0–0.2)
BASOPHILS NFR BLD: 0.7 % (ref 0–1.9)
BILIRUB SERPL-MCNC: 2.1 MG/DL (ref 0.1–1)
BUN SERPL-MCNC: 10 MG/DL (ref 6–20)
CALCIUM SERPL-MCNC: 8.4 MG/DL (ref 8.7–10.5)
CHLORIDE SERPL-SCNC: 106 MMOL/L (ref 95–110)
CO2 SERPL-SCNC: 23 MMOL/L (ref 23–29)
CREAT SERPL-MCNC: 0.9 MG/DL (ref 0.5–1.4)
CTP QC/QA: YES
D DIMER PPP IA.FEU-MCNC: 1.86 MG/L FEU
DAT IGG-SP REAG RBC-IMP: NORMAL
DIFFERENTIAL METHOD: ABNORMAL
EOSINOPHIL # BLD AUTO: 0.3 K/UL (ref 0–0.5)
EOSINOPHIL NFR BLD: 4.3 % (ref 0–8)
ERYTHROCYTE [DISTWIDTH] IN BLOOD BY AUTOMATED COUNT: 24.3 % (ref 11.5–14.5)
EST. GFR  (AFRICAN AMERICAN): >60 ML/MIN/1.73 M^2
EST. GFR  (NON AFRICAN AMERICAN): >60 ML/MIN/1.73 M^2
FIBRINOGEN PPP-MCNC: 451 MG/DL (ref 182–400)
GLUCOSE SERPL-MCNC: 123 MG/DL (ref 70–110)
HCT VFR BLD AUTO: 24.6 % (ref 37–48.5)
HGB BLD-MCNC: 7.9 G/DL (ref 12–16)
IMM GRANULOCYTES # BLD AUTO: 0.13 K/UL (ref 0–0.04)
IMM GRANULOCYTES NFR BLD AUTO: 1.7 % (ref 0–0.5)
INR PPP: 1 (ref 0.8–1.2)
LDH SERPL L TO P-CCNC: 3240 U/L (ref 110–260)
LYMPHOCYTES # BLD AUTO: 3.7 K/UL (ref 1–4.8)
LYMPHOCYTES NFR BLD: 48.8 % (ref 18–48)
MAGNESIUM SERPL-MCNC: 2.1 MG/DL (ref 1.6–2.6)
MCH RBC QN AUTO: 34.3 PG (ref 27–31)
MCHC RBC AUTO-ENTMCNC: 32.1 G/DL (ref 32–36)
MCV RBC AUTO: 107 FL (ref 82–98)
MONOCYTES # BLD AUTO: 0.6 K/UL (ref 0.3–1)
MONOCYTES NFR BLD: 7.9 % (ref 4–15)
NEUTROPHILS # BLD AUTO: 2.8 K/UL (ref 1.8–7.7)
NEUTROPHILS NFR BLD: 36.6 % (ref 38–73)
NRBC BLD-RTO: 16 /100 WBC
PLATELET # BLD AUTO: 44 K/UL (ref 150–450)
PLATELET BLD QL SMEAR: ABNORMAL
PMV BLD AUTO: ABNORMAL FL (ref 9.2–12.9)
POIKILOCYTOSIS BLD QL SMEAR: SLIGHT
POLYCHROMASIA BLD QL SMEAR: ABNORMAL
POTASSIUM SERPL-SCNC: 4 MMOL/L (ref 3.5–5.1)
PROT SERPL-MCNC: 6.7 G/DL (ref 6–8.4)
PROTHROMBIN TIME: 10.5 SEC (ref 9–12.5)
RBC # BLD AUTO: 2.3 M/UL (ref 4–5.4)
SARS-COV-2 RDRP RESP QL NAA+PROBE: NEGATIVE
SODIUM SERPL-SCNC: 137 MMOL/L (ref 136–145)
TSH SERPL DL<=0.005 MIU/L-ACNC: 2.93 UIU/ML (ref 0.4–4)
WBC # BLD AUTO: 7.62 K/UL (ref 3.9–12.7)

## 2021-12-18 PROCEDURE — U0002 COVID-19 LAB TEST NON-CDC: HCPCS | Performed by: EMERGENCY MEDICINE

## 2021-12-18 PROCEDURE — 86038 ANTINUCLEAR ANTIBODIES: CPT | Performed by: EMERGENCY MEDICINE

## 2021-12-18 PROCEDURE — 85025 COMPLETE CBC W/AUTO DIFF WBC: CPT | Performed by: EMERGENCY MEDICINE

## 2021-12-18 PROCEDURE — 93010 ELECTROCARDIOGRAM REPORT: CPT | Mod: ,,, | Performed by: INTERNAL MEDICINE

## 2021-12-18 PROCEDURE — 93005 ELECTROCARDIOGRAM TRACING: CPT

## 2021-12-18 PROCEDURE — 85384 FIBRINOGEN ACTIVITY: CPT | Performed by: EMERGENCY MEDICINE

## 2021-12-18 PROCEDURE — 99285 EMERGENCY DEPT VISIT HI MDM: CPT | Mod: 25,CS

## 2021-12-18 PROCEDURE — 84443 ASSAY THYROID STIM HORMONE: CPT | Performed by: EMERGENCY MEDICINE

## 2021-12-18 PROCEDURE — 80053 COMPREHEN METABOLIC PANEL: CPT | Performed by: EMERGENCY MEDICINE

## 2021-12-18 PROCEDURE — 85730 THROMBOPLASTIN TIME PARTIAL: CPT | Performed by: EMERGENCY MEDICINE

## 2021-12-18 PROCEDURE — 83010 ASSAY OF HAPTOGLOBIN QUANT: CPT | Performed by: EMERGENCY MEDICINE

## 2021-12-18 PROCEDURE — G0378 HOSPITAL OBSERVATION PER HR: HCPCS

## 2021-12-18 PROCEDURE — 85610 PROTHROMBIN TIME: CPT | Performed by: EMERGENCY MEDICINE

## 2021-12-18 PROCEDURE — 85379 FIBRIN DEGRADATION QUANT: CPT | Performed by: EMERGENCY MEDICINE

## 2021-12-18 PROCEDURE — 83735 ASSAY OF MAGNESIUM: CPT | Performed by: EMERGENCY MEDICINE

## 2021-12-18 PROCEDURE — 83615 LACTATE (LD) (LDH) ENZYME: CPT | Performed by: EMERGENCY MEDICINE

## 2021-12-18 PROCEDURE — 93010 EKG 12-LEAD: ICD-10-PCS | Mod: ,,, | Performed by: INTERNAL MEDICINE

## 2021-12-18 PROCEDURE — 86880 COOMBS TEST DIRECT: CPT | Performed by: EMERGENCY MEDICINE

## 2021-12-19 PROBLEM — D69.6 THROMBOCYTOPENIA: Status: ACTIVE | Noted: 2021-12-19

## 2021-12-19 LAB
ABO GROUP BLD: NORMAL
ANISOCYTOSIS BLD QL SMEAR: SLIGHT
BASOPHILS # BLD AUTO: 0.05 K/UL (ref 0–0.2)
BASOPHILS NFR BLD: 0.6 % (ref 0–1.9)
BLD GP AB SCN CELLS X3 SERPL QL: NORMAL
DACRYOCYTES BLD QL SMEAR: ABNORMAL
DIFFERENTIAL METHOD: ABNORMAL
EOSINOPHIL # BLD AUTO: 0.4 K/UL (ref 0–0.5)
EOSINOPHIL NFR BLD: 5.1 % (ref 0–8)
ERYTHROCYTE [DISTWIDTH] IN BLOOD BY AUTOMATED COUNT: 24.2 % (ref 11.5–14.5)
HAPTOGLOB SERPL-MCNC: <10 MG/DL (ref 30–250)
HCT VFR BLD AUTO: 23.4 % (ref 37–48.5)
HGB BLD-MCNC: 7.3 G/DL (ref 12–16)
HYPOCHROMIA BLD QL SMEAR: ABNORMAL
IMM GRANULOCYTES # BLD AUTO: 0.2 K/UL (ref 0–0.04)
IMM GRANULOCYTES NFR BLD AUTO: 2.5 % (ref 0–0.5)
LYMPHOCYTES # BLD AUTO: 3.9 K/UL (ref 1–4.8)
LYMPHOCYTES NFR BLD: 48.9 % (ref 18–48)
MCH RBC QN AUTO: 33.3 PG (ref 27–31)
MCHC RBC AUTO-ENTMCNC: 31.2 G/DL (ref 32–36)
MCV RBC AUTO: 107 FL (ref 82–98)
MONOCYTES # BLD AUTO: 0.6 K/UL (ref 0.3–1)
MONOCYTES NFR BLD: 7.5 % (ref 4–15)
NEUTROPHILS # BLD AUTO: 2.8 K/UL (ref 1.8–7.7)
NEUTROPHILS NFR BLD: 35.4 % (ref 38–73)
NRBC BLD-RTO: 22 /100 WBC
PLATELET # BLD AUTO: 37 K/UL (ref 150–450)
PLATELET BLD QL SMEAR: ABNORMAL
PMV BLD AUTO: ABNORMAL FL (ref 9.2–12.9)
POCT GLUCOSE: 179 MG/DL (ref 70–110)
POCT GLUCOSE: 195 MG/DL (ref 70–110)
POCT GLUCOSE: 216 MG/DL (ref 70–110)
POCT GLUCOSE: 217 MG/DL (ref 70–110)
POIKILOCYTOSIS BLD QL SMEAR: SLIGHT
POLYCHROMASIA BLD QL SMEAR: ABNORMAL
RBC # BLD AUTO: 2.19 M/UL (ref 4–5.4)
RETICS/RBC NFR AUTO: 1.9 % (ref 0.5–2.5)
RH BLD: NORMAL
WBC # BLD AUTO: 7.89 K/UL (ref 3.9–12.7)

## 2021-12-19 PROCEDURE — 96372 THER/PROPH/DIAG INJ SC/IM: CPT | Mod: 59

## 2021-12-19 PROCEDURE — 36415 COLL VENOUS BLD VENIPUNCTURE: CPT | Performed by: NURSE PRACTITIONER

## 2021-12-19 PROCEDURE — C9399 UNCLASSIFIED DRUGS OR BIOLOG: HCPCS | Performed by: NURSE PRACTITIONER

## 2021-12-19 PROCEDURE — G0378 HOSPITAL OBSERVATION PER HR: HCPCS

## 2021-12-19 PROCEDURE — 25000003 PHARM REV CODE 250: Performed by: NURSE PRACTITIONER

## 2021-12-19 PROCEDURE — 85025 COMPLETE CBC W/AUTO DIFF WBC: CPT | Performed by: NURSE PRACTITIONER

## 2021-12-19 PROCEDURE — 99214 OFFICE O/P EST MOD 30 MIN: CPT | Mod: ,,, | Performed by: INTERNAL MEDICINE

## 2021-12-19 PROCEDURE — 36415 COLL VENOUS BLD VENIPUNCTURE: CPT | Performed by: STUDENT IN AN ORGANIZED HEALTH CARE EDUCATION/TRAINING PROGRAM

## 2021-12-19 PROCEDURE — 63600175 PHARM REV CODE 636 W HCPCS: Performed by: NURSE PRACTITIONER

## 2021-12-19 PROCEDURE — 85045 AUTOMATED RETICULOCYTE COUNT: CPT | Performed by: STUDENT IN AN ORGANIZED HEALTH CARE EDUCATION/TRAINING PROGRAM

## 2021-12-19 PROCEDURE — 86900 BLOOD TYPING SEROLOGIC ABO: CPT | Performed by: STUDENT IN AN ORGANIZED HEALTH CARE EDUCATION/TRAINING PROGRAM

## 2021-12-19 PROCEDURE — 99214 PR OFFICE/OUTPT VISIT, EST, LEVL IV, 30-39 MIN: ICD-10-PCS | Mod: ,,, | Performed by: INTERNAL MEDICINE

## 2021-12-19 RX ORDER — ACETAMINOPHEN 325 MG/1
650 TABLET ORAL EVERY 4 HOURS PRN
Status: DISCONTINUED | OUTPATIENT
Start: 2021-12-19 | End: 2021-12-20 | Stop reason: HOSPADM

## 2021-12-19 RX ORDER — CALCIUM CARBONATE 200(500)MG
1000 TABLET,CHEWABLE ORAL 3 TIMES DAILY PRN
Status: DISCONTINUED | OUTPATIENT
Start: 2021-12-19 | End: 2021-12-20 | Stop reason: HOSPADM

## 2021-12-19 RX ORDER — GLUCAGON 1 MG
1 KIT INJECTION
Status: DISCONTINUED | OUTPATIENT
Start: 2021-12-19 | End: 2021-12-20 | Stop reason: HOSPADM

## 2021-12-19 RX ORDER — INSULIN ASPART 100 [IU]/ML
0-5 INJECTION, SOLUTION INTRAVENOUS; SUBCUTANEOUS
Status: DISCONTINUED | OUTPATIENT
Start: 2021-12-19 | End: 2021-12-20 | Stop reason: HOSPADM

## 2021-12-19 RX ORDER — NALOXONE HCL 0.4 MG/ML
0.02 VIAL (ML) INJECTION
Status: DISCONTINUED | OUTPATIENT
Start: 2021-12-19 | End: 2021-12-20 | Stop reason: HOSPADM

## 2021-12-19 RX ORDER — IBUPROFEN 200 MG
24 TABLET ORAL
Status: DISCONTINUED | OUTPATIENT
Start: 2021-12-19 | End: 2021-12-20 | Stop reason: HOSPADM

## 2021-12-19 RX ORDER — AMOXICILLIN 250 MG
1 CAPSULE ORAL 2 TIMES DAILY PRN
Status: DISCONTINUED | OUTPATIENT
Start: 2021-12-19 | End: 2021-12-20 | Stop reason: HOSPADM

## 2021-12-19 RX ORDER — IBUPROFEN 200 MG
16 TABLET ORAL
Status: DISCONTINUED | OUTPATIENT
Start: 2021-12-19 | End: 2021-12-20 | Stop reason: HOSPADM

## 2021-12-19 RX ORDER — CYANOCOBALAMIN 1000 UG/ML
1000 INJECTION, SOLUTION INTRAMUSCULAR; SUBCUTANEOUS DAILY
Status: DISCONTINUED | OUTPATIENT
Start: 2021-12-17 | End: 2021-12-20 | Stop reason: HOSPADM

## 2021-12-19 RX ORDER — TALC
6 POWDER (GRAM) TOPICAL NIGHTLY PRN
Status: DISCONTINUED | OUTPATIENT
Start: 2021-12-19 | End: 2021-12-20 | Stop reason: HOSPADM

## 2021-12-19 RX ORDER — SODIUM CHLORIDE 0.9 % (FLUSH) 0.9 %
10 SYRINGE (ML) INJECTION EVERY 12 HOURS PRN
Status: DISCONTINUED | OUTPATIENT
Start: 2021-12-19 | End: 2021-12-20 | Stop reason: HOSPADM

## 2021-12-19 RX ORDER — PRAVASTATIN SODIUM 40 MG/1
40 TABLET ORAL DAILY
Status: DISCONTINUED | OUTPATIENT
Start: 2021-12-19 | End: 2021-12-20 | Stop reason: HOSPADM

## 2021-12-19 RX ORDER — ONDANSETRON 2 MG/ML
4 INJECTION INTRAMUSCULAR; INTRAVENOUS EVERY 8 HOURS PRN
Status: DISCONTINUED | OUTPATIENT
Start: 2021-12-19 | End: 2021-12-20 | Stop reason: HOSPADM

## 2021-12-19 RX ADMIN — INSULIN ASPART 2 UNITS: 100 INJECTION, SOLUTION INTRAVENOUS; SUBCUTANEOUS at 08:12

## 2021-12-19 RX ADMIN — PRAVASTATIN SODIUM 40 MG: 40 TABLET ORAL at 08:12

## 2021-12-19 RX ADMIN — CYANOCOBALAMIN 1000 MCG: 1000 INJECTION, SOLUTION INTRAMUSCULAR at 08:12

## 2021-12-19 RX ADMIN — INSULIN DETEMIR 8 UNITS: 100 INJECTION, SOLUTION SUBCUTANEOUS at 08:12

## 2021-12-20 VITALS
TEMPERATURE: 98 F | WEIGHT: 253.5 LBS | HEIGHT: 64 IN | BODY MASS INDEX: 43.28 KG/M2 | RESPIRATION RATE: 20 BRPM | OXYGEN SATURATION: 100 % | DIASTOLIC BLOOD PRESSURE: 68 MMHG | HEART RATE: 75 BPM | SYSTOLIC BLOOD PRESSURE: 122 MMHG

## 2021-12-20 LAB
ANA SER QL IF: NORMAL
ANA SER QL IF: NORMAL
ANISOCYTOSIS BLD QL SMEAR: ABNORMAL
ANNOTATION COMMENT IMP: NORMAL
BASOPHILS # BLD AUTO: 0.05 K/UL (ref 0–0.2)
BASOPHILS NFR BLD: 0.7 % (ref 0–1.9)
DIFFERENTIAL METHOD: ABNORMAL
EOSINOPHIL # BLD AUTO: 0.4 K/UL (ref 0–0.5)
EOSINOPHIL NFR BLD: 4.9 % (ref 0–8)
ERYTHROCYTE [DISTWIDTH] IN BLOOD BY AUTOMATED COUNT: 24.9 % (ref 11.5–14.5)
HCT VFR BLD AUTO: 27.5 % (ref 37–48.5)
HGB BLD-MCNC: 8.4 G/DL (ref 12–16)
HYPOCHROMIA BLD QL SMEAR: ABNORMAL
IF BLOCK AB SER QL: POSITIVE
IMM GRANULOCYTES # BLD AUTO: 0.24 K/UL (ref 0–0.04)
IMM GRANULOCYTES NFR BLD AUTO: 3.3 % (ref 0–0.5)
LYMPHOCYTES # BLD AUTO: 3.5 K/UL (ref 1–4.8)
LYMPHOCYTES NFR BLD: 47.7 % (ref 18–48)
MCH RBC QN AUTO: 33.7 PG (ref 27–31)
MCHC RBC AUTO-ENTMCNC: 30.5 G/DL (ref 32–36)
MCV RBC AUTO: 110 FL (ref 82–98)
MONOCYTES # BLD AUTO: 0.8 K/UL (ref 0.3–1)
MONOCYTES NFR BLD: 11.1 % (ref 4–15)
NEUTROPHILS # BLD AUTO: 2.4 K/UL (ref 1.8–7.7)
NEUTROPHILS NFR BLD: 32.3 % (ref 38–73)
NRBC BLD-RTO: 42 /100 WBC
PATH REV BLD -IMP: NORMAL
PLATELET # BLD AUTO: 38 K/UL (ref 150–450)
PLATELET BLD QL SMEAR: ABNORMAL
PMV BLD AUTO: ABNORMAL FL (ref 9.2–12.9)
POCT GLUCOSE: 193 MG/DL (ref 70–110)
POIKILOCYTOSIS BLD QL SMEAR: SLIGHT
POLYCHROMASIA BLD QL SMEAR: ABNORMAL
RBC # BLD AUTO: 2.49 M/UL (ref 4–5.4)
WBC # BLD AUTO: 7.3 K/UL (ref 3.9–12.7)

## 2021-12-20 PROCEDURE — 85025 COMPLETE CBC W/AUTO DIFF WBC: CPT | Performed by: STUDENT IN AN ORGANIZED HEALTH CARE EDUCATION/TRAINING PROGRAM

## 2021-12-20 PROCEDURE — 63600175 PHARM REV CODE 636 W HCPCS: Performed by: NURSE PRACTITIONER

## 2021-12-20 PROCEDURE — 96372 THER/PROPH/DIAG INJ SC/IM: CPT | Mod: 59

## 2021-12-20 PROCEDURE — G0378 HOSPITAL OBSERVATION PER HR: HCPCS

## 2021-12-20 PROCEDURE — 36415 COLL VENOUS BLD VENIPUNCTURE: CPT | Performed by: STUDENT IN AN ORGANIZED HEALTH CARE EDUCATION/TRAINING PROGRAM

## 2021-12-20 PROCEDURE — 25000003 PHARM REV CODE 250: Performed by: NURSE PRACTITIONER

## 2021-12-20 PROCEDURE — C9399 UNCLASSIFIED DRUGS OR BIOLOG: HCPCS | Performed by: NURSE PRACTITIONER

## 2021-12-20 RX ORDER — BENZONATATE 100 MG/1
100 CAPSULE ORAL 3 TIMES DAILY PRN
Status: DISCONTINUED | OUTPATIENT
Start: 2021-12-20 | End: 2021-12-20 | Stop reason: HOSPADM

## 2021-12-20 RX ADMIN — BENZONATATE 100 MG: 100 CAPSULE ORAL at 01:12

## 2021-12-20 RX ADMIN — INSULIN DETEMIR 8 UNITS: 100 INJECTION, SOLUTION SUBCUTANEOUS at 08:12

## 2021-12-20 RX ADMIN — PRAVASTATIN SODIUM 40 MG: 40 TABLET ORAL at 08:12

## 2021-12-20 RX ADMIN — CYANOCOBALAMIN 1000 MCG: 1000 INJECTION, SOLUTION INTRAMUSCULAR at 08:12

## 2021-12-21 LAB
METHYLMALONATE SERPL-SCNC: >5 UMOL/L
PCA AB TITR SER IF: NORMAL {TITER}

## 2022-01-06 ENCOUNTER — OFFICE VISIT (OUTPATIENT)
Dept: FAMILY MEDICINE | Facility: CLINIC | Age: 52
End: 2022-01-06
Payer: MEDICARE

## 2022-01-06 VITALS
DIASTOLIC BLOOD PRESSURE: 70 MMHG | BODY MASS INDEX: 45.69 KG/M2 | HEIGHT: 64 IN | OXYGEN SATURATION: 98 % | SYSTOLIC BLOOD PRESSURE: 114 MMHG | TEMPERATURE: 99 F | HEART RATE: 82 BPM | WEIGHT: 267.63 LBS

## 2022-01-06 DIAGNOSIS — Z09 HOSPITAL DISCHARGE FOLLOW-UP: Primary | ICD-10-CM

## 2022-01-06 DIAGNOSIS — D69.6 THROMBOCYTOPENIA, UNSPECIFIED: ICD-10-CM

## 2022-01-06 DIAGNOSIS — E11.69 HYPERLIPIDEMIA ASSOCIATED WITH TYPE 2 DIABETES MELLITUS: ICD-10-CM

## 2022-01-06 DIAGNOSIS — N25.81 SECONDARY HYPERPARATHYROIDISM: ICD-10-CM

## 2022-01-06 DIAGNOSIS — F20.0 SCHIZOPHRENIA, PARANOID: ICD-10-CM

## 2022-01-06 DIAGNOSIS — D64.9 SEVERE ANEMIA: ICD-10-CM

## 2022-01-06 DIAGNOSIS — E78.5 HYPERLIPIDEMIA ASSOCIATED WITH TYPE 2 DIABETES MELLITUS: ICD-10-CM

## 2022-01-06 DIAGNOSIS — E53.8 VITAMIN B12 DEFICIENCY: ICD-10-CM

## 2022-01-06 DIAGNOSIS — D53.9 MACROCYTIC ANEMIA: ICD-10-CM

## 2022-01-06 PROCEDURE — 99999 PR PBB SHADOW E&M-EST. PATIENT-LVL III: CPT | Mod: PBBFAC,,, | Performed by: FAMILY MEDICINE

## 2022-01-06 PROCEDURE — 99214 PR OFFICE/OUTPT VISIT, EST, LEVL IV, 30-39 MIN: ICD-10-PCS | Mod: S$PBB,,, | Performed by: FAMILY MEDICINE

## 2022-01-06 PROCEDURE — 99214 OFFICE O/P EST MOD 30 MIN: CPT | Mod: S$PBB,,, | Performed by: FAMILY MEDICINE

## 2022-01-06 PROCEDURE — 99999 PR PBB SHADOW E&M-EST. PATIENT-LVL III: ICD-10-PCS | Mod: PBBFAC,,, | Performed by: FAMILY MEDICINE

## 2022-01-06 PROCEDURE — 99213 OFFICE O/P EST LOW 20 MIN: CPT | Mod: PBBFAC,PO | Performed by: FAMILY MEDICINE

## 2022-01-06 RX ORDER — CYANOCOBALAMIN 1000 UG/ML
1000 INJECTION, SOLUTION INTRAMUSCULAR; SUBCUTANEOUS DAILY
Qty: 15 ML | Refills: 11 | Status: SHIPPED | OUTPATIENT
Start: 2022-01-06 | End: 2022-01-21

## 2022-01-06 RX ORDER — PALIPERIDONE PALMITATE 156 MG/ML
INJECTION INTRAMUSCULAR
Status: CANCELLED | OUTPATIENT
Start: 2022-01-06

## 2022-01-06 NOTE — PROGRESS NOTES
Subjective:       Patient ID: Ava Driscoll is a 51 y.o. female.    Chief Complaint: Hospital Follow Up    51 YEAR OLD FEMALE IS HERE FOR HOSPITAL FOLLOWUP. She is not checking her blood sugars. She has her diabetes supples. She is drinking cokes. She states she has no symptoms of hypoglycemia.     She admits to swelling of her ankles. She lives with her mom and she has food cooked with salt. She states hre blood pressure is controlled.    She is anemic, but states her energy is much better now. She has been evaluated and started on B1. She has been off the  Medication for azt least 2 weeks. Her labs indicated that she is still anemic, but it has improved. She denies fatigue, shortness of breath.  These symptoms have improved.     Her hospital course is below.       Hospital Course:   51 y.o with anemia (s/p transfusion  RBC x 3 units on 12/17/2021), vitamin B 12 deficiency, schizophrenia, HTN, DM, HLD admitted to observation on 12/18/2021 for severe B12 def anemia, thrombocytopenia, and hematology/oncology evaluation. Patient was called to return to  hospital given abnormal labs.  Patient had no complaints.  Of note, patient was recently admitted for observation on 12/16 to 12/17 for B12 deficiency anemia and thrombocytopenia. At that time, Dr. Slade Rose w/ Hematology/oncology 12/17/21 and felt most likely diagnosis was B12 deficiency and noted that  it is not uncommon to also see a picture of hemolytic anemia in patients with vitamin B12 deficiency.  Overall, uneventful hospitalization course.  Hgb stable. Hematology/oncology was consulted again on this admission and Dr. Jones recommended to continue vitamin B12 IM 1000 mcg daily x7 days, weekly x4 weeks, and then monthly indefinitely. Patient with recent prescription for Vitamin B12 injections, she confirmed she has the supply at home. She has undergone  teaching for self-administration of vitamin B12 injections. Informed the patient that it may take up to 8-12  weeks before seeing complete resolution of cytopenias.     Pt denies any fever, headaches, vision changes, chest pain, shortness of breath, palpitations, abdominal pain, nausea, vomiting, or any new weaknesses.  She denies any vaginal bleeding, bleeding in the urine, or any blood in his stool.  Patient's exam on discharge was as follow: Patient is alert and oriented, appears in no acute distress, heart with regular rate and rhythm, lungs clear to asculation with non-labored breathing, abdomen soft, and no new weaknesses or focal deficits seen.  No evidence of acute bleed at this time.  Bilateral lower extremity without any edema.  No calf tenderness on my exam.  Patient ambulates without difficulty.  Currently on room air and appears in no acute distress.     Patient was counseled regarding any abnormal labs, differential diagnosis, treatment options, risk-benefit, lifestyle changes, prognosis, current condition, and medications. Patient was interactive and attentive.  Patient's questions were answered in a respectful and timely manner. Patient was instructed to follow-up with PCP within 1 week and to continue taking medications as prescribed.  Referral to hematology ordered.  Encourage patient to follow up with Hematology/Oncology and to continue B12 injections as prescribed and discussed.  Patient verbalized understanding and agrees to treatment plan.  Patient is stable for discharge.  Patient has no other questions or concerns at this time.  Extensive ED precautions discussed with the patient.     Vital signs are stable. Ambulating without any difficulty. Tolerating p.o. intake without any nausea or vomiting. Afebrile for over 24 hours. Patient is in stable condition and has no questions or concerns. Patient will be discharge to home.      Hospital Course:   Patient was placed in OBS and had undetectable B12 therefore thought to be primary cause and sent home with B12 injections. However on review of her labs  "that have returned now, her Haptoglobin was <10 and her LDH >3800 with Tbili 2.5, very concerning for Hemolysis. Potentially an autoimmune hemolysis, also with platelets <50.     -Will call patient and ask her to come back to ED     The cause of sheering may in fact be the severe B12 deficiency causing ineffective erythropoiesis secondary to defective DNA and cell maturation, but should be monitored to make sure there is no other reason and does not drop below hb 7.0 again.       Past Medical History:   Diagnosis Date    Behavioral problem     Colon polyps     Diabetes mellitus     History of psychiatric care     History of psychiatric hospitalization     Hx of psychiatric care     Loud snoring     Obese     Psychiatric problem     Retinopathy due to secondary diabetes     Schizophrenia     Severe anemia 2021    Therapy       Past Surgical History:   Procedure Laterality Date     SECTION      COLONOSCOPY N/A 2020    Procedure: COLONOSCOPY;  Surgeon: Edvin Zuniga II, MD;  [facility];  Service: Endoscopy;  Laterality: N/A;    HYSTERECTOMY       Family History   Problem Relation Age of Onset    Arthritis Mother     Diabetes type II Father     Breast cancer Maternal Aunt 80        unilat    Ovarian cancer Neg Hx      Social History     Socioeconomic History    Marital status: Single    Number of children: 2   Tobacco Use    Smoking status: Never Smoker    Smokeless tobacco: Never Used   Substance and Sexual Activity    Alcohol use: No    Drug use: Never       Review of Systems   Respiratory: Negative for chest tightness and shortness of breath.    Cardiovascular: Negative for chest pain.   Neurological: Negative for dizziness, syncope, light-headedness and headaches.         Objective:       Vitals:    22 1038   BP: 114/70   Pulse: 82   Temp: 98.5 °F (36.9 °C)   TempSrc: Oral   SpO2: 98%   Weight: 121.4 kg (267 lb 10.2 oz)   Height: 5' 4" (1.626 m) "       Physical Exam  Constitutional:       General: She is not in acute distress.     Appearance: Normal appearance. She is well-developed and well-nourished. She is obese. She is not ill-appearing, toxic-appearing or diaphoretic.   HENT:      Head: Normocephalic and atraumatic.   Eyes:      Conjunctiva/sclera: Conjunctivae normal.   Neck:      Vascular: No carotid bruit.   Cardiovascular:      Rate and Rhythm: Normal rate and regular rhythm.      Heart sounds: Normal heart sounds. No murmur heard.  No friction rub. No gallop.    Pulmonary:      Effort: Pulmonary effort is normal. No respiratory distress.      Breath sounds: Normal breath sounds. No stridor. No wheezing or rales.   Musculoskeletal:         General: No edema.      Cervical back: Normal range of motion and neck supple.   Neurological:      Mental Status: She is alert and oriented to person, place, and time.         Assessment:       Problem List Items Addressed This Visit     Vitamin B12 deficiency    Relevant Medications    cyanocobalamin 1,000 mcg/mL injection    Secondary hyperparathyroidism    Macrocytic anemia    Relevant Medications    cyanocobalamin 1,000 mcg/mL injection    Hyperlipidemia associated with type 2 diabetes mellitus      Other Visit Diagnoses     Hospital discharge follow-up    -  Primary    Severe anemia        Relevant Medications    cyanocobalamin 1,000 mcg/mL injection    Schizophrenia, paranoid        Body mass index 45.0-49.9, adult        Thrombocytopenia, unspecified              Plan:       Ava was seen today for hospital follow up.    Diagnoses and all orders for this visit:    Hospital discharge follow-up    Severe anemia  -     cyanocobalamin 1,000 mcg/mL injection; INJECT 1 ML (1,000 MCG TOTAL) INTO THE MUSCLE ONCE DAILY FOR 7 DAYS, THEN INJECT 1 ML ONCE PER WEEK FOR 8 WEEKS, THEN TAKE OVER THE COUNTER ORAL B12. FOLLOW UP WITH HEMATOLOGIST  Refilled b12 injections. She will f/u with heme-onc    Vitamin B12  deficiency  -     cyanocobalamin 1,000 mcg/mL injection; INJECT 1 ML (1,000 MCG TOTAL) INTO THE MUSCLE ONCE DAILY FOR 7 DAYS, THEN INJECT 1 ML ONCE PER WEEK FOR 8 WEEKS, THEN TAKE OVER THE COUNTER ORAL B12. FOLLOW UP WITH HEMATOLOGIST    Macrocytic anemia  -     cyanocobalamin 1,000 mcg/mL injection; INJECT 1 ML (1,000 MCG TOTAL) INTO THE MUSCLE ONCE DAILY FOR 7 DAYS, THEN INJECT 1 ML ONCE PER WEEK FOR 8 WEEKS, THEN TAKE OVER THE COUNTER ORAL B12. FOLLOW UP WITH HEMATOLOGIST    Schizophrenia, paranoid  Stable. Per psychiatry. currently on invega      Body mass index 45.0-49.9, adult  Discussed limiting cokes to help with blood sugar and weight control.    Thrombocytopenia, unspecified    Ava was seen today for hospital follow up.    Diagnoses and all orders for this visit:    Hospital discharge follow-up    Severe anemia  -     cyanocobalamin 1,000 mcg/mL injection; INJECT 1 ML (1,000 MCG TOTAL) INTO THE MUSCLE ONCE DAILY FOR 7 DAYS, THEN INJECT 1 ML ONCE PER WEEK FOR 8 WEEKS, THEN TAKE OVER THE COUNTER ORAL B12. FOLLOW UP WITH HEMATOLOGIST    Vitamin B12 deficiency  -     cyanocobalamin 1,000 mcg/mL injection; INJECT 1 ML (1,000 MCG TOTAL) INTO THE MUSCLE ONCE DAILY FOR 7 DAYS, THEN INJECT 1 ML ONCE PER WEEK FOR 8 WEEKS, THEN TAKE OVER THE COUNTER ORAL B12. FOLLOW UP WITH HEMATOLOGIST    Macrocytic anemia  -     cyanocobalamin 1,000 mcg/mL injection; INJECT 1 ML (1,000 MCG TOTAL) INTO THE MUSCLE ONCE DAILY FOR 7 DAYS, THEN INJECT 1 ML ONCE PER WEEK FOR 8 WEEKS, THEN TAKE OVER THE COUNTER ORAL B12. FOLLOW UP WITH HEMATOLOGIST      Hyperlipidemia associated with type 2 diabetes mellitus      Secondary hyperparathyroidism    Other orders  The following orders have not been finalized:  -     Cancel: INVEGA SUSTENNA 156 mg/mL Syrg injection

## 2022-01-07 ENCOUNTER — TELEPHONE (OUTPATIENT)
Dept: ENDOCRINOLOGY | Facility: CLINIC | Age: 52
End: 2022-01-07
Payer: MEDICARE

## 2022-01-07 NOTE — TELEPHONE ENCOUNTER
Attempted to contact pt no answer, unable to lvm. If pt needs to schedule an appt please call [phone].

## 2022-01-07 NOTE — TELEPHONE ENCOUNTER
----- Message from Marleny Lewis sent at 1/6/2022 11:35 AM CST -----  .Type: Patient Call Back    Who called: self     What is the request in detail: patient needs to schedule appt with doctor in february  no appointments are avail on schedule     Can the clinic reply by [facility]?no    Would the patient rather a call back or a response via [facility]? Call     Best call back number: [phone]

## 2022-01-11 ENCOUNTER — TELEPHONE (OUTPATIENT)
Dept: ENDOCRINOLOGY | Facility: CLINIC | Age: 52
End: 2022-01-11
Payer: MEDICARE

## 2022-01-11 NOTE — TELEPHONE ENCOUNTER
----- Message from Marleny Lewis sent at 1/10/2022  2:10 PM CST -----  Regarding: self  .Type:  Patient Returning Call    Who Called: self     Who Left Message for Patient: no she cant recall     Does the patient know what this is regarding?: no     Would the patient rather a call back or a response via [facility]? Call     Best Call Back Number: .[phone]     Additional Information: needs to schedule an appt and is returning call from provider tawana

## 2022-01-20 ENCOUNTER — TELEPHONE (OUTPATIENT)
Dept: HEMATOLOGY/ONCOLOGY | Facility: CLINIC | Age: 52
End: 2022-01-20
Payer: MEDICARE

## 2022-01-20 NOTE — TELEPHONE ENCOUNTER
----- Message from Susan Lemus sent at 1/20/2022  1:15 PM CST -----  Regarding: schedule  Contact: [phone]  Request Appt    Who Called:Ava Antonio Type: F/U  Call Back Number:[phone]  Additional Information:

## 2022-01-27 ENCOUNTER — OFFICE VISIT (OUTPATIENT)
Dept: ENDOCRINOLOGY | Facility: CLINIC | Age: 52
End: 2022-01-27
Payer: MEDICARE

## 2022-01-27 VITALS
DIASTOLIC BLOOD PRESSURE: 83 MMHG | SYSTOLIC BLOOD PRESSURE: 118 MMHG | TEMPERATURE: 99 F | WEIGHT: 259.5 LBS | HEART RATE: 68 BPM | BODY MASS INDEX: 44.54 KG/M2

## 2022-01-27 DIAGNOSIS — D53.9 MACROCYTIC ANEMIA: ICD-10-CM

## 2022-01-27 DIAGNOSIS — E11.65 TYPE 2 DIABETES MELLITUS WITH HYPERGLYCEMIA, WITHOUT LONG-TERM CURRENT USE OF INSULIN: Primary | ICD-10-CM

## 2022-01-27 DIAGNOSIS — N25.81 SECONDARY HYPERPARATHYROIDISM: ICD-10-CM

## 2022-01-27 DIAGNOSIS — I10 ESSENTIAL HYPERTENSION: ICD-10-CM

## 2022-01-27 DIAGNOSIS — F20.0 PARANOID SCHIZOPHRENIA: ICD-10-CM

## 2022-01-27 DIAGNOSIS — E66.01 CLASS 3 SEVERE OBESITY DUE TO EXCESS CALORIES WITH SERIOUS COMORBIDITY AND BODY MASS INDEX (BMI) OF 40.0 TO 44.9 IN ADULT: ICD-10-CM

## 2022-01-27 DIAGNOSIS — E55.9 VITAMIN D DEFICIENCY: ICD-10-CM

## 2022-01-27 PROBLEM — J96.01 ACUTE RESPIRATORY FAILURE WITH HYPOXIA: Status: RESOLVED | Noted: 2020-04-16 | Resolved: 2022-01-27

## 2022-01-27 PROCEDURE — 99214 OFFICE O/P EST MOD 30 MIN: CPT | Mod: PBBFAC | Performed by: HOSPITALIST

## 2022-01-27 PROCEDURE — 99999 PR PBB SHADOW E&M-EST. PATIENT-LVL IV: ICD-10-PCS | Mod: PBBFAC,,, | Performed by: HOSPITALIST

## 2022-01-27 PROCEDURE — 99214 OFFICE O/P EST MOD 30 MIN: CPT | Mod: S$PBB,,, | Performed by: HOSPITALIST

## 2022-01-27 PROCEDURE — 99214 PR OFFICE/OUTPT VISIT, EST, LEVL IV, 30-39 MIN: ICD-10-PCS | Mod: S$PBB,,, | Performed by: HOSPITALIST

## 2022-01-27 PROCEDURE — 99999 PR PBB SHADOW E&M-EST. PATIENT-LVL IV: CPT | Mod: PBBFAC,,, | Performed by: HOSPITALIST

## 2022-01-27 RX ORDER — DULAGLUTIDE 0.75 MG/.5ML
0.75 INJECTION, SOLUTION SUBCUTANEOUS
Qty: 4 PEN | Refills: 11 | Status: SHIPPED | OUTPATIENT
Start: 2022-01-27 | End: 2022-08-29

## 2022-01-27 RX ORDER — METFORMIN HYDROCHLORIDE 1000 MG/1
1000 TABLET ORAL 2 TIMES DAILY WITH MEALS
Qty: 180 TABLET | Refills: 0 | Status: SHIPPED | OUTPATIENT
Start: 2022-01-27 | End: 2022-04-25

## 2022-01-27 RX ORDER — ERGOCALCIFEROL 1.25 MG/1
50000 CAPSULE ORAL
Qty: 12 CAPSULE | Refills: 0 | Status: SHIPPED | OUTPATIENT
Start: 2022-01-27 | End: 2022-04-13

## 2022-01-27 RX ORDER — EMPAGLIFLOZIN 25 MG/1
25 TABLET, FILM COATED ORAL DAILY
Qty: 90 TABLET | Refills: 0 | Status: SHIPPED | OUTPATIENT
Start: 2022-01-27 | End: 2022-02-01 | Stop reason: SDUPTHER

## 2022-01-27 NOTE — PATIENT INSTRUCTIONS
Thank you for completing the visit with me    Following changes were made today:    Metformin 1000mg once a day   Jardiance 25 mg daily    Start Trulicity 0.75mg once a week injection    STOP Glimepiride once starting Truliciut      Please check glucose 1 times a day (before breakfast).   Glucose logs given in clinic, please filled out and bring back to next office visit for me to review   Without this at the next visit, I cannot make any meaningful adjustment to your regimen.   Document any hypoglycemia  episode with date and time for me to review, glucose less than 60    Please make sure to get your dilated eyes examine once a year with you eye doctor.  Monitor your feet for any cuts or skin breakdown regularly    Please CALL MY OFFICE and leave a message if your insurance does not cover the medications I prescribed, so I can prescribed alternative medications.     We will plan an in-clinic visit in 3 months, with labs prior to that appointment.      Yvon Goldstein M.D  [facility]  Endocrinology  [address]  [place] [address]    Office:  [phone]  Fax:  [phone]

## 2022-01-28 PROBLEM — E66.813 CLASS 3 SEVERE OBESITY DUE TO EXCESS CALORIES WITH SERIOUS COMORBIDITY AND BODY MASS INDEX (BMI) OF 40.0 TO 44.9 IN ADULT: Status: ACTIVE | Noted: 2022-01-28

## 2022-01-28 PROBLEM — E66.01 CLASS 3 SEVERE OBESITY DUE TO EXCESS CALORIES WITH SERIOUS COMORBIDITY AND BODY MASS INDEX (BMI) OF 40.0 TO 44.9 IN ADULT: Status: ACTIVE | Noted: 2022-01-28

## 2022-01-28 NOTE — ASSESSMENT & PLAN NOTE
- patient on Invega sustained a which has been shown to cause hyperglycemia  - continue follow-up with psychiatry  - difficult to manage diabetes in setting of paranoid/schizophrenic

## 2022-01-28 NOTE — ASSESSMENT & PLAN NOTE
- no history of hypercalcemia, suspect elevated PTH in setting of hypocalcemia noted in the past  - does not fit criteria for parathyroidectomy  - Vitamin-D deficiency noted  - Repeat level in the future

## 2022-01-28 NOTE — ASSESSMENT & PLAN NOTE
- Body mass index is 44.54 kg/m².  - dietary discussion as above  - continue to monitor weight  - start Trulicity

## 2022-01-28 NOTE — ASSESSMENT & PLAN NOTE
- Diabetes is not  controlled (A1c above 9%), goal A1C for patient is 7%  - Complicated by schizophrenia and the injectable Invega sustanna which has been known to lead to hyperglycemia  - Continue reinforcement of dietary modification, portion size control, decreasing carbohydrates intake  - Due to patient's mood and psychiatric condition, this will make it difficult for diabetes management  - recent blood transfusion will affect A1C reading    Plan  - Continue metformin 1000 mg daily (unable to tolerate higher doses), continue Jardiance 25 mg daily  - start patient on Trulicity 0.75 mg Q weekly injection, demo shown in clinic today, patient was taught how to give injection without any issues today  - hold glimepiride 4 mg once starting GLP1  - advised pt to check glucoses 1-2x a day, glucose logs given in clinic, glucometer and testing strips sent to pharmacy  - Clear written instruction given on AVS and Follow up as schedule  - follow-up with 3 months, check A1c, Fructosamine

## 2022-02-01 DIAGNOSIS — E11.65 TYPE 2 DIABETES MELLITUS WITH HYPERGLYCEMIA, WITHOUT LONG-TERM CURRENT USE OF INSULIN: ICD-10-CM

## 2022-02-01 RX ORDER — EMPAGLIFLOZIN 25 MG/1
25 TABLET, FILM COATED ORAL DAILY
Qty: 90 TABLET | Refills: 1 | Status: SHIPPED | OUTPATIENT
Start: 2022-02-01 | End: 2022-04-29 | Stop reason: SDUPTHER

## 2022-02-01 NOTE — TELEPHONE ENCOUNTER
----- Message from Johnna Rendon sent at 2/1/2022 11:35 AM CST -----  Ava Driscoll calling regarding Refills for empagliflozin (JARDIANCE) 25 mg tablet  call back [phone]      [facility]/pharmacy #[id number] - [place] - [address]  [address] [place] [address]  Phone: [phone] Fax: [phone]

## 2022-02-18 NOTE — ASSESSMENT & PLAN NOTE
Dr Cote at bedside for initial assessment    Likely due to dehydration  S/p fluids  Will repeat CMP

## 2022-02-25 ENCOUNTER — TELEPHONE (OUTPATIENT)
Dept: HEMATOLOGY/ONCOLOGY | Facility: CLINIC | Age: 52
End: 2022-02-25
Payer: MEDICARE

## 2022-02-25 ENCOUNTER — OFFICE VISIT (OUTPATIENT)
Dept: HEMATOLOGY/ONCOLOGY | Facility: CLINIC | Age: 52
End: 2022-02-25
Payer: MEDICARE

## 2022-02-25 ENCOUNTER — LAB VISIT (OUTPATIENT)
Dept: LAB | Facility: HOSPITAL | Age: 52
End: 2022-02-25
Attending: STUDENT IN AN ORGANIZED HEALTH CARE EDUCATION/TRAINING PROGRAM
Payer: MEDICARE

## 2022-02-25 VITALS
SYSTOLIC BLOOD PRESSURE: 128 MMHG | HEIGHT: 62 IN | BODY MASS INDEX: 46.56 KG/M2 | HEART RATE: 82 BPM | OXYGEN SATURATION: 95 % | DIASTOLIC BLOOD PRESSURE: 86 MMHG | WEIGHT: 253 LBS

## 2022-02-25 DIAGNOSIS — D53.9 MACROCYTIC ANEMIA: ICD-10-CM

## 2022-02-25 DIAGNOSIS — D69.6 THROMBOCYTOPENIA: ICD-10-CM

## 2022-02-25 DIAGNOSIS — D51.9 ANEMIA DUE TO VITAMIN B12 DEFICIENCY, UNSPECIFIED B12 DEFICIENCY TYPE: ICD-10-CM

## 2022-02-25 DIAGNOSIS — E53.8 VITAMIN B12 DEFICIENCY: Primary | ICD-10-CM

## 2022-02-25 DIAGNOSIS — E53.8 VITAMIN B12 DEFICIENCY: ICD-10-CM

## 2022-02-25 LAB
ERYTHROCYTE [DISTWIDTH] IN BLOOD BY AUTOMATED COUNT: 15.7 % (ref 11.5–14.5)
HCT VFR BLD AUTO: 45.6 % (ref 37–48.5)
HGB BLD-MCNC: 13.1 G/DL (ref 12–16)
IMM GRANULOCYTES # BLD AUTO: 0.01 K/UL (ref 0–0.04)
MCH RBC QN AUTO: 22.9 PG (ref 27–31)
MCHC RBC AUTO-ENTMCNC: 28.7 G/DL (ref 32–36)
MCV RBC AUTO: 80 FL (ref 82–98)
NEUTROPHILS # BLD AUTO: 3.3 K/UL (ref 1.8–7.7)
PLATELET # BLD AUTO: 343 K/UL (ref 150–450)
PMV BLD AUTO: 9.8 FL (ref 9.2–12.9)
RBC # BLD AUTO: 5.71 M/UL (ref 4–5.4)
WBC # BLD AUTO: 7.72 K/UL (ref 3.9–12.7)

## 2022-02-25 PROCEDURE — 99999 PR PBB SHADOW E&M-EST. PATIENT-LVL III: ICD-10-PCS | Mod: PBBFAC,,, | Performed by: STUDENT IN AN ORGANIZED HEALTH CARE EDUCATION/TRAINING PROGRAM

## 2022-02-25 PROCEDURE — 85027 COMPLETE CBC AUTOMATED: CPT | Performed by: STUDENT IN AN ORGANIZED HEALTH CARE EDUCATION/TRAINING PROGRAM

## 2022-02-25 PROCEDURE — 36415 COLL VENOUS BLD VENIPUNCTURE: CPT | Performed by: STUDENT IN AN ORGANIZED HEALTH CARE EDUCATION/TRAINING PROGRAM

## 2022-02-25 PROCEDURE — 99999 PR PBB SHADOW E&M-EST. PATIENT-LVL III: CPT | Mod: PBBFAC,,, | Performed by: STUDENT IN AN ORGANIZED HEALTH CARE EDUCATION/TRAINING PROGRAM

## 2022-02-25 PROCEDURE — 99213 OFFICE O/P EST LOW 20 MIN: CPT | Mod: PBBFAC | Performed by: STUDENT IN AN ORGANIZED HEALTH CARE EDUCATION/TRAINING PROGRAM

## 2022-02-25 PROCEDURE — 99214 PR OFFICE/OUTPT VISIT, EST, LEVL IV, 30-39 MIN: ICD-10-PCS | Mod: S$PBB,,, | Performed by: STUDENT IN AN ORGANIZED HEALTH CARE EDUCATION/TRAINING PROGRAM

## 2022-02-25 PROCEDURE — 99214 OFFICE O/P EST MOD 30 MIN: CPT | Mod: S$PBB,,, | Performed by: STUDENT IN AN ORGANIZED HEALTH CARE EDUCATION/TRAINING PROGRAM

## 2022-02-25 RX ORDER — CYANOCOBALAMIN 1000 UG/ML
1000 INJECTION, SOLUTION INTRAMUSCULAR; SUBCUTANEOUS
Qty: 6 ML | Refills: 1 | Status: SHIPPED | OUTPATIENT
Start: 2022-02-25 | End: 2023-02-13 | Stop reason: SDUPTHER

## 2022-02-26 NOTE — ASSESSMENT & PLAN NOTE
Cytopenias completed resolved. Intrinsic factor antibody positive.  -Counseled patient that she needs to be on vitamin B12 supplementation forever.  Discussed with her oral vs IM supplementation. She prefers IM.  She can transition to monthly injections.  -labs in 6 months.  -labs and follow up in 1 year.

## 2022-02-26 NOTE — PROGRESS NOTES
PATIENT: Ava Driscoll  MRN: [id number]  DATE: 2022      Diagnosis:   1. Vitamin B12 deficiency    2. Thrombocytopenia    3. Macrocytic anemia    4. Anemia due to vitamin B12 deficiency, unspecified B12 deficiency type        Chief Complaint: Follow-up      Oncologic History:   Oncologic History    Oncologic History      Oncologic Treatment      Pathology          Subjective:    Interval History: Ms. Driscoll returns for follow up.     51 year old woman with schizophrenia, hypertension, diabetes, hyperlipidemia, and obesity is here for follow up for pancytopenia.  I initially saw her 2021 when she was admitted inpatient for severe pancytopenia requiring blood transfusions.  During her hospitalization she was diagnosed with vitamin B12 deficiency.    Since her discharge, she is doing well. She is currently on weekly vitamin b12 injections.  She feels much better compared to her hospital stay.    She is alone at this visit.    Past Medical History:   Past Medical History:   Diagnosis Date    Behavioral problem     Colon polyps     Diabetes mellitus     History of psychiatric care     History of psychiatric hospitalization     Hx of psychiatric care     Loud snoring     Obese     Psychiatric problem     Retinopathy due to secondary diabetes     Schizophrenia     Severe anemia 2021    Therapy        Past Surgical HIstory:   Past Surgical History:   Procedure Laterality Date     SECTION      COLONOSCOPY N/A 2020    Procedure: COLONOSCOPY;  Surgeon: Edvin Zuniga II, MD;  Location: [facility];  Service: Endoscopy;  Laterality: N/A;    HYSTERECTOMY         Family History:   Family History   Problem Relation Age of Onset    Arthritis Mother     Diabetes type II Father     Breast cancer Maternal Aunt 80        unilat    Ovarian cancer Neg Hx        Social History:  reports that she has never smoked. She has never used smokeless tobacco. She reports that she does not drink  alcohol and does not use drugs.    Allergies:  Review of patient's allergies indicates:   Allergen Reactions    Ace inhibitors      Cough       Medications:  Current Outpatient Medications   Medication Sig Dispense Refill    blood sugar diagnostic Strp One test strip use 2 times a day to check blood glucose,  ICD-10: E11.9, compatible with insurance/glucometer 100 each 4    blood-glucose meter kit One glucometer, use to check 2 times a day.   ICD-10: E11.9, Dispense machine covered by insurance 1 each 0    cyanocobalamin 1,000 mcg/mL injection Inject 1 mL (1,000 mcg total) into the muscle every 30 days. 6 mL 1    dulaglutide (TRULICITY) 0.75 mg/0.5 mL pen injector Inject 0.75 mg into the skin every 7 days. 4 pen 11    empagliflozin (JARDIANCE) 25 mg tablet Take 1 tablet (25 mg total) by mouth once daily. 90 tablet 1    ergocalciferol (ERGOCALCIFEROL) 50,000 unit Cap Take 1 capsule (50,000 Units total) by mouth every 7 days. 12 capsule 0    INVEGA SUSTENNA 156 mg/mL Syrg injection       lancets Misc One lancets use 2 times a day to check blood glucose, ICD-10: E11.9 (2)/E10.9 (1) 100 each 4    lancing device Misc One device, used to check blood glucose, ICD-10: E11.9 1 each 0    losartan (COZAAR) 25 MG tablet Take 1 tablet (25 mg total) by mouth once daily. HOLD UNTIL SEEN BY PRIMARY CARE PROVIDER. 90 tablet 1    metFORMIN (GLUCOPHAGE) 1000 MG tablet Take 1 tablet (1,000 mg total) by mouth 2 (two) times daily with meals. 180 tablet 0    pravastatin (PRAVACHOL) 40 MG tablet Take 1 tablet (40 mg total) by mouth once daily. 90 tablet 1     No current facility-administered medications for this visit.       Review of Systems   Constitutional: Negative for activity change, appetite change, chills, diaphoresis, fatigue, fever and unexpected weight change.   HENT: Negative for nosebleeds and trouble swallowing.    Eyes: Negative for visual disturbance.   Respiratory: Negative for cough, chest tightness,  "shortness of breath and wheezing.    Cardiovascular: Negative for chest pain and leg swelling.   Gastrointestinal: Negative for abdominal distention, abdominal pain, blood in stool, constipation, diarrhea, nausea and vomiting.   Endocrine: Negative for cold intolerance and heat intolerance.   Genitourinary: Negative for difficulty urinating and dysuria.   Musculoskeletal: Negative for arthralgias and back pain.   Skin: Negative for color change.   Neurological: Negative for dizziness, weakness, light-headedness, numbness and headaches.   Hematological: Negative for adenopathy. Does not bruise/bleed easily.   Psychiatric/Behavioral: Negative for confusion.       ECOG Performance Status:     ECOG SCORE    0 - Fully active-able to carry on all pre-disease performance without restriction         Objective:      Vitals:   Vitals:    02/25/22 1001   BP: 128/86   BP Location: Left arm   Patient Position: Sitting   BP Method: Medium (Automatic)   Pulse: 82   SpO2: 95%   Weight: 114.8 kg (253 lb)   Height: 5' 2" (1.575 m)     BMI: Body mass index is 46.27 kg/m².    Physical Exam  Constitutional:       General: She is not in acute distress.     Appearance: She is obese. She is not ill-appearing.   HENT:      Head: Normocephalic and atraumatic.      Mouth/Throat:      Pharynx: No oropharyngeal exudate or posterior oropharyngeal erythema.   Eyes:      General: No scleral icterus.     Extraocular Movements: Extraocular movements intact.      Conjunctiva/sclera: Conjunctivae normal.      Pupils: Pupils are equal, round, and reactive to light.   Cardiovascular:      Rate and Rhythm: Normal rate and regular rhythm.      Heart sounds: No murmur heard.    No friction rub. No gallop.   Pulmonary:      Effort: Pulmonary effort is normal. No respiratory distress.      Breath sounds: No stridor. No wheezing, rhonchi or rales.   Abdominal:      General: Bowel sounds are normal. There is no distension.      Palpations: Abdomen is soft. " There is no mass.      Tenderness: There is no abdominal tenderness. There is no guarding or rebound.   Musculoskeletal:         General: Normal range of motion.      Cervical back: Normal range of motion and neck supple.      Right lower leg: No edema.      Left lower leg: No edema.   Skin:     General: Skin is warm and dry.   Neurological:      General: No focal deficit present.      Mental Status: She is alert.         Laboratory Data:   Recent Results (from the past 168 hour(s))   CBC Oncology    Collection Time: 02/25/22 10:26 AM   Result Value Ref Range    WBC 7.72 3.90 - 12.70 K/uL    RBC 5.71 (H) 4.00 - 5.40 M/uL    Hemoglobin 13.1 12.0 - 16.0 g/dL    Hematocrit 45.6 37.0 - 48.5 %    MCV 80 (L) 82 - 98 fL    MCH 22.9 (L) 27.0 - 31.0 pg    MCHC 28.7 (L) 32.0 - 36.0 g/dL    RDW 15.7 (H) 11.5 - 14.5 %    Platelets 343 150 - 450 K/uL    MPV 9.8 9.2 - 12.9 fL    Gran # (ANC) 3.3 1.8 - 7.7 K/uL    Immature Grans (Abs) 0.01 0.00 - 0.04 K/uL         Imaging:       Assessment:       1. Vitamin B12 deficiency    2. Thrombocytopenia    3. Macrocytic anemia    4. Anemia due to vitamin B12 deficiency, unspecified B12 deficiency type           Plan:       Problem List Items Addressed This Visit        Hematology    Thrombocytopenia    Relevant Orders    CBC Oncology       Oncology    Macrocytic anemia    Current Assessment & Plan     resolved           Relevant Orders    CBC Oncology    RESOLVED: B12 deficiency anemia    Current Assessment & Plan     resolved              Endocrine    Vitamin B12 deficiency - Primary    Current Assessment & Plan     Cytopenias completed resolved. Intrinsic factor antibody positive.  -Counseled patient that she needs to be on vitamin B12 supplementation forever.  Discussed with her oral vs IM supplementation. She prefers IM.  She can transition to monthly injections.  -labs in 6 months.  -labs and follow up in 1 year.           Relevant Orders    CBC Oncology          Orders Placed This  Encounter   Procedures    CBC Oncology             Slade Rose MD  Hematology Oncology

## 2022-02-26 NOTE — TELEPHONE ENCOUNTER
Told her lab results look great. Told her she needs to take vitamin B12 for life. Discussed with her injections vs. Oral supplementation.  She prefers to stay with injections.    Told her she can switch to monthly injections and I will send a prescription.  Told her she only needs to see me once a year; she is agreeable with this.    Slade Rose MD  Hematology Oncology

## 2022-04-13 DIAGNOSIS — E11.9 TYPE 2 DIABETES MELLITUS WITHOUT COMPLICATION: ICD-10-CM

## 2022-04-18 ENCOUNTER — TELEPHONE (OUTPATIENT)
Dept: ENDOCRINOLOGY | Facility: CLINIC | Age: 52
End: 2022-04-18
Payer: MEDICARE

## 2022-04-18 NOTE — TELEPHONE ENCOUNTER
----- Message from Ryanne Morin sent at 4/18/2022 12:18 PM CDT -----  Regarding: returning doctor call  Contact: patient  [phone]    please call above patient said she is returning a call to the doctor from today waiting on a call back thanks.

## 2022-04-18 NOTE — TELEPHONE ENCOUNTER
----- Message from Kacy Frias sent at 4/18/2022 10:13 AM CDT -----  Contact: @[phone]  Pt requesting a refill for medication (dulaglutide (TRULICITY) 0.75 mg/0.5 mL pen injector) Please call to discuss further.      Preferred Pharmacy [facility]

## 2022-04-22 ENCOUNTER — LAB VISIT (OUTPATIENT)
Dept: LAB | Facility: HOSPITAL | Age: 52
End: 2022-04-22
Attending: FAMILY MEDICINE
Payer: MEDICARE

## 2022-04-22 DIAGNOSIS — E11.65 TYPE 2 DIABETES MELLITUS WITH HYPERGLYCEMIA, WITHOUT LONG-TERM CURRENT USE OF INSULIN: ICD-10-CM

## 2022-04-22 DIAGNOSIS — N25.81 SECONDARY HYPERPARATHYROIDISM: ICD-10-CM

## 2022-04-22 LAB
ALBUMIN SERPL BCP-MCNC: 3.8 G/DL (ref 3.5–5.2)
ALP SERPL-CCNC: 131 U/L (ref 55–135)
ALT SERPL W/O P-5'-P-CCNC: 13 U/L (ref 10–44)
ANION GAP SERPL CALC-SCNC: 8 MMOL/L (ref 8–16)
AST SERPL-CCNC: 13 U/L (ref 10–40)
BILIRUB SERPL-MCNC: 0.6 MG/DL (ref 0.1–1)
BUN SERPL-MCNC: 15 MG/DL (ref 6–20)
CALCIUM SERPL-MCNC: 9.1 MG/DL (ref 8.7–10.5)
CHLORIDE SERPL-SCNC: 106 MMOL/L (ref 95–110)
CO2 SERPL-SCNC: 26 MMOL/L (ref 23–29)
CREAT SERPL-MCNC: 1.1 MG/DL (ref 0.5–1.4)
EST. GFR  (AFRICAN AMERICAN): >60 ML/MIN/1.73 M^2
EST. GFR  (NON AFRICAN AMERICAN): 58 ML/MIN/1.73 M^2
ESTIMATED AVG GLUCOSE: 148 MG/DL (ref 68–131)
GLUCOSE SERPL-MCNC: 116 MG/DL (ref 70–110)
HBA1C MFR BLD: 6.8 % (ref 4–5.6)
POTASSIUM SERPL-SCNC: 5 MMOL/L (ref 3.5–5.1)
PROT SERPL-MCNC: 8 G/DL (ref 6–8.4)
PTH-INTACT SERPL-MCNC: 117.9 PG/ML (ref 9–77)
SODIUM SERPL-SCNC: 140 MMOL/L (ref 136–145)

## 2022-04-22 PROCEDURE — 80053 COMPREHEN METABOLIC PANEL: CPT | Performed by: HOSPITALIST

## 2022-04-22 PROCEDURE — 82985 ASSAY OF GLYCATED PROTEIN: CPT | Performed by: HOSPITALIST

## 2022-04-22 PROCEDURE — 83036 HEMOGLOBIN GLYCOSYLATED A1C: CPT | Performed by: HOSPITALIST

## 2022-04-22 PROCEDURE — 83970 ASSAY OF PARATHORMONE: CPT | Performed by: HOSPITALIST

## 2022-04-22 PROCEDURE — 36415 COLL VENOUS BLD VENIPUNCTURE: CPT | Mod: PO | Performed by: HOSPITALIST

## 2022-04-27 LAB — FRUCTOSAMINE SERPL-SCNC: 199 UMOL /L (ref 151–300)

## 2022-04-29 ENCOUNTER — OFFICE VISIT (OUTPATIENT)
Dept: ENDOCRINOLOGY | Facility: CLINIC | Age: 52
End: 2022-04-29
Payer: MEDICARE

## 2022-04-29 VITALS
TEMPERATURE: 98 F | DIASTOLIC BLOOD PRESSURE: 81 MMHG | HEART RATE: 61 BPM | WEIGHT: 242.38 LBS | BODY MASS INDEX: 44.34 KG/M2 | SYSTOLIC BLOOD PRESSURE: 119 MMHG

## 2022-04-29 DIAGNOSIS — I10 ESSENTIAL HYPERTENSION: ICD-10-CM

## 2022-04-29 DIAGNOSIS — E11.65 TYPE 2 DIABETES MELLITUS WITH HYPERGLYCEMIA, WITHOUT LONG-TERM CURRENT USE OF INSULIN: ICD-10-CM

## 2022-04-29 DIAGNOSIS — E55.9 VITAMIN D DEFICIENCY: Primary | ICD-10-CM

## 2022-04-29 DIAGNOSIS — E66.01 CLASS 3 SEVERE OBESITY DUE TO EXCESS CALORIES WITH SERIOUS COMORBIDITY AND BODY MASS INDEX (BMI) OF 40.0 TO 44.9 IN ADULT: ICD-10-CM

## 2022-04-29 DIAGNOSIS — F20.0 PARANOID SCHIZOPHRENIA: ICD-10-CM

## 2022-04-29 DIAGNOSIS — E11.9 CONTROLLED TYPE 2 DIABETES MELLITUS WITHOUT COMPLICATION, WITHOUT LONG-TERM CURRENT USE OF INSULIN: ICD-10-CM

## 2022-04-29 PROCEDURE — 99214 PR OFFICE/OUTPT VISIT, EST, LEVL IV, 30-39 MIN: ICD-10-PCS | Mod: S$PBB,,, | Performed by: HOSPITALIST

## 2022-04-29 PROCEDURE — 99213 OFFICE O/P EST LOW 20 MIN: CPT | Mod: PBBFAC | Performed by: HOSPITALIST

## 2022-04-29 PROCEDURE — 99999 PR PBB SHADOW E&M-EST. PATIENT-LVL III: CPT | Mod: PBBFAC,,, | Performed by: HOSPITALIST

## 2022-04-29 PROCEDURE — 99999 PR PBB SHADOW E&M-EST. PATIENT-LVL III: ICD-10-PCS | Mod: PBBFAC,,, | Performed by: HOSPITALIST

## 2022-04-29 PROCEDURE — 99214 OFFICE O/P EST MOD 30 MIN: CPT | Mod: S$PBB,,, | Performed by: HOSPITALIST

## 2022-04-29 RX ORDER — ERGOCALCIFEROL 1.25 MG/1
50000 CAPSULE ORAL
Qty: 12 CAPSULE | Refills: 3 | Status: SHIPPED | OUTPATIENT
Start: 2022-04-29 | End: 2022-08-29 | Stop reason: SDUPTHER

## 2022-04-29 RX ORDER — EMPAGLIFLOZIN 25 MG/1
25 TABLET, FILM COATED ORAL DAILY
Qty: 90 TABLET | Refills: 1 | Status: SHIPPED | OUTPATIENT
Start: 2022-04-29 | End: 2022-08-29 | Stop reason: SDUPTHER

## 2022-04-29 NOTE — PROGRESS NOTES
Subjective:      Patient ID: Ava Driscoll is a 51 y.o. female presented to [facility]  Chief Complaint:  Diabetes    History of Present Illness: Ava Driscoll is a 51 y.o. female here for type 2 diabetes  Other significant past medical history:  Schizophrenia (on Invega sustenna injection), obesity, HLD, recent COVID infection in 2020  Recent admission for:  anemia (s/p transfusion  RBC x 3 units on 12/17/2021), vitamin B 12 deficiency      With regards to Diabetes Mellitus Type 2  Known diabetic complications: none  Diagnosed w/ DM: in 2013      Interval history:  Patient here for follow-up type 2 diabetes doing well.  17 lb weight loss noted.  Tolerating Trulicity injection.  On B12 injection.   On  Invega sustenna injection for over 1 year.  Mood is stable.  Flat affect noted  Patient reports living at home by herself, eat predominant prepackage food.       Glucose log review show stable glucose:  No hypoglycemia  120  127  101  134  112  96    Current meds:               Metformin 1000mg once a day    Trulicity 0.75mg once a week    Jardiance 25 mg daily  Misses medication doses - yes  Injection Technique: Good  Rotation of injection site: Yes   Previous meds:    Glimepiride    Home glucose checks:  Difficult to check glucose, glucose logs available for review today   Hypoglycemia: denies  Diet/Exercise:               Eating 2 meals per day, skip breakfast, eat lunch and dinner, Lean Cuisine  Weight trend: stable  Diabetes Education: No  Diabetes Related Hospitalization:  No  Hx of pancreatitis, hx of thyroid cancer: No  Working: disabled       Eye exam current (within one year): yes  Reports cuts or ulcers on feet:   Denies    Statin: Taking  ACE/ARB: Taking    Diabetes Management Status: Reviewed     A1C Trend  Lab Results   Component Value Date    HGBA1C 6.8 (H) 04/22/2022    HGBA1C 9.4 (H) 11/11/2021    HGBA1C 10.6 (H) 07/01/2021       Lab Results   Component Value Date    MICALBCREAT 16.4  11/11/2021     Lab Results   Component Value Date    ICLJYLZJ69 <148 (L) 12/16/2021     No results found for: TTGIGA    Lab Results   Component Value Date    FRUCTOSAMINE 199 04/22/2022        Screening or Prevention Patient's value Goal Complete/Controlled?   Lipid profile : 12/07/2020 Annually Yes   LDL control Lab Results   Component Value Date    LDLCALC 86.0 12/07/2020    Annually/Less than 100 mg/dl  Yes   Nephropathy screening Lab Results   Component Value Date    LABMICR 32.0 11/11/2021     Lab Results   Component Value Date    PROTEINUA 1+ (A) 07/08/2016    Annually Yes   Blood pressure BP Readings from Last 1 Encounters:   04/29/22 119/81    Less than 140/90 Yes   Dilated retinal exam : 10/18/2021 Annually Yes   Foot exam   : 04/29/2022 Annually Yes     Reviewed past surgical, medical, family, social history and updated as appropriate.    Review of Systems: see HPI above    Objective:   /81 (BP Location: Right arm)   Pulse 61   Temp 98.2 °F (36.8 °C) (Oral)   Wt 110 kg (242 lb 6.4 oz)   BMI 44.34 kg/m²     Body mass index is 44.34 kg/m².  Vital signs reviewed    Physical Exam  Vitals and nursing note reviewed.   Constitutional:       General: She is not in acute distress.     Appearance: Normal appearance. She is well-developed. She is obese.   Neck:      Thyroid: No thyromegaly.   Cardiovascular:      Pulses:           Dorsalis pedis pulses are 1+ on the right side and 1+ on the left side.        Posterior tibial pulses are 1+ on the right side and 1+ on the left side.   Pulmonary:      Effort: Pulmonary effort is normal. No respiratory distress.   Abdominal:      Tenderness: There is no abdominal tenderness.   Musculoskeletal:         General: Normal range of motion.      Cervical back: Neck supple.      Right foot: No deformity or bunion.      Left foot: No deformity or bunion.   Feet:      Right foot:      Protective Sensation: 4 sites tested. 4 sites sensed.      Skin integrity: Skin  integrity normal.      Toenail Condition: Right toenails are long.      Left foot:      Protective Sensation: 4 sites tested. 4 sites sensed.      Skin integrity: Skin integrity normal.      Toenail Condition: Left toenails are long.      Comments: No sign of diabetic neuropathy noted: able to detect vibration sensation on both feet for >8 seconds (with [place] Neuropathy test)  Skin:     General: Skin is warm.      Findings: No erythema.   Neurological:      Mental Status: She is alert and oriented to person, place, and time.   Psychiatric:      Comments: Flat affect overall.         Lab Reviewed:   Lab Results   Component Value Date    HGBA1C 6.8 (H) 04/22/2022       Lab Results   Component Value Date    CHOL 144 12/07/2020    HDL 36 (L) 12/07/2020    LDLCALC 86.0 12/07/2020    TRIG 110 12/07/2020    CHOLHDL 25.0 12/07/2020       Lab Results   Component Value Date     04/22/2022    K 5.0 04/22/2022     04/22/2022    CO2 26 04/22/2022     (H) 04/22/2022    BUN 15 04/22/2022    CREATININE 1.1 04/22/2022    CALCIUM 9.1 04/22/2022    PROT 8.0 04/22/2022    ALBUMIN 3.8 04/22/2022    BILITOT 0.6 04/22/2022    ALKPHOS 131 04/22/2022    AST 13 04/22/2022    ALT 13 04/22/2022    ANIONGAP 8 04/22/2022    ESTGFRAFRICA >60 04/22/2022    EGFRNONAA 58 (A) 04/22/2022    TSH 2.927 12/18/2021        Lab Results   Component Value Date    .9 (H) 04/22/2022    .6 (H) 03/20/2019    MKETBPYT34VL 7 (L) 11/11/2021    LBTGQSEF96TV 10 (L) 03/20/2019    CALCIUM 9.1 04/22/2022    CALCIUM 8.4 (L) 12/18/2021    CALCIUM 8.4 (L) 12/17/2021    PHOS 3.0 11/15/2011    PHOS 3.4 11/14/2011    ALKPHOS 131 04/22/2022    ALKPHOS 53 (L) 12/18/2021    ALKPHOS 57 12/16/2021    TSH 2.927 12/18/2021       Assessment     1. Vitamin D deficiency  ergocalciferol (ERGOCALCIFEROL) 50,000 unit Cap    Vitamin D    Vitamin D    CANCELED: Vitamin D   2. Type 2 diabetes mellitus with hyperglycemia, without long-term current use of  insulin  empagliflozin (JARDIANCE) 25 mg tablet    Hemoglobin A1C    Basic Metabolic Panel    Hemoglobin A1C    Basic Metabolic Panel    CANCELED: Basic Metabolic Panel    CANCELED: Hemoglobin A1C   3. Controlled type 2 diabetes mellitus without complication, without long-term current use of insulin     4. Paranoid schizophrenia     5. Essential hypertension     6. Class 3 severe obesity due to excess calories with serious comorbidity and body mass index (BMI) of 40.0 to 44.9 in adult          Plan     Controlled type 2 diabetes mellitus without complication, without long-term current use of insulin  - Diabetes is controlled given current A1c, goal A1C for patient is <7%  - Complicated by schizophrenia and the injectable Invega sustanna which has been known to lead to hyperglycemia  - has been doing much better job at glycemic control.  Glucose log review show very stable glucose no hypoglycemia  - Continue reinforcement of dietary modification, portion size control, decreasing carbohydrates intake  - weight loss noted  - Fructosamine confirms better glycemic control    Plan  - Continue metformin 1000 mg daily (unable to tolerate higher doses), continue Jardiance 25 mg daily  - we will continue Trulicity 0.75 mg Q weekly injection  - advised pt to check glucoses 1-2x a day, glucose logs given in clinic, glucometer and testing strips sent to pharmacy  - Clear written instruction given on AVS and Follow up as schedule  - follow-up with 4 months, check A1c    Paranoid schizophrenia  - patient on Invega sustained a which has been shown to cause hyperglycemia  - continue follow-up with psychiatry  - difficult to manage diabetes in setting of paranoid/schizophrenic    Essential hypertension  - Chronic, blood pressure reviewed  - Continue management by PCP    Class 3 severe obesity due to excess calories with serious comorbidity and body mass index (BMI) of 40.0 to 44.9 in adult  - Body mass index is 44.34 kg/m².  -  dietary discussion as above  - continue to monitor weight  - 3 loss noted, continue Trulicity at current dose for now, advised patient to contact me for increased dose in the future if needed      RTC in 4 months    Yvon Goldstein M.D  Endocrinology  [facility]  4/29/2022      Disclaimer: This note has been generated using voice-recognition software. There may be typographical errors that have been missed during proof-reading.

## 2022-04-29 NOTE — ASSESSMENT & PLAN NOTE
- Diabetes is controlled given current A1c, goal A1C for patient is <7%  - Complicated by schizophrenia and the injectable Invega sustanna which has been known to lead to hyperglycemia  - has been doing much better job at glycemic control.  Glucose log review show very stable glucose no hypoglycemia  - Continue reinforcement of dietary modification, portion size control, decreasing carbohydrates intake  - weight loss noted  - Fructosamine confirms better glycemic control    Plan  - Continue metformin 1000 mg daily (unable to tolerate higher doses), continue Jardiance 25 mg daily  - we will continue Trulicity 0.75 mg Q weekly injection  - advised pt to check glucoses 1-2x a day, glucose logs given in clinic, glucometer and testing strips sent to pharmacy  - Clear written instruction given on AVS and Follow up as schedule  - follow-up with 4 months, check A1c

## 2022-04-29 NOTE — ASSESSMENT & PLAN NOTE
- Body mass index is 44.34 kg/m².  - dietary discussion as above  - continue to monitor weight  - 3 loss noted, continue Trulicity at current dose for now, advised patient to contact me for increased dose in the future if needed

## 2022-04-29 NOTE — PATIENT INSTRUCTIONS
Thank you for completing the visit with me    Following changes were made today:    Metformin 1000mg once a day   Jardiance 25 mg daily  Trulicity 0.75mg once a week injection    Take Vit D Ergocalciferol once a week       Goal for your blood sugar   In the morning before breakfast:   Before going to bed: 100-140  do not go to bed with glucose less than 100         Please check glucose 1 times a day (before breakfast). 4 times a week  Glucose logs given in clinic, please filled out and bring back to next office visit for me to review  Without this at the next visit, I cannot make any meaningful adjustment to your regimen.  Document any hypoglycemia  episode with date and time for me to review, glucose less than 60    Please make sure to get your dilated eyes examine once a year with you eye doctor.  Monitor your feet for any cuts or skin breakdown regularly    Please CALL MY OFFICE and leave a message if your insurance does not cover the medications I prescribed, so I can prescribed alternative medications.     We will plan an in-clinic visit in 4 months, with labs prior to that appointment.      Yvon Goldstein M.D  [facility]  Endocrinology  [address]  [place] [address]    Office:  [phone]  Fax:  [phone]

## 2022-07-01 ENCOUNTER — TELEPHONE (OUTPATIENT)
Dept: HEMATOLOGY/ONCOLOGY | Facility: CLINIC | Age: 52
End: 2022-07-01
Payer: MEDICARE

## 2022-07-01 NOTE — TELEPHONE ENCOUNTER
Order for labs due in Aug 2022 and Feb 2023 faxed to [facility] per patient request  Copy mailed to patient on this date also

## 2022-08-18 DIAGNOSIS — E78.5 HYPERLIPIDEMIA ASSOCIATED WITH TYPE 2 DIABETES MELLITUS: ICD-10-CM

## 2022-08-18 DIAGNOSIS — E11.65 TYPE 2 DIABETES MELLITUS WITH HYPERGLYCEMIA, WITHOUT LONG-TERM CURRENT USE OF INSULIN: Primary | ICD-10-CM

## 2022-08-18 DIAGNOSIS — E11.69 HYPERLIPIDEMIA ASSOCIATED WITH TYPE 2 DIABETES MELLITUS: ICD-10-CM

## 2022-08-18 RX ORDER — PRAVASTATIN SODIUM 40 MG/1
40 TABLET ORAL DAILY
Qty: 90 TABLET | Refills: 1 | Status: SHIPPED | OUTPATIENT
Start: 2022-08-18 | End: 2023-02-10

## 2022-08-18 NOTE — TELEPHONE ENCOUNTER
----- Message from Marleny Debbie sent at 8/18/2022 11:50 AM CDT -----  .Type: RX Refill Request    Who Called:  self     Have you contacted your pharmacy:    Refill or New Rx: refill     RX Name and Strength: Provastatin       Preferred Pharmacy with phone number: .    [facility]/pharmacy #[id number] - [place] - [address]  [address] [place] [address]  Phone: [phone] Fax: [phone]    Local or Mail Order: local     Ordering Provider Avery     Would the patient rather a call back or a response via [facility]? Call     Best Call Back Number: [phone]

## 2022-08-18 NOTE — TELEPHONE ENCOUNTER
Spoke to patient, lab appointment scheduled 08/29/22. She states she cannot do diabetic eye cam due to transportation issues. She will schedule appointment with Dr Patino for full eye exam

## 2022-08-18 NOTE — TELEPHONE ENCOUNTER
Care Due:                  Date            Visit Type   Department     Provider  --------------------------------------------------------------------------------                                             [facility]     MEDICINE/INTERN  Last Visit: 01-      FOLLOW UP    AL GABRIELLA Adamson  Next Visit: None Scheduled  None         None Found                                                            Last  Test          Frequency    Reason                     Performed    Due Date  --------------------------------------------------------------------------------    Lipid Panel.  12 months..  pravastatin..............  12- 12-    [facility] Embedded Care Gaps. Reference number: [id number]. 8/18/2022   1:17:15 PM CDT

## 2022-08-29 ENCOUNTER — OFFICE VISIT (OUTPATIENT)
Dept: ENDOCRINOLOGY | Facility: CLINIC | Age: 52
End: 2022-08-29
Payer: MEDICARE

## 2022-08-29 VITALS
BODY MASS INDEX: 44.3 KG/M2 | SYSTOLIC BLOOD PRESSURE: 129 MMHG | DIASTOLIC BLOOD PRESSURE: 84 MMHG | WEIGHT: 242.19 LBS | HEART RATE: 45 BPM | TEMPERATURE: 98 F

## 2022-08-29 DIAGNOSIS — E55.9 VITAMIN D DEFICIENCY: ICD-10-CM

## 2022-08-29 DIAGNOSIS — N25.81 SECONDARY HYPERPARATHYROIDISM: ICD-10-CM

## 2022-08-29 DIAGNOSIS — E11.69 HYPERLIPIDEMIA ASSOCIATED WITH TYPE 2 DIABETES MELLITUS: ICD-10-CM

## 2022-08-29 DIAGNOSIS — E11.9 CONTROLLED TYPE 2 DIABETES MELLITUS WITHOUT COMPLICATION, WITHOUT LONG-TERM CURRENT USE OF INSULIN: ICD-10-CM

## 2022-08-29 DIAGNOSIS — E11.65 TYPE 2 DIABETES MELLITUS WITH HYPERGLYCEMIA, WITHOUT LONG-TERM CURRENT USE OF INSULIN: Primary | ICD-10-CM

## 2022-08-29 DIAGNOSIS — E66.01 CLASS 3 SEVERE OBESITY DUE TO EXCESS CALORIES WITH SERIOUS COMORBIDITY AND BODY MASS INDEX (BMI) OF 40.0 TO 44.9 IN ADULT: ICD-10-CM

## 2022-08-29 DIAGNOSIS — E78.5 HYPERLIPIDEMIA ASSOCIATED WITH TYPE 2 DIABETES MELLITUS: ICD-10-CM

## 2022-08-29 DIAGNOSIS — E53.8 VITAMIN B12 DEFICIENCY: ICD-10-CM

## 2022-08-29 DIAGNOSIS — I10 ESSENTIAL HYPERTENSION: ICD-10-CM

## 2022-08-29 DIAGNOSIS — F20.0 PARANOID SCHIZOPHRENIA: ICD-10-CM

## 2022-08-29 PROBLEM — E87.20 LACTIC ACIDOSIS: Status: RESOLVED | Noted: 2020-04-16 | Resolved: 2022-08-29

## 2022-08-29 PROCEDURE — 99214 OFFICE O/P EST MOD 30 MIN: CPT | Mod: S$PBB,,, | Performed by: HOSPITALIST

## 2022-08-29 PROCEDURE — 99214 PR OFFICE/OUTPT VISIT, EST, LEVL IV, 30-39 MIN: ICD-10-PCS | Mod: S$PBB,,, | Performed by: HOSPITALIST

## 2022-08-29 PROCEDURE — 99999 PR PBB SHADOW E&M-EST. PATIENT-LVL III: ICD-10-PCS | Mod: PBBFAC,,, | Performed by: HOSPITALIST

## 2022-08-29 PROCEDURE — 99213 OFFICE O/P EST LOW 20 MIN: CPT | Mod: PBBFAC | Performed by: HOSPITALIST

## 2022-08-29 PROCEDURE — 99999 PR PBB SHADOW E&M-EST. PATIENT-LVL III: CPT | Mod: PBBFAC,,, | Performed by: HOSPITALIST

## 2022-08-29 RX ORDER — EMPAGLIFLOZIN 25 MG/1
25 TABLET, FILM COATED ORAL DAILY
Qty: 90 TABLET | Refills: 1 | Status: SHIPPED | OUTPATIENT
Start: 2022-08-29 | End: 2023-01-30 | Stop reason: SDUPTHER

## 2022-08-29 RX ORDER — ERGOCALCIFEROL 1.25 MG/1
50000 CAPSULE ORAL
Qty: 12 CAPSULE | Refills: 3 | Status: SHIPPED | OUTPATIENT
Start: 2022-08-29 | End: 2023-01-30 | Stop reason: SDUPTHER

## 2022-08-29 RX ORDER — DULAGLUTIDE 1.5 MG/.5ML
1.5 INJECTION, SOLUTION SUBCUTANEOUS WEEKLY
Qty: 4 PEN | Refills: 11 | Status: SHIPPED | OUTPATIENT
Start: 2022-08-29 | End: 2023-10-05 | Stop reason: SDUPTHER

## 2022-08-29 RX ORDER — METFORMIN HYDROCHLORIDE 1000 MG/1
1000 TABLET ORAL
Qty: 90 TABLET | Refills: 3 | Status: SHIPPED | OUTPATIENT
Start: 2022-08-29 | End: 2023-01-30 | Stop reason: SDUPTHER

## 2022-08-29 NOTE — ASSESSMENT & PLAN NOTE
- Body mass index is 44.3 kg/m².  - dietary discussion as above  - continue to monitor weight  - increase Trulicity, encourage continue weight loss

## 2022-08-29 NOTE — ASSESSMENT & PLAN NOTE
- lipid panel review today  - ASCVD Risk below: Statin: Taking  The 10-year ASCVD risk score (Arianna CENTENO, et al., 2019) is: 6.7%    Values used to calculate the score:      Age: 51 years      Sex: Female      Is Non- : Yes      Diabetic: Yes      Tobacco smoker: No      Systolic Blood Pressure: 129 mmHg      Is BP treated: No      HDL Cholesterol: 36 mg/dL      Total Cholesterol: 144 mg/dL

## 2022-08-29 NOTE — PROGRESS NOTES
Subjective:      Patient ID: Ava Driscoll is a 51 y.o. female presented to [facility]  Chief Complaint:  Diabetes    History of Present Illness: Ava Driscoll is a 51 y.o. female here for type 2 diabetes  Other significant past medical history:  Schizophrenia (on Invega sustenna injection), obesity, HLD, recent COVID infection in 2020  Recent admission for:  anemia (s/p transfusion  RBC x 3 units on 12/17/2021), vitamin B 12 deficiency      With regards to Diabetes Mellitus Type 2  Known diabetic complications: none  Diagnosed w/ DM: in 2013      Interval history:  Patient here for follow-up type 2 diabetes doing well.  Continue weight loss noted, over 20 lb since beginning of this year.  Tolerating Trulicity 0.75 mg once a week injection.  No issues with Jardiance, denies UTI  -did lab work at outside hospital, unfortunately I did not receive results   -glucose log review show stable glucose.  No change in Invega  Patient reports living at home by herself, eat predominant prepackage food.     Glucose log review show stable glucose:  No hypoglycemia  121  134  103  113  112  107  100  121  120  115  137  98      Current meds:   Metformin 1000 mg daily (unable to tolerate higher doses)  Jardiance 25 mg daily  Trulicity 0.75 mg Q weekly injection     Misses medication doses - yes  Injection Technique: Good  Rotation of injection site: Yes   Previous meds:    Glimepiride    Home glucose checks:  Difficult to check glucose, glucose logs available for review today   Hypoglycemia: denies  Diet/Exercise:               Eating 2 meals per day, skip breakfast, eat lunch and dinner, Lean Cuisine  Weight trend: stable  Diabetes Education: No  Diabetes Related Hospitalization:  No  Hx of pancreatitis, hx of thyroid cancer: No  Working: disabled       Eye exam current (within one year): yes  Reports cuts or ulcers on feet:   Denies    Statin: Taking  ACE/ARB: Taking    Diabetes Management Status: Reviewed     A1C  Trend  Lab Results   Component Value Date    HGBA1C 6.8 (H) 04/22/2022    HGBA1C 9.4 (H) 11/11/2021    HGBA1C 10.6 (H) 07/01/2021       Lab Results   Component Value Date    MICALBCREAT 16.4 11/11/2021     Lab Results   Component Value Date    GVGGTZTB43 <148 (L) 12/16/2021     No results found for: TTGIGA    Lab Results   Component Value Date    FRUCTOSAMINE 199 04/22/2022        Screening or Prevention Patient's value Goal Complete/Controlled?   Lipid profile : 12/07/2020 Annually Yes   LDL control Lab Results   Component Value Date    LDLCALC 86.0 12/07/2020    Annually/Less than 100 mg/dl  Yes   Nephropathy screening Lab Results   Component Value Date    LABMICR 32.0 11/11/2021     Lab Results   Component Value Date    PROTEINUA 1+ (A) 07/08/2016    Annually Yes   Blood pressure BP Readings from Last 1 Encounters:   08/29/22 129/84    Less than 140/90 Yes   Dilated retinal exam : 10/18/2021 Annually Yes   Foot exam   : 04/29/2022 Annually Yes       Hyperparathyroidism  - no hypercalcemia  - history of hypocalcemia  - not on hydrochlorothiazide   - vitamin-D deficiency noted   - repeat level     Lab Results   Component Value Date    .9 (H) 04/22/2022    .6 (H) 03/20/2019    IXRRWTJO18KN 7 (L) 11/11/2021    OUXILDGH08IP 10 (L) 03/20/2019    CALCIUM 9.1 04/22/2022    CALCIUM 8.4 (L) 12/18/2021    CALCIUM 8.4 (L) 12/17/2021    PHOS 3.0 11/15/2011    PHOS 3.4 11/14/2011    ALKPHOS 131 04/22/2022    ALKPHOS 53 (L) 12/18/2021    ALKPHOS 57 12/16/2021        Reviewed past surgical, medical, family, social history and updated as appropriate.    Review of Systems: see HPI above    Objective:   /84 (BP Location: Right arm)   Pulse (!) 45   Temp 97.9 °F (36.6 °C) (Oral)   Wt 109.9 kg (242 lb 3.2 oz)   BMI 44.30 kg/m²     Body mass index is 44.3 kg/m².  Vital signs reviewed    Physical Exam  Vitals and nursing note reviewed.   Constitutional:       Appearance: Normal appearance. She is well-developed.  She is obese. She is not ill-appearing.   Neck:      Thyroid: No thyromegaly.   Pulmonary:      Effort: Pulmonary effort is normal. No respiratory distress.   Musculoskeletal:         General: Normal range of motion.      Cervical back: Normal range of motion.   Neurological:      General: No focal deficit present.      Mental Status: She is alert. Mental status is at baseline.   Psychiatric:         Mood and Affect: Mood normal.         Behavior: Behavior normal.       Lab Reviewed:   Lab Results   Component Value Date    HGBA1C 6.8 (H) 04/22/2022       Lab Results   Component Value Date    CHOL 144 12/07/2020    HDL 36 (L) 12/07/2020    LDLCALC 86.0 12/07/2020    TRIG 110 12/07/2020    CHOLHDL 25.0 12/07/2020       Lab Results   Component Value Date     04/22/2022    K 5.0 04/22/2022     04/22/2022    CO2 26 04/22/2022     (H) 04/22/2022    BUN 15 04/22/2022    CREATININE 1.1 04/22/2022    CALCIUM 9.1 04/22/2022    PROT 8.0 04/22/2022    ALBUMIN 3.8 04/22/2022    BILITOT 0.6 04/22/2022    ALKPHOS 131 04/22/2022    AST 13 04/22/2022    ALT 13 04/22/2022    ANIONGAP 8 04/22/2022    ESTGFRAFRICA >60 04/22/2022    EGFRNONAA 58 (A) 04/22/2022    TSH 2.927 12/18/2021       Lab Results   Component Value Date    .9 (H) 04/22/2022    .6 (H) 03/20/2019    DWTWKGLL33UE 7 (L) 11/11/2021    JUYFRKCH27GN 10 (L) 03/20/2019    CALCIUM 9.1 04/22/2022    CALCIUM 8.4 (L) 12/18/2021    CALCIUM 8.4 (L) 12/17/2021    PHOS 3.0 11/15/2011    PHOS 3.4 11/14/2011    ALKPHOS 131 04/22/2022    ALKPHOS 53 (L) 12/18/2021    ALKPHOS 57 12/16/2021    TSH 2.927 12/18/2021       Assessment     1. Type 2 diabetes mellitus with hyperglycemia, without long-term current use of insulin  Basic Metabolic Panel    Hemoglobin A1C    dulaglutide (TRULICITY) 1.5 mg/0.5 mL pen injector    empagliflozin (JARDIANCE) 25 mg tablet    metFORMIN (GLUCOPHAGE) 1000 MG tablet    Hemoglobin A1C    DISCONTINUED: dulaglutide (TRULICITY)  1.5 mg/0.5 mL pen injector      2. Vitamin D deficiency  Vitamin D    ergocalciferol (ERGOCALCIFEROL) 50,000 unit Cap      3. Class 3 severe obesity due to excess calories with serious comorbidity and body mass index (BMI) of 40.0 to 44.9 in adult        4. Controlled type 2 diabetes mellitus without complication, without long-term current use of insulin        5. Essential hypertension        6. Paranoid schizophrenia        7. Secondary hyperparathyroidism        8. Hyperlipidemia associated with type 2 diabetes mellitus        9. Vitamin B12 deficiency               Plan     Type 2 diabetes mellitus with hyperglycemia, without long-term current use of insulin  - Diabetes is controlled given glucose log, suspect A1c will be at goal A1C  - A1c goal for patient is <7%  - Complicated by schizophrenia and the injectable Invega sustanna which has been known to lead to hyperglycemia  - has been doing much better job at glycemic control.  Glucose log review show very stable glucose no hypoglycemia  - Continue reinforcement of dietary modification, portion size control, decreasing carbohydrates intake  - encourage patient for continue weight loss    Plan  - Continue metformin 1000 mg daily (unable to tolerate higher doses), continue Jardiance 25 mg daily  - increase Trulicity to 1.5 mg once a week injection, to help with additional weight loss and glucose control  - advised pt to check glucoses 1-2x a day, glucose logs given in clinic, glucometer and testing strips sent to pharmacy  - Clear written instruction given on AVS and Follow up as schedule  - follow-up with 4-5 months, check A1c    Class 3 severe obesity due to excess calories with serious comorbidity and body mass index (BMI) of 40.0 to 44.9 in adult  - Body mass index is 44.3 kg/m².  - dietary discussion as above  - continue to monitor weight  - increase Trulicity, encourage continue weight loss    Essential hypertension  - Chronic, blood pressure reviewed  -  Continue management by PCP    Paranoid schizophrenia  - patient on Invega sustained a which has been shown to cause hyperglycemia  - continue follow-up with psychiatry  - difficult to manage diabetes in setting of paranoid/schizophrenic    Secondary hyperparathyroidism  - no history of hypercalcemia, suspect elevated PTH in setting of hypocalcemia noted in the past  - does not fit criteria for parathyroidectomy  - Vitamin-D deficiency noted  - Repeat level in the future    Vitamin D deficiency  - Lab work reviewed, and discussed with patient in clinic  - Currently not on  Vit D2 [id number] qweekly  - lab work every 6 mo or yearly      Hyperlipidemia associated with type 2 diabetes mellitus  - lipid panel review today  - ASCVD Risk below: Statin: Taking  The 10-year ASCVD risk score (Arianna DK, et al., 2019) is: 6.7%    Values used to calculate the score:      Age: 51 years      Sex: Female      Is Non- : Yes      Diabetic: Yes      Tobacco smoker: No      Systolic Blood Pressure: 129 mmHg      Is BP treated: No      HDL Cholesterol: 36 mg/dL      Total Cholesterol: 144 mg/dL    Vitamin B12 deficiency  - on injection per primary      RTC in 4-5 months    Yvon Goldstein M.D  Endocrinology  [facility]  8/29/2022      Disclaimer: This note has been generated using voice-recognition software. There may be typographical errors that have been missed during proof-reading.

## 2022-08-29 NOTE — PATIENT INSTRUCTIONS
Thank you for completing the visit with me    Following changes were made today:    Metformin 1000mg once a day   Jardiance 25 mg daily  Trulicity 1.5mg once a week injection    Take Vit D Ergocalciferol once a week       Goal for your blood sugar   In the morning before breakfast:   Before going to bed: 100-140  do not go to bed with glucose less than 100         Please check glucose 1 times a day (before breakfast). 4 times a week  Glucose logs given in clinic, please filled out and bring back to next office visit for me to review  Without this at the next visit, I cannot make any meaningful adjustment to your regimen.  Document any hypoglycemia  episode with date and time for me to review, glucose less than 60    Please make sure to get your dilated eyes examine once a year with you eye doctor.  Monitor your feet for any cuts or skin breakdown regularly    Please CALL MY OFFICE and leave a message if your insurance does not cover the medications I prescribed, so I can prescribed alternative medications.     We will plan an in-clinic visit in 4 months, with labs prior to that appointment.      Yvon Goldstein M.D  [facility]  Endocrinology  [address]  [place] [address]    Office:  [phone]  Fax:  [phone]

## 2022-08-29 NOTE — ASSESSMENT & PLAN NOTE
- Diabetes is controlled given glucose log, suspect A1c will be at goal A1C  - A1c goal for patient is <7%  - Complicated by schizophrenia and the injectable Invega sustanna which has been known to lead to hyperglycemia  - has been doing much better job at glycemic control.  Glucose log review show very stable glucose no hypoglycemia  - Continue reinforcement of dietary modification, portion size control, decreasing carbohydrates intake  - encourage patient for continue weight loss    Plan  - Continue metformin 1000 mg daily (unable to tolerate higher doses), continue Jardiance 25 mg daily  - increase Trulicity to 1.5 mg once a week injection, to help with additional weight loss and glucose control  - advised pt to check glucoses 1-2x a day, glucose logs given in clinic, glucometer and testing strips sent to pharmacy  - Clear written instruction given on AVS and Follow up as schedule  - follow-up with 4-5 months, check A1c

## 2022-08-29 NOTE — ASSESSMENT & PLAN NOTE
- Lab work reviewed, and discussed with patient in clinic  - Currently not on  Vit D2 [id number] qweekly  - lab work every 6 mo or yearly

## 2022-08-30 ENCOUNTER — LAB VISIT (OUTPATIENT)
Dept: LAB | Facility: HOSPITAL | Age: 52
End: 2022-08-30
Attending: FAMILY MEDICINE
Payer: MEDICARE

## 2022-08-30 DIAGNOSIS — E11.65 TYPE 2 DIABETES MELLITUS WITH HYPERGLYCEMIA, WITHOUT LONG-TERM CURRENT USE OF INSULIN: ICD-10-CM

## 2022-08-30 DIAGNOSIS — E53.8 VITAMIN B12 DEFICIENCY: ICD-10-CM

## 2022-08-30 DIAGNOSIS — E55.9 VITAMIN D DEFICIENCY: ICD-10-CM

## 2022-08-30 DIAGNOSIS — E11.9 TYPE 2 DIABETES MELLITUS WITHOUT COMPLICATION: ICD-10-CM

## 2022-08-30 DIAGNOSIS — D53.9 MACROCYTIC ANEMIA: ICD-10-CM

## 2022-08-30 DIAGNOSIS — D69.6 THROMBOCYTOPENIA: ICD-10-CM

## 2022-08-30 LAB
25(OH)D3+25(OH)D2 SERPL-MCNC: 28 NG/ML (ref 30–96)
ANION GAP SERPL CALC-SCNC: 7 MMOL/L (ref 8–16)
BUN SERPL-MCNC: 18 MG/DL (ref 6–20)
CALCIUM SERPL-MCNC: 9.3 MG/DL (ref 8.7–10.5)
CHLORIDE SERPL-SCNC: 106 MMOL/L (ref 95–110)
CHOLEST SERPL-MCNC: 144 MG/DL (ref 120–199)
CHOLEST/HDLC SERPL: 3.7 {RATIO} (ref 2–5)
CO2 SERPL-SCNC: 28 MMOL/L (ref 23–29)
CREAT SERPL-MCNC: 1.1 MG/DL (ref 0.5–1.4)
ERYTHROCYTE [DISTWIDTH] IN BLOOD BY AUTOMATED COUNT: 14.6 % (ref 11.5–14.5)
EST. GFR  (NO RACE VARIABLE): >60 ML/MIN/1.73 M^2
ESTIMATED AVG GLUCOSE: 128 MG/DL (ref 68–131)
GLUCOSE SERPL-MCNC: 94 MG/DL (ref 70–110)
HBA1C MFR BLD: 6.1 % (ref 4–5.6)
HCT VFR BLD AUTO: 46 % (ref 37–48.5)
HDLC SERPL-MCNC: 39 MG/DL (ref 40–75)
HDLC SERPL: 27.1 % (ref 20–50)
HGB BLD-MCNC: 14.2 G/DL (ref 12–16)
IMM GRANULOCYTES # BLD AUTO: 0.02 K/UL (ref 0–0.04)
LDLC SERPL CALC-MCNC: 87.8 MG/DL (ref 63–159)
MCH RBC QN AUTO: 24.9 PG (ref 27–31)
MCHC RBC AUTO-ENTMCNC: 30.9 G/DL (ref 32–36)
MCV RBC AUTO: 81 FL (ref 82–98)
NEUTROPHILS # BLD AUTO: 2.9 K/UL (ref 1.8–7.7)
NONHDLC SERPL-MCNC: 105 MG/DL
PLATELET # BLD AUTO: 297 K/UL (ref 150–450)
PMV BLD AUTO: 9.2 FL (ref 9.2–12.9)
POTASSIUM SERPL-SCNC: 5.4 MMOL/L (ref 3.5–5.1)
RBC # BLD AUTO: 5.7 M/UL (ref 4–5.4)
SODIUM SERPL-SCNC: 141 MMOL/L (ref 136–145)
TRIGL SERPL-MCNC: 86 MG/DL (ref 30–150)
WBC # BLD AUTO: 7.7 K/UL (ref 3.9–12.7)

## 2022-08-30 PROCEDURE — 82306 VITAMIN D 25 HYDROXY: CPT | Performed by: HOSPITALIST

## 2022-08-30 PROCEDURE — 36415 COLL VENOUS BLD VENIPUNCTURE: CPT | Performed by: HOSPITALIST

## 2022-08-30 PROCEDURE — 83036 HEMOGLOBIN GLYCOSYLATED A1C: CPT | Performed by: HOSPITALIST

## 2022-08-30 PROCEDURE — 80061 LIPID PANEL: CPT | Performed by: FAMILY MEDICINE

## 2022-08-30 PROCEDURE — 80048 BASIC METABOLIC PNL TOTAL CA: CPT | Performed by: HOSPITALIST

## 2022-08-30 PROCEDURE — 85027 COMPLETE CBC AUTOMATED: CPT | Performed by: STUDENT IN AN ORGANIZED HEALTH CARE EDUCATION/TRAINING PROGRAM

## 2022-11-08 ENCOUNTER — TELEPHONE (OUTPATIENT)
Dept: FAMILY MEDICINE | Facility: CLINIC | Age: 52
End: 2022-11-08
Payer: MEDICARE

## 2022-11-08 DIAGNOSIS — Z12.31 ENCOUNTER FOR SCREENING MAMMOGRAM FOR BREAST CANCER: Primary | ICD-10-CM

## 2022-11-08 NOTE — TELEPHONE ENCOUNTER
----- Message from Travis Manjarrez sent at 11/8/2022  1:12 PM CST -----  Name of Who is Calling: NICK TRINIDAD [[id number]]           What is the request in detail: Patient is requesting an order for a mammogram and to be contacted for scheduling.  Please assist.           Can the clinic reply by [facility]: No           What Number to Call Back if not in [facility]: [phone]

## 2022-11-22 ENCOUNTER — HOSPITAL ENCOUNTER (OUTPATIENT)
Dept: RADIOLOGY | Facility: HOSPITAL | Age: 52
Discharge: HOME | End: 2022-11-22
Attending: FAMILY MEDICINE
Payer: MEDICARE

## 2022-11-22 DIAGNOSIS — Z12.31 ENCOUNTER FOR SCREENING MAMMOGRAM FOR BREAST CANCER: ICD-10-CM

## 2022-11-22 PROCEDURE — 77067 SCR MAMMO BI INCL CAD: CPT | Mod: TC

## 2022-11-22 PROCEDURE — 77063 MAMMO DIGITAL SCREENING BILAT WITH TOMO: ICD-10-PCS | Mod: 26,,, | Performed by: RADIOLOGY

## 2022-11-22 PROCEDURE — 77063 BREAST TOMOSYNTHESIS BI: CPT | Mod: 26,,, | Performed by: RADIOLOGY

## 2022-11-22 PROCEDURE — 77067 MAMMO DIGITAL SCREENING BILAT WITH TOMO: ICD-10-PCS | Mod: 26,,, | Performed by: RADIOLOGY

## 2022-11-22 PROCEDURE — 77067 SCR MAMMO BI INCL CAD: CPT | Mod: 26,,, | Performed by: RADIOLOGY

## 2022-11-22 PROCEDURE — 77063 BREAST TOMOSYNTHESIS BI: CPT | Mod: TC

## 2022-12-06 ENCOUNTER — TELEPHONE (OUTPATIENT)
Dept: FAMILY MEDICINE | Facility: CLINIC | Age: 52
End: 2022-12-06
Payer: MEDICARE

## 2022-12-06 NOTE — TELEPHONE ENCOUNTER
----- Message from Priscilla Hardin sent at 12/5/2022 10:53 AM CST -----  Regarding: Request for Sooner Apt  Type:  Sooner Appointment Request    Patient is requesting a sooner appointment.  Patient declined first available appointment listed as well as another facility and provider .  Patient will not accept being placed on the waitlist and is requesting a message be sent to doctor.    Name of Caller: Self     When is the first available appointment? 12/9/22- wants     Symptoms: Runny nose, eyes , and a lot of sneezing    Would the patient rather a call back or a response via [facility]? Call     Best Call Back Number: [phone]

## 2022-12-08 ENCOUNTER — OFFICE VISIT (OUTPATIENT)
Dept: FAMILY MEDICINE | Facility: CLINIC | Age: 52
End: 2022-12-08
Payer: MEDICARE

## 2022-12-08 VITALS
SYSTOLIC BLOOD PRESSURE: 110 MMHG | HEIGHT: 64 IN | OXYGEN SATURATION: 98 % | RESPIRATION RATE: 16 BRPM | HEART RATE: 68 BPM | BODY MASS INDEX: 41.59 KG/M2 | WEIGHT: 243.63 LBS | TEMPERATURE: 98 F | DIASTOLIC BLOOD PRESSURE: 80 MMHG

## 2022-12-08 DIAGNOSIS — J30.89 NON-SEASONAL ALLERGIC RHINITIS, UNSPECIFIED TRIGGER: Primary | ICD-10-CM

## 2022-12-08 PROBLEM — Z87.01 HISTORY OF PNEUMONIA: Status: ACTIVE | Noted: 2020-04-28

## 2022-12-08 PROBLEM — Z75.8 DISCHARGE PLANNING ISSUES: Status: RESOLVED | Noted: 2020-04-20 | Resolved: 2022-12-08

## 2022-12-08 PROBLEM — E11.65 TYPE 2 DIABETES MELLITUS WITH HYPERGLYCEMIA, WITHOUT LONG-TERM CURRENT USE OF INSULIN: Status: RESOLVED | Noted: 2019-03-20 | Resolved: 2022-12-08

## 2022-12-08 PROBLEM — Z86.16 HISTORY OF COVID-19: Status: ACTIVE | Noted: 2020-04-16

## 2022-12-08 PROBLEM — Z12.11 COLON CANCER SCREENING: Status: RESOLVED | Noted: 2020-02-18 | Resolved: 2022-12-08

## 2022-12-08 PROBLEM — U07.1 COVID-19 VIRUS INFECTION: Status: RESOLVED | Noted: 2020-04-16 | Resolved: 2022-12-08

## 2022-12-08 PROCEDURE — 99214 OFFICE O/P EST MOD 30 MIN: CPT | Mod: PBBFAC,PO | Performed by: NURSE PRACTITIONER

## 2022-12-08 PROCEDURE — 99999 PR PBB SHADOW E&M-EST. PATIENT-LVL IV: CPT | Mod: PBBFAC,,, | Performed by: NURSE PRACTITIONER

## 2022-12-08 PROCEDURE — 99214 OFFICE O/P EST MOD 30 MIN: CPT | Mod: S$PBB,,, | Performed by: NURSE PRACTITIONER

## 2022-12-08 PROCEDURE — 99999 PR PBB SHADOW E&M-EST. PATIENT-LVL IV: ICD-10-PCS | Mod: PBBFAC,,, | Performed by: NURSE PRACTITIONER

## 2022-12-08 PROCEDURE — 99214 PR OFFICE/OUTPT VISIT, EST, LEVL IV, 30-39 MIN: ICD-10-PCS | Mod: S$PBB,,, | Performed by: NURSE PRACTITIONER

## 2022-12-08 RX ORDER — LEVOCETIRIZINE DIHYDROCHLORIDE 5 MG/1
5 TABLET, FILM COATED ORAL NIGHTLY
Qty: 90 TABLET | Refills: 3 | Status: SHIPPED | OUTPATIENT
Start: 2022-12-08 | End: 2023-12-08

## 2022-12-08 RX ORDER — FLUTICASONE PROPIONATE 50 MCG
2 SPRAY, SUSPENSION (ML) NASAL 2 TIMES DAILY
Qty: 16 G | Refills: 2 | Status: SHIPPED | OUTPATIENT
Start: 2022-12-08

## 2022-12-08 NOTE — PROGRESS NOTES
Subjective:      Patient ID: Ava Driscoll is a 52 y.o. female.  New to me but seen previously in clinic by a fellow provider. Pt presents to clinic with acute worsening of chronic sinus allergies.     Sinusitis  Chronicity: acute on chronic. The current episode started in the past 7 days. The problem has been gradually worsening since onset. There has been no fever. Her pain is at a severity of 0/10. She is experiencing no pain. Associated symptoms include congestion, coughing, sinus pressure and sneezing. Pertinent negatives include no chills, diaphoresis, ear pain, headaches, hoarse voice, neck pain, shortness of breath, sore throat or swollen glands. Treatments tried: oral antihistamine, OTC cough/cold. The treatment provided no relief.   Review of Systems   Constitutional:  Negative for activity change, appetite change, chills, diaphoresis, fatigue, fever and unexpected weight change.   HENT:  Positive for nasal congestion, postnasal drip, rhinorrhea, sinus pressure/congestion and sneezing. Negative for ear discharge, ear pain, hoarse voice, sore throat, trouble swallowing and voice change.    Eyes:  Positive for itching. Negative for photophobia, pain, discharge, redness, visual disturbance and eye dryness.   Respiratory:  Positive for cough. Negative for chest tightness, shortness of breath and wheezing.    Cardiovascular:  Negative for chest pain and palpitations.   Gastrointestinal:  Negative for abdominal pain, change in bowel habit, constipation, diarrhea, nausea, vomiting and change in bowel habit.   Genitourinary:  Negative for difficulty urinating, dysuria and menstrual problem.   Musculoskeletal:  Negative for arthralgias, back pain, gait problem, myalgias and neck pain.   Integumentary:  Negative for rash.   Allergic/Immunologic: Positive for environmental allergies.   Neurological:  Negative for headaches.   Psychiatric/Behavioral: Negative.     All other systems reviewed and are negative.     "  Objective:     Vitals:    12/08/22 0833   BP: 110/80   Pulse: 68   Resp: 16   Temp: 98.1 °F (36.7 °C)   TempSrc: Oral   SpO2: 98%   Weight: 110.5 kg (243 lb 9.7 oz)   Height: 5' 4" (1.626 m)     Physical Exam  Vitals and nursing note reviewed.   Constitutional:       General: She is not in acute distress.     Appearance: Normal appearance. She is well-developed and well-groomed. She is obese. She is not ill-appearing.   HENT:      Head: Normocephalic and atraumatic.      Right Ear: Tympanic membrane, ear canal and external ear normal.      Left Ear: Tympanic membrane, ear canal and external ear normal.      Nose: Mucosal edema and rhinorrhea present. No congestion. Rhinorrhea is clear.      Right Sinus: Maxillary sinus tenderness present.      Left Sinus: Maxillary sinus tenderness present.      Mouth/Throat:      Lips: Pink.      Mouth: Mucous membranes are moist.      Pharynx: Oropharynx is clear. Uvula midline. Posterior oropharyngeal erythema (injected with cobblestoning) present. No pharyngeal swelling, oropharyngeal exudate or uvula swelling.   Eyes:      General: Lids are normal. Vision grossly intact. Gaze aligned appropriately. Allergic shiner present.      Conjunctiva/sclera: Conjunctivae normal.      Pupils: Pupils are equal, round, and reactive to light.   Neck:      Trachea: Phonation normal.   Cardiovascular:      Rate and Rhythm: Normal rate and regular rhythm.      Heart sounds: Normal heart sounds.   Pulmonary:      Effort: Pulmonary effort is normal. No accessory muscle usage or respiratory distress.      Breath sounds: Normal breath sounds and air entry.   Abdominal:      General: Abdomen is flat. Bowel sounds are normal. There is no distension.      Palpations: Abdomen is soft.      Tenderness: There is no abdominal tenderness.   Musculoskeletal:      Cervical back: Neck supple.      Right lower leg: No edema.      Left lower leg: No edema.   Lymphadenopathy:      Cervical: No cervical " adenopathy.   Skin:     General: Skin is warm and dry.      Findings: No rash.   Neurological:      General: No focal deficit present.      Mental Status: She is alert and oriented to person, place, and time. Mental status is at baseline.      Sensory: Sensation is intact.      Motor: Motor function is intact.      Coordination: Coordination is intact.      Gait: Gait is intact.   Psychiatric:         Attention and Perception: Attention and perception normal.         Mood and Affect: Mood and affect normal.         Speech: Speech normal.         Behavior: Behavior normal. Behavior is cooperative.         Thought Content: Thought content normal.         Cognition and Memory: Cognition and memory normal.         Judgment: Judgment normal.     Assessment and Plan:     1. Non-seasonal allergic rhinitis, unspecified trigger  Symptomatic therapy suggested: rest, increase fluids, gargle prn for sore throat, apply heat to sinuses prn, use mist of vaporizer prn, OTC acetaminophen, ibuprofen, cough suppressant of choice, and call prn if symptoms persist or worsen. Call or return to clinic prn if these symptoms worsen or fail to improve as anticipated.  - fluticasone propionate (FLONASE) 50 mcg/actuation nasal spray; 2 sprays (100 mcg total) by Each Nostril route 2 (two) times a day.  Dispense: 16 g; Refill: 2  - levocetirizine (XYZAL) 5 MG tablet; Take 1 tablet (5 mg total) by mouth every evening.  Dispense: 90 tablet; Refill: 3           GAMA Mccarthy, FNP-C  Family/Internal Medicine  [facility]

## 2022-12-13 ENCOUNTER — OFFICE VISIT (OUTPATIENT)
Dept: PODIATRY | Facility: CLINIC | Age: 52
End: 2022-12-13
Payer: MEDICARE

## 2022-12-13 VITALS
HEIGHT: 64 IN | WEIGHT: 243.63 LBS | HEART RATE: 55 BPM | SYSTOLIC BLOOD PRESSURE: 127 MMHG | DIASTOLIC BLOOD PRESSURE: 80 MMHG | BODY MASS INDEX: 41.59 KG/M2

## 2022-12-13 DIAGNOSIS — B35.1 ONYCHOMYCOSIS DUE TO DERMATOPHYTE: ICD-10-CM

## 2022-12-13 DIAGNOSIS — E11.9 CONTROLLED TYPE 2 DIABETES MELLITUS WITHOUT COMPLICATION, WITHOUT LONG-TERM CURRENT USE OF INSULIN: Primary | ICD-10-CM

## 2022-12-13 PROCEDURE — 99999 PR PBB SHADOW E&M-EST. PATIENT-LVL III: CPT | Mod: PBBFAC,,, | Performed by: PODIATRIST

## 2022-12-13 PROCEDURE — 99999 PR PBB SHADOW E&M-EST. PATIENT-LVL III: ICD-10-PCS | Mod: PBBFAC,,, | Performed by: PODIATRIST

## 2022-12-13 PROCEDURE — 99213 OFFICE O/P EST LOW 20 MIN: CPT | Mod: PBBFAC,PO | Performed by: PODIATRIST

## 2022-12-13 PROCEDURE — 99203 PR OFFICE/OUTPT VISIT, NEW, LEVL III, 30-44 MIN: ICD-10-PCS | Mod: S$PBB,,, | Performed by: PODIATRIST

## 2022-12-13 PROCEDURE — 99203 OFFICE O/P NEW LOW 30 MIN: CPT | Mod: S$PBB,,, | Performed by: PODIATRIST

## 2022-12-15 NOTE — PROGRESS NOTES
Subjective:      Patient ID: Ava Driscoll is a 52 y.o. female.    Chief Complaint: Diabetes Mellitus (Endo [facility] 8/29/22), Nail Care, and Diabetic Foot Exam    Ava is a 52 y.o. female who presents to the clinic upon referral from Dr. Arana  for evaluation and treatment of diabetic feet. Ava has a past medical history of Behavioral problem, Colon polyps, Diabetes mellitus, History of psychiatric care, History of psychiatric hospitalization, psychiatric care, Loud snoring, Obese, Psychiatric problem, Retinopathy due to secondary diabetes, Schizophrenia, Severe anemia (12/16/2021), and Therapy. Presents for diabetic foot risk assessment.       PCP: Kayla Varela MD    Date Last Seen by PCP: per above    Current shoe gear: Tennis shoes    Hemoglobin A1C   Date Value Ref Range Status   08/30/2022 6.1 (H) 4.0 - 5.6 % Final     Comment:     ADA Screening Guidelines:  5.7-6.4%  Consistent with prediabetes  >or=6.5%  Consistent with diabetes    High levels of fetal hemoglobin interfere with the HbA1C  assay. Heterozygous hemoglobin variants (HbS, HgC, etc)do  not significantly interfere with this assay.   However, presence of multiple variants may affect accuracy.     04/22/2022 6.8 (H) 4.0 - 5.6 % Final     Comment:     ADA Screening Guidelines:  5.7-6.4%  Consistent with prediabetes  >or=6.5%  Consistent with diabetes    High levels of fetal hemoglobin interfere with the HbA1C  assay. Heterozygous hemoglobin variants (HbS, HgC, etc)do  not significantly interfere with this assay.   However, presence of multiple variants may affect accuracy.     11/11/2021 9.4 (H) 4.0 - 5.6 % Final     Comment:     ADA Screening Guidelines:  5.7-6.4%  Consistent with prediabetes  >or=6.5%  Consistent with diabetes    High levels of fetal hemoglobin interfere with the HbA1C  assay. Heterozygous hemoglobin variants (HbS, HgC, etc)do  not significantly interfere with this assay.   However, presence of multiple variants may affect  accuracy.           Review of Systems   Constitutional: Negative for chills.   Cardiovascular:  Negative for chest pain and claudication.   Respiratory:  Negative for cough.    Skin:  Positive for color change, dry skin and nail changes.   Musculoskeletal:  Positive for joint pain.   Gastrointestinal:  Negative for nausea.   Neurological:  Positive for paresthesias. Negative for numbness.   Psychiatric/Behavioral:  The patient is not nervous/anxious.          Objective:      Physical Exam  Constitutional:       Appearance: She is well-developed.      Comments: Oriented to time, place, and person.   Cardiovascular:      Comments: DP and PT pulses are palpable bilaterally. 3 sec capillary refill time and toes and feet are warm to touch proximally .  There is  hair growth on the feet and toes b/l. There is no edema b/l. No spider veins or varicosities present b/l.     Musculoskeletal:      Comments: Equinus noted b/l ankles with < 10 deg DF noted. MMT 5/5 in DF/PF/Inv/Ev resistance with no reproduction of pain in any direction. Passive range of motion of ankle and pedal joints is painless b/l.     Feet:      Right foot:      Skin integrity: No callus or dry skin.      Left foot:      Skin integrity: No callus or dry skin.   Lymphadenopathy:      Comments: Negative lymphadenopathy bilateral popliteal fossa and tarsal tunnel.   Skin:     Comments: No open lesions, lacerations or wounds noted.Interdigital spaces clean, dry and intact b/l. No erythema noted to b/l foot.  Nails normal color and trophic qualities.     Neurological:      Mental Status: She is alert.      Comments: Light touch, proprioception, and sharp/dull sensation are all intact bilaterally. Protective threshold with the Montrose-Wienstein monofilament is intact bilaterally.    Psychiatric:         Behavior: Behavior is cooperative.             Assessment:       Encounter Diagnoses   Name Primary?    Controlled type 2 diabetes mellitus without complication,  without long-term current use of insulin Yes    Onychomycosis due to dermatophyte          Plan:       Ava was seen today for diabetes mellitus, nail care and diabetic foot exam.    Diagnoses and all orders for this visit:    Controlled type 2 diabetes mellitus without complication, without long-term current use of insulin    Onychomycosis due to dermatophyte      I counseled the patient on her conditions, their implications and medical management.      - Shoe inspection. Diabetic Foot Education. Patient reminded of the importance of good nutrition and blood sugar control to help prevent podiatric complications of diabetes. Patient instructed on proper foot hygeine. We discussed wearing proper shoe gear, daily foot inspections, never walking without protective shoe gear, never putting sharp instruments to feet, routine podiatric nail visits every 12 months.   - With patient's permission, nails were aggressively reduced and debrided x 10 to their soft tissue attachment mechanically and with electric , removing all offending nail and debris. Patient relates relief following the procedure. He will continue to monitor the areas daily, inspect his feet, wear protective shoe gear when ambulatory, moisturizer to maintain skin integrity and follow in this office in approximately 12 months, sooner p.r.n.

## 2023-01-11 DIAGNOSIS — D69.6 THROMBOCYTOPENIA: ICD-10-CM

## 2023-01-11 DIAGNOSIS — D53.9 MACROCYTIC ANEMIA: ICD-10-CM

## 2023-01-11 DIAGNOSIS — E53.8 VITAMIN B12 DEFICIENCY: Primary | ICD-10-CM

## 2023-01-18 DIAGNOSIS — E11.9 TYPE 2 DIABETES MELLITUS WITHOUT COMPLICATION: ICD-10-CM

## 2023-01-23 ENCOUNTER — LAB VISIT (OUTPATIENT)
Dept: LAB | Facility: HOSPITAL | Age: 53
End: 2023-01-23
Attending: HOSPITALIST
Payer: MEDICARE

## 2023-01-23 DIAGNOSIS — E11.65 TYPE 2 DIABETES MELLITUS WITH HYPERGLYCEMIA, WITHOUT LONG-TERM CURRENT USE OF INSULIN: ICD-10-CM

## 2023-01-23 DIAGNOSIS — N25.81 SECONDARY HYPERPARATHYROIDISM: ICD-10-CM

## 2023-01-23 LAB
ANION GAP SERPL CALC-SCNC: 8 MMOL/L (ref 8–16)
BUN SERPL-MCNC: 21 MG/DL (ref 6–20)
CALCIUM SERPL-MCNC: 9.6 MG/DL (ref 8.7–10.5)
CHLORIDE SERPL-SCNC: 105 MMOL/L (ref 95–110)
CO2 SERPL-SCNC: 29 MMOL/L (ref 23–29)
CREAT SERPL-MCNC: 0.9 MG/DL (ref 0.5–1.4)
EST. GFR  (NO RACE VARIABLE): >60 ML/MIN/1.73 M^2
ESTIMATED AVG GLUCOSE: 128 MG/DL (ref 68–131)
GLUCOSE SERPL-MCNC: 107 MG/DL (ref 70–110)
HBA1C MFR BLD: 6.1 % (ref 4–5.6)
POTASSIUM SERPL-SCNC: 4.8 MMOL/L (ref 3.5–5.1)
PTH-INTACT SERPL-MCNC: 74.1 PG/ML (ref 9–77)
SODIUM SERPL-SCNC: 142 MMOL/L (ref 136–145)

## 2023-01-23 PROCEDURE — 83036 HEMOGLOBIN GLYCOSYLATED A1C: CPT | Performed by: HOSPITALIST

## 2023-01-23 PROCEDURE — 36415 COLL VENOUS BLD VENIPUNCTURE: CPT | Performed by: HOSPITALIST

## 2023-01-23 PROCEDURE — 80048 BASIC METABOLIC PNL TOTAL CA: CPT | Performed by: HOSPITALIST

## 2023-01-23 PROCEDURE — 83970 ASSAY OF PARATHORMONE: CPT | Performed by: HOSPITALIST

## 2023-01-30 ENCOUNTER — LAB VISIT (OUTPATIENT)
Dept: LAB | Facility: HOSPITAL | Age: 53
End: 2023-01-30
Attending: STUDENT IN AN ORGANIZED HEALTH CARE EDUCATION/TRAINING PROGRAM
Payer: MEDICARE

## 2023-01-30 ENCOUNTER — OFFICE VISIT (OUTPATIENT)
Dept: ENDOCRINOLOGY | Facility: CLINIC | Age: 53
End: 2023-01-30
Payer: MEDICARE

## 2023-01-30 VITALS
TEMPERATURE: 98 F | HEART RATE: 70 BPM | DIASTOLIC BLOOD PRESSURE: 89 MMHG | WEIGHT: 245.81 LBS | SYSTOLIC BLOOD PRESSURE: 122 MMHG | BODY MASS INDEX: 42.19 KG/M2

## 2023-01-30 DIAGNOSIS — E11.69 HYPERLIPIDEMIA ASSOCIATED WITH TYPE 2 DIABETES MELLITUS: ICD-10-CM

## 2023-01-30 DIAGNOSIS — N25.81 SECONDARY HYPERPARATHYROIDISM: ICD-10-CM

## 2023-01-30 DIAGNOSIS — E11.9 CONTROLLED TYPE 2 DIABETES MELLITUS WITHOUT COMPLICATION, WITHOUT LONG-TERM CURRENT USE OF INSULIN: Primary | ICD-10-CM

## 2023-01-30 DIAGNOSIS — E11.65 TYPE 2 DIABETES MELLITUS WITH HYPERGLYCEMIA, WITHOUT LONG-TERM CURRENT USE OF INSULIN: ICD-10-CM

## 2023-01-30 DIAGNOSIS — E66.01 CLASS 3 SEVERE OBESITY DUE TO EXCESS CALORIES WITH SERIOUS COMORBIDITY AND BODY MASS INDEX (BMI) OF 40.0 TO 44.9 IN ADULT: ICD-10-CM

## 2023-01-30 DIAGNOSIS — D69.6 THROMBOCYTOPENIA: ICD-10-CM

## 2023-01-30 DIAGNOSIS — E55.9 VITAMIN D DEFICIENCY: ICD-10-CM

## 2023-01-30 DIAGNOSIS — I10 ESSENTIAL HYPERTENSION: ICD-10-CM

## 2023-01-30 DIAGNOSIS — D53.9 MACROCYTIC ANEMIA: ICD-10-CM

## 2023-01-30 DIAGNOSIS — E78.5 HYPERLIPIDEMIA ASSOCIATED WITH TYPE 2 DIABETES MELLITUS: ICD-10-CM

## 2023-01-30 DIAGNOSIS — F20.0 PARANOID SCHIZOPHRENIA: ICD-10-CM

## 2023-01-30 DIAGNOSIS — E53.8 VITAMIN B12 DEFICIENCY: ICD-10-CM

## 2023-01-30 LAB
ERYTHROCYTE [DISTWIDTH] IN BLOOD BY AUTOMATED COUNT: 14.6 % (ref 11.5–14.5)
HCT VFR BLD AUTO: 48.2 % (ref 37–48.5)
HGB BLD-MCNC: 14.9 G/DL (ref 12–16)
IMM GRANULOCYTES # BLD AUTO: 0.03 K/UL (ref 0–0.04)
MCH RBC QN AUTO: 25.4 PG (ref 27–31)
MCHC RBC AUTO-ENTMCNC: 30.9 G/DL (ref 32–36)
MCV RBC AUTO: 82 FL (ref 82–98)
NEUTROPHILS # BLD AUTO: 3 K/UL (ref 1.8–7.7)
PLATELET # BLD AUTO: 314 K/UL (ref 150–450)
PMV BLD AUTO: 8.9 FL (ref 9.2–12.9)
RBC # BLD AUTO: 5.87 M/UL (ref 4–5.4)
VIT B12 SERPL-MCNC: 1380 PG/ML (ref 210–950)
WBC # BLD AUTO: 8.07 K/UL (ref 3.9–12.7)

## 2023-01-30 PROCEDURE — 99999 PR PBB SHADOW E&M-EST. PATIENT-LVL IV: ICD-10-PCS | Mod: PBBFAC,,, | Performed by: HOSPITALIST

## 2023-01-30 PROCEDURE — 99214 OFFICE O/P EST MOD 30 MIN: CPT | Mod: PBBFAC | Performed by: HOSPITALIST

## 2023-01-30 PROCEDURE — 85027 COMPLETE CBC AUTOMATED: CPT | Performed by: STUDENT IN AN ORGANIZED HEALTH CARE EDUCATION/TRAINING PROGRAM

## 2023-01-30 PROCEDURE — 99214 PR OFFICE/OUTPT VISIT, EST, LEVL IV, 30-39 MIN: ICD-10-PCS | Mod: S$PBB,,, | Performed by: HOSPITALIST

## 2023-01-30 PROCEDURE — 36415 COLL VENOUS BLD VENIPUNCTURE: CPT | Performed by: STUDENT IN AN ORGANIZED HEALTH CARE EDUCATION/TRAINING PROGRAM

## 2023-01-30 PROCEDURE — 99999 PR PBB SHADOW E&M-EST. PATIENT-LVL IV: CPT | Mod: PBBFAC,,, | Performed by: HOSPITALIST

## 2023-01-30 PROCEDURE — 99214 OFFICE O/P EST MOD 30 MIN: CPT | Mod: S$PBB,,, | Performed by: HOSPITALIST

## 2023-01-30 PROCEDURE — 82607 VITAMIN B-12: CPT | Performed by: STUDENT IN AN ORGANIZED HEALTH CARE EDUCATION/TRAINING PROGRAM

## 2023-01-30 RX ORDER — ERGOCALCIFEROL 1.25 MG/1
50000 CAPSULE ORAL
Qty: 13 CAPSULE | Refills: 3 | Status: SHIPPED | OUTPATIENT
Start: 2023-01-30 | End: 2024-03-20

## 2023-01-30 RX ORDER — METFORMIN HYDROCHLORIDE 1000 MG/1
1000 TABLET ORAL
Qty: 90 TABLET | Refills: 3 | Status: SHIPPED | OUTPATIENT
Start: 2023-01-30 | End: 2024-03-20

## 2023-01-30 NOTE — ASSESSMENT & PLAN NOTE
- Body mass index is 42.19 kg/m².  - dietary discussion as above  - continue to monitor weight  - continue Trulicity, encourage continue weight loss

## 2023-01-30 NOTE — ASSESSMENT & PLAN NOTE
- Lab work reviewed, and discussed with patient in clinic  - Currently on  Vit D2 [id number] qweekly  - lab work every 6 mo or yearly

## 2023-01-30 NOTE — PROGRESS NOTES
Subjective:      Patient ID: Ava Driscoll is a 52 y.o. female presented to [facility]  Chief Complaint:  Diabetes      History of Present Illness: Ava Driscoll is a 52 y.o. female here for type 2 diabetes  Other significant past medical history:  Schizophrenia (on Invega sustenna injection), obesity, HLD, recent COVID infection in 2020  Recent admission for:  anemia (s/p transfusion  RBC x 3 units on 12/17/2021), vitamin B 12 deficiency      Interval history:  Patient here for follow-up type 2 diabetes doing well.  A1c 6.1%  Tolerating Trulicity 1.5 mg once a week injection.  No issues with Jardiance, denies UTI  Glucose log review show stable glucose.  No change in Invega  Patient reports living at home by herself, eat predominant prepackage food.   Continue vitamin-D supplements, improvement in PTH      1) Diabetes Mellitus Type 2  Known diabetic complications: none  Diagnosed w/ DM: in 2013    Glucose log review show stable glucose:  No hypoglycemia  123  105  119  122  112  126  111    Current meds:   Metformin 1000 mg daily (unable to tolerate higher doses)  Jardiance 25 mg daily  Trulicity 1.5 mg Q weekly injection, sunday     Misses medication doses - yes  Injection Technique: Good  Rotation of injection site: Yes   Previous meds:    Glimepiride    Home glucose checks:  Difficult to check glucose, glucose logs available for review today   Hypoglycemia: denies  Diet/Exercise:               Eating 2 meals per day, skip breakfast, eat lunch and dinner, Lean Cuisine  Weight trend: stable  Diabetes Education: No  Diabetes Related Hospitalization:  No  Hx of pancreatitis, hx of thyroid cancer: No  Working: disabled     Statin: Taking, ACE/ARB: Not taking    Diabetes lab work  Lab Results   Component Value Date    HGBA1C 6.1 (H) 01/23/2023    HGBA1C 6.1 (H) 08/30/2022    HGBA1C 6.8 (H) 04/22/2022    HGBA1C 9.4 (H) 11/11/2021     No results found for: CPEPTIDE, GLUTAMICACID, ISLETCELLANT   Lab Results    Component Value Date    FRUCTOSAMINE 199 04/22/2022     Lab Results   Component Value Date    MICALBCREAT 16.4 11/11/2021     Lab Results   Component Value Date    BBYIRGWM13 <148 (L) 12/16/2021     Diabetes Management Status: Reviewed this office visit  Screening or Prevention Patient's value Goal Complete/Controlled?   Lipid profile : 08/30/2022 Annually Yes     Dilated retinal exam : 10/18/2021 Annually Yes     Foot exam   : 12/13/2022 Annually Yes         2) Hyperparathyroidism  - no hypercalcemia  - history of hypocalcemia  - not on hydrochlorothiazide   - vitamin-D deficiency noted   - repeat level: Results     Lab Results   Component Value Date    PTH 74.1 01/23/2023    .9 (H) 04/22/2022    .6 (H) 03/20/2019    [id number] 28 (L) 08/30/2022    XKVTZCSG95SG 7 (L) 11/11/2021    ERYLSARB43YZ 10 (L) 03/20/2019    CALCIUM 9.6 01/23/2023    CALCIUM 9.3 08/30/2022    CALCIUM 9.1 04/22/2022    PHOS 3.0 11/15/2011    PHOS 3.4 11/14/2011    ALKPHOS 131 04/22/2022    ALKPHOS 53 (L) 12/18/2021    ALKPHOS 57 12/16/2021        Reviewed past surgical, medical, family, social history and updated as appropriate.  Review of Systems: see HPI above  Objective:   /89 (BP Location: Left arm)   Pulse 70   Temp 98.2 °F (36.8 °C) (Oral)   Wt 111.5 kg (245 lb 12.8 oz)   BMI 42.19 kg/m²     Body mass index is 42.19 kg/m².  Vital signs reviewed    Physical Exam  Vitals and nursing note reviewed.   Constitutional:       Appearance: Normal appearance. She is well-developed. She is obese. She is not ill-appearing.   Neck:      Thyroid: No thyromegaly.   Pulmonary:      Effort: Pulmonary effort is normal. No respiratory distress.   Musculoskeletal:         General: Normal range of motion.      Cervical back: Normal range of motion.   Neurological:      General: No focal deficit present.      Mental Status: She is alert. Mental status is at baseline.   Psychiatric:         Mood and Affect: Mood normal.          Behavior: Behavior normal.     Lab Reviewed:   Lab Results   Component Value Date    CHOL 144 08/30/2022    HDL 39 (L) 08/30/2022    LDLCALC 87.8 08/30/2022    TRIG 86 08/30/2022    CHOLHDL 27.1 08/30/2022     Lab Results   Component Value Date     01/23/2023    K 4.8 01/23/2023     01/23/2023    CO2 29 01/23/2023     01/23/2023    BUN 21 (H) 01/23/2023    CREATININE 0.9 01/23/2023    CALCIUM 9.6 01/23/2023    PROT 8.0 04/22/2022    ALBUMIN 3.8 04/22/2022    BILITOT 0.6 04/22/2022    ALKPHOS 131 04/22/2022    AST 13 04/22/2022    ALT 13 04/22/2022    ANIONGAP 8 01/23/2023    ESTGFRAFRICA >60 04/22/2022    EGFRNONAA 58 (A) 04/22/2022    TSH 2.927 12/18/2021     Assessment     1. Controlled type 2 diabetes mellitus without complication, without long-term current use of insulin  metFORMIN (GLUCOPHAGE) 1000 MG tablet    empagliflozin (JARDIANCE) 25 mg tablet    Basic Metabolic Panel    Hemoglobin A1C      2. Class 3 severe obesity due to excess calories with serious comorbidity and body mass index (BMI) of 40.0 to 44.9 in adult        3. Secondary hyperparathyroidism        4. Paranoid schizophrenia        5. Essential hypertension        6. Hyperlipidemia associated with type 2 diabetes mellitus        7. Type 2 diabetes mellitus with hyperglycemia, without long-term current use of insulin        8. Vitamin D deficiency  ergocalciferol (ERGOCALCIFEROL) 50,000 unit Cap    Vitamin D        Plan     Controlled type 2 diabetes mellitus without complication, without long-term current use of insulin  - Diabetes is controlled given glucose log, suspect A1c will be at goal A1C  - A1c goal for patient is <7%  - Complicated by schizophrenia and the injectable Invega sustanna which has been known to lead to hyperglycemia  - has been doing much better job at glycemic control.  Glucose log review show very stable glucose no hypoglycemia  - Continue reinforcement of dietary modification, portion size control,  decreasing carbohydrates intake  - encourage patient for continue weight loss    Plan  - Continue metformin 1000 mg daily (unable to tolerate higher doses), continue Jardiance 25 mg daily  - continue Trulicity to 1.5 mg once a week injection  - advised pt to check glucoses 1-2x a day, glucose logs given in clinic, glucometer and testing strips sent to pharmacy  - Clear written instruction given on AVS and Follow up as schedule  - follow-up with 4-5 months, check A1c    Class 3 severe obesity due to excess calories with serious comorbidity and body mass index (BMI) of 40.0 to 44.9 in adult  - Body mass index is 42.19 kg/m².  - dietary discussion as above  - continue to monitor weight  - continue Trulicity, encourage continue weight loss    Essential hypertension  - Chronic, blood pressure reviewed  - Continue management by PCP    Paranoid schizophrenia  - patient on Invega sustained a which has been shown to cause hyperglycemia  - continue follow-up with psychiatry  - difficult to manage diabetes in setting of paranoid/schizophrenic    Secondary hyperparathyroidism  - no history of hypercalcemia, suspect elevated PTH in setting of hypocalcemia noted in the past  - PTH trending down since improvement in vitamin-D  - does not fit criteria for parathyroidectomy  - monitoring routine    Vitamin D deficiency  - Lab work reviewed, and discussed with patient in clinic  - Currently on  Vit D2 [id number] qweekly  - lab work every 6 mo or yearly      RTC in 5-6 months    Yvon Goldstein M.D  Endocrinology  [facility]  1/30/2023      Disclaimer: This note has been generated using voice-recognition software. There may be typographical errors that have been missed during proof-reading.

## 2023-01-30 NOTE — ASSESSMENT & PLAN NOTE
- no history of hypercalcemia, suspect elevated PTH in setting of hypocalcemia noted in the past  - PTH trending down since improvement in vitamin-D  - does not fit criteria for parathyroidectomy  - monitoring routine

## 2023-01-30 NOTE — ASSESSMENT & PLAN NOTE
- Diabetes is controlled given glucose log, suspect A1c will be at goal A1C  - A1c goal for patient is <7%  - Complicated by schizophrenia and the injectable Invega sustanna which has been known to lead to hyperglycemia  - has been doing much better job at glycemic control.  Glucose log review show very stable glucose no hypoglycemia  - Continue reinforcement of dietary modification, portion size control, decreasing carbohydrates intake  - encourage patient for continue weight loss    Plan  - Continue metformin 1000 mg daily (unable to tolerate higher doses), continue Jardiance 25 mg daily  - continue Trulicity to 1.5 mg once a week injection  - advised pt to check glucoses 1-2x a day, glucose logs given in clinic, glucometer and testing strips sent to pharmacy  - Clear written instruction given on AVS and Follow up as schedule  - follow-up with 4-5 months, check A1c

## 2023-01-30 NOTE — PATIENT INSTRUCTIONS
Thank you for completing the visit with me    Following changes were made today:    Metformin 1000mg once a day   Jardiance 25 mg daily  Trulicity 1.5mg once a week injection    Take Vit D Ergocalciferol once a week       Goal for your blood sugar   In the morning before breakfast:   Before going to bed: 100-140  do not go to bed with glucose less than 100         Please check glucose 1 times a day (before breakfast). 4 times a week  Glucose logs given in clinic, please filled out and bring back to next office visit for me to review  Without this at the next visit, I cannot make any meaningful adjustment to your regimen.  Document any hypoglycemia  episode with date and time for me to review, glucose less than 60    Please make sure to get your dilated eyes examine once a year with you eye doctor.  Monitor your feet for any cuts or skin breakdown regularly    Please CALL MY OFFICE and leave a message if your insurance does not cover the medications I prescribed, so I can prescribed alternative medications.     We will plan an in-clinic visit in 6 months, with labs prior to that appointment.      Yvon Goldstein M.D  [facility]  Endocrinology  [address]  [place] [address]    Office:  [phone]  Fax:  [phone]

## 2023-02-13 ENCOUNTER — OFFICE VISIT (OUTPATIENT)
Dept: HEMATOLOGY/ONCOLOGY | Facility: CLINIC | Age: 53
End: 2023-02-13
Payer: MEDICARE

## 2023-02-13 VITALS
SYSTOLIC BLOOD PRESSURE: 115 MMHG | WEIGHT: 247.13 LBS | HEART RATE: 56 BPM | BODY MASS INDEX: 42.19 KG/M2 | HEIGHT: 64 IN | DIASTOLIC BLOOD PRESSURE: 87 MMHG | OXYGEN SATURATION: 98 %

## 2023-02-13 DIAGNOSIS — E53.8 VITAMIN B12 DEFICIENCY: Primary | ICD-10-CM

## 2023-02-13 DIAGNOSIS — Z76.0 MEDICATION REFILL: ICD-10-CM

## 2023-02-13 DIAGNOSIS — E11.9 CONTROLLED TYPE 2 DIABETES MELLITUS WITHOUT COMPLICATION, WITHOUT LONG-TERM CURRENT USE OF INSULIN: ICD-10-CM

## 2023-02-13 DIAGNOSIS — F20.0 PARANOID SCHIZOPHRENIA: ICD-10-CM

## 2023-02-13 DIAGNOSIS — I10 ESSENTIAL HYPERTENSION: ICD-10-CM

## 2023-02-13 PROCEDURE — 99999 PR PBB SHADOW E&M-EST. PATIENT-LVL III: ICD-10-PCS | Mod: PBBFAC,,, | Performed by: STUDENT IN AN ORGANIZED HEALTH CARE EDUCATION/TRAINING PROGRAM

## 2023-02-13 PROCEDURE — 99213 OFFICE O/P EST LOW 20 MIN: CPT | Mod: PBBFAC | Performed by: STUDENT IN AN ORGANIZED HEALTH CARE EDUCATION/TRAINING PROGRAM

## 2023-02-13 PROCEDURE — 99214 PR OFFICE/OUTPT VISIT, EST, LEVL IV, 30-39 MIN: ICD-10-PCS | Mod: S$PBB,,, | Performed by: STUDENT IN AN ORGANIZED HEALTH CARE EDUCATION/TRAINING PROGRAM

## 2023-02-13 PROCEDURE — 99214 OFFICE O/P EST MOD 30 MIN: CPT | Mod: S$PBB,,, | Performed by: STUDENT IN AN ORGANIZED HEALTH CARE EDUCATION/TRAINING PROGRAM

## 2023-02-13 PROCEDURE — 99999 PR PBB SHADOW E&M-EST. PATIENT-LVL III: CPT | Mod: PBBFAC,,, | Performed by: STUDENT IN AN ORGANIZED HEALTH CARE EDUCATION/TRAINING PROGRAM

## 2023-02-13 RX ORDER — CYANOCOBALAMIN 1000 UG/ML
1000 INJECTION, SOLUTION INTRAMUSCULAR; SUBCUTANEOUS
Qty: 1 ML | Refills: 11 | Status: SHIPPED | OUTPATIENT
Start: 2023-02-13 | End: 2024-02-13

## 2023-02-13 NOTE — PROGRESS NOTES
PATIENT: Ava Driscoll  MRN: [id number]  DATE: 2023      Diagnosis:   1. Vitamin B12 deficiency    2. Paranoid schizophrenia    3. Essential hypertension    4. Controlled type 2 diabetes mellitus without complication, without long-term current use of insulin    5. Medication refill          Chief Complaint: Follow-up, b12 deficiency, and Medication Refill      Oncologic History:   Oncologic History    Oncologic History      Oncologic Treatment      Pathology          Subjective:    Interval History: Ms. Driscoll returns for follow up.     53 year old woman with schizophrenia, hypertension, diabetes, hyperlipidemia, and obesity is here for follow up for b12 deficiency.      Pt doing well and currently on monthly b12 injections.  She feels well and denied any complaints.  B12 shots refilled.    She is alone at this visit.    Past Medical History:   Past Medical History:   Diagnosis Date    Behavioral problem     Colon polyps     Diabetes mellitus     History of psychiatric care     History of psychiatric hospitalization     Hx of psychiatric care     Loud snoring     Obese     Psychiatric problem     Retinopathy due to secondary diabetes     Schizophrenia     Severe anemia 2021    Therapy        Past Surgical HIstory:   Past Surgical History:   Procedure Laterality Date     SECTION      COLONOSCOPY N/A 2020    Procedure: COLONOSCOPY;  Surgeon: Edvin Zuniga II, MD;  Location: [facility];  Service: Endoscopy;  Laterality: N/A;    HYSTERECTOMY         Family History:   Family History   Problem Relation Age of Onset    Arthritis Mother     Diabetes type II Father     Breast cancer Maternal Aunt 80        unilat    Ovarian cancer Neg Hx        Social History:  reports that she has never smoked. She has never used smokeless tobacco. She reports that she does not drink alcohol and does not use drugs.    Allergies:  Review of patient's allergies indicates:   Allergen Reactions    Ace inhibitors Other  (See Comments)     Cough       Medications:  Current Outpatient Medications   Medication Sig Dispense Refill    blood sugar diagnostic Strp One test strip use 2 times a day to check blood glucose,  ICD-10: E11.9, compatible with insurance/glucometer 100 each 4    blood-glucose meter kit One glucometer, use to check 2 times a day.   ICD-10: E11.9, Dispense machine covered by insurance 1 each 0    cyanocobalamin 1,000 mcg/mL injection Inject 1 mL (1,000 mcg total) into the muscle every 30 days. 1 mL 11    dulaglutide (TRULICITY) 1.5 mg/0.5 mL pen injector Inject 1.5 mg into the skin once a week. 4 pen 11    empagliflozin (JARDIANCE) 25 mg tablet Take 1 tablet (25 mg total) by mouth once daily. 90 tablet 2    ergocalciferol (ERGOCALCIFEROL) 50,000 unit Cap Take 1 capsule (50,000 Units total) by mouth every 7 days. 13 capsule 3    fluticasone propionate (FLONASE) 50 mcg/actuation nasal spray 2 sprays (100 mcg total) by Each Nostril route 2 (two) times a day. 16 g 2    INVEGA SUSTENNA 156 mg/mL Syrg injection       lancets Misc One lancets use 2 times a day to check blood glucose, ICD-10: E11.9 (2)/E10.9 (1) 100 each 4    lancing device Misc One device, used to check blood glucose, ICD-10: E11.9 1 each 0    levocetirizine (XYZAL) 5 MG tablet Take 1 tablet (5 mg total) by mouth every evening. 90 tablet 3    losartan (COZAAR) 25 MG tablet Take 1 tablet (25 mg total) by mouth once daily. HOLD UNTIL SEEN BY PRIMARY CARE PROVIDER. (Patient not taking: Reported on 4/29/2022) 90 tablet 1    metFORMIN (GLUCOPHAGE) 1000 MG tablet Take 1 tablet (1,000 mg total) by mouth daily with breakfast. 90 tablet 3    pravastatin (PRAVACHOL) 40 MG tablet TAKE 1 TABLET BY MOUTH EVERY DAY 90 tablet 1     No current facility-administered medications for this visit.       Review of Systems   Constitutional:  Negative for activity change, appetite change, chills, diaphoresis, fatigue, fever and unexpected weight change.   HENT:  Negative for  "nosebleeds and trouble swallowing.    Eyes:  Negative for visual disturbance.   Respiratory:  Negative for cough, chest tightness, shortness of breath and wheezing.    Cardiovascular:  Negative for chest pain and leg swelling.   Gastrointestinal:  Negative for abdominal distention, abdominal pain, blood in stool, constipation, diarrhea, nausea and vomiting.   Endocrine: Negative for cold intolerance and heat intolerance.   Genitourinary:  Negative for difficulty urinating and dysuria.   Musculoskeletal:  Negative for arthralgias and back pain.   Skin:  Negative for color change.   Neurological:  Negative for dizziness, weakness, light-headedness, numbness and headaches.   Hematological:  Negative for adenopathy. Does not bruise/bleed easily.   Psychiatric/Behavioral:  Negative for confusion.      ECOG Performance Status:     ECOG SCORE             Objective:      Vitals:   Vitals:    02/13/23 0844   BP: 115/87   BP Location: Left arm   Patient Position: Sitting   BP Method: Large (Automatic)   Pulse: (!) 56   SpO2: 98%   Weight: 112.1 kg (247 lb 2.2 oz)   Height: 5' 4" (1.626 m)       BMI: Body mass index is 42.42 kg/m².    Physical Exam  Constitutional:       General: She is not in acute distress.     Appearance: She is obese. She is not ill-appearing.   HENT:      Head: Normocephalic and atraumatic.      Mouth/Throat:      Pharynx: No oropharyngeal exudate or posterior oropharyngeal erythema.   Eyes:      General: No scleral icterus.     Extraocular Movements: Extraocular movements intact.      Conjunctiva/sclera: Conjunctivae normal.      Pupils: Pupils are equal, round, and reactive to light.   Cardiovascular:      Rate and Rhythm: Normal rate and regular rhythm.      Heart sounds: No murmur heard.    No friction rub. No gallop.   Pulmonary:      Effort: Pulmonary effort is normal. No respiratory distress.      Breath sounds: No stridor. No wheezing, rhonchi or rales.   Abdominal:      General: Bowel sounds are " normal. There is no distension.      Palpations: Abdomen is soft. There is no mass.      Tenderness: There is no abdominal tenderness. There is no guarding or rebound.   Musculoskeletal:         General: Normal range of motion.      Cervical back: Normal range of motion and neck supple.      Right lower leg: No edema.      Left lower leg: No edema.   Skin:     General: Skin is warm and dry.   Neurological:      General: No focal deficit present.      Mental Status: She is alert.       Laboratory Data:   No results found for this or any previous visit (from the past 168 hour(s)).        Imaging:       Assessment/Plan           Vitamin B12 deficiency - Primary  -Cytopenias completed resolved. Intrinsic factor antibody positive.  -Counseled patient that she needs to be on vitamin B12 supplementation for life  -Discussed with her oral vs IM supplementation. She prefers to continue IM.  She can transition to monthly injections.  -B12 shots refilled today  -labs in 6 months.  -labs and follow up in 1 year.      Schizophrenia  -Stable  -Per [facility]      HTN  -Stable  -Per PCP      DM  -Stable  -Per PCP    Orders Placed This Encounter   Procedures    CBC W/ AUTO DIFFERENTIAL    METHYLMALONIC ACID, SERUM    VITAMIN B12    CBC W/ AUTO DIFFERENTIAL    METHYLMALONIC ACID, SERUM    VITAMIN B12               Remington Valencia MD  Hematology Oncology

## 2023-02-13 NOTE — Clinical Note
-Return for CBC, MMA b12 labs only in 6 months  -RTC in 1 year for CBC, MMA, B12 and MD visit (please get labs week before MD visit)

## 2023-06-19 ENCOUNTER — TELEPHONE (OUTPATIENT)
Dept: FAMILY MEDICINE | Facility: CLINIC | Age: 53
End: 2023-06-19
Payer: MEDICARE

## 2023-06-19 NOTE — TELEPHONE ENCOUNTER
Patient's last visit has been greater than 1 year. Appointment scheduled with PCP for 6/29/23 at 9:40 am.

## 2023-06-19 NOTE — TELEPHONE ENCOUNTER
----- Message from Paz Garrido sent at 6/19/2023 12:09 PM CDT -----  Regarding: Patient Call Back  .Type: Patient Call Back    Who called: Self     What is the request in detail: Pt called to ask provider to send over a cream/ointment for eczema to her preferred pharmacy. Pt would also like a call back. Please advise.    Can the clinic reply by [facility]? No    Would the patient rather a call back or a response via [facility]? Call back    Best call back number: [phone]     Additional Information:

## 2023-08-17 ENCOUNTER — LAB VISIT (OUTPATIENT)
Dept: LAB | Facility: HOSPITAL | Age: 53
End: 2023-08-17
Attending: STUDENT IN AN ORGANIZED HEALTH CARE EDUCATION/TRAINING PROGRAM
Payer: MEDICARE

## 2023-08-17 ENCOUNTER — TELEPHONE (OUTPATIENT)
Dept: HEMATOLOGY/ONCOLOGY | Facility: CLINIC | Age: 53
End: 2023-08-17
Payer: MEDICARE

## 2023-08-17 DIAGNOSIS — E53.8 VITAMIN B12 DEFICIENCY: ICD-10-CM

## 2023-08-17 LAB
BASOPHILS # BLD AUTO: 0.05 K/UL (ref 0–0.2)
BASOPHILS NFR BLD: 0.7 % (ref 0–1.9)
DIFFERENTIAL METHOD: ABNORMAL
EOSINOPHIL # BLD AUTO: 0.2 K/UL (ref 0–0.5)
EOSINOPHIL NFR BLD: 2 % (ref 0–8)
ERYTHROCYTE [DISTWIDTH] IN BLOOD BY AUTOMATED COUNT: 14.6 % (ref 11.5–14.5)
HCT VFR BLD AUTO: 43.8 % (ref 37–48.5)
HGB BLD-MCNC: 13.4 G/DL (ref 12–16)
IMM GRANULOCYTES # BLD AUTO: 0.02 K/UL (ref 0–0.04)
IMM GRANULOCYTES NFR BLD AUTO: 0.3 % (ref 0–0.5)
LYMPHOCYTES # BLD AUTO: 3.6 K/UL (ref 1–4.8)
LYMPHOCYTES NFR BLD: 46.8 % (ref 18–48)
MCH RBC QN AUTO: 25.1 PG (ref 27–31)
MCHC RBC AUTO-ENTMCNC: 30.6 G/DL (ref 32–36)
MCV RBC AUTO: 82 FL (ref 82–98)
MONOCYTES # BLD AUTO: 0.6 K/UL (ref 0.3–1)
MONOCYTES NFR BLD: 7.4 % (ref 4–15)
NEUTROPHILS # BLD AUTO: 3.3 K/UL (ref 1.8–7.7)
NEUTROPHILS NFR BLD: 42.8 % (ref 38–73)
NRBC BLD-RTO: 0 /100 WBC
PLATELET # BLD AUTO: 251 K/UL (ref 150–450)
PMV BLD AUTO: 9.1 FL (ref 9.2–12.9)
RBC # BLD AUTO: 5.33 M/UL (ref 4–5.4)
VIT B12 SERPL-MCNC: 1186 PG/ML (ref 210–950)
WBC # BLD AUTO: 7.67 K/UL (ref 3.9–12.7)

## 2023-08-17 PROCEDURE — 36415 COLL VENOUS BLD VENIPUNCTURE: CPT | Performed by: STUDENT IN AN ORGANIZED HEALTH CARE EDUCATION/TRAINING PROGRAM

## 2023-08-17 PROCEDURE — 85025 COMPLETE CBC W/AUTO DIFF WBC: CPT | Performed by: STUDENT IN AN ORGANIZED HEALTH CARE EDUCATION/TRAINING PROGRAM

## 2023-08-17 PROCEDURE — 82607 VITAMIN B-12: CPT | Performed by: STUDENT IN AN ORGANIZED HEALTH CARE EDUCATION/TRAINING PROGRAM

## 2023-08-17 PROCEDURE — 83921 ORGANIC ACID SINGLE QUANT: CPT | Performed by: STUDENT IN AN ORGANIZED HEALTH CARE EDUCATION/TRAINING PROGRAM

## 2023-08-22 LAB — METHYLMALONATE SERPL-SCNC: 0.11 UMOL/L

## 2023-08-23 DIAGNOSIS — E78.5 HYPERLIPIDEMIA ASSOCIATED WITH TYPE 2 DIABETES MELLITUS: ICD-10-CM

## 2023-08-23 DIAGNOSIS — E11.69 HYPERLIPIDEMIA ASSOCIATED WITH TYPE 2 DIABETES MELLITUS: ICD-10-CM

## 2023-08-23 NOTE — TELEPHONE ENCOUNTER
----- Message from Priscilla Hardin sent at 8/23/2023  1:26 PM CDT -----  Regarding: Refill Request  Type: RX Refill Request      Who Called: Self       Refill or New Rx: refill       RX Name and Strength: pravastatin (PRAVACHOL) 40 MG tablet      Preferred Pharmacy with phone number:  .  [facility]/pharmacy #[id number] - [place] - [address]  [address] [place] [address]  Phone: [phone] Fax: [phone]        Would the patient rather a call back or a response via My [facility]? Call       Best Call Back Number: .[phone]

## 2023-08-23 NOTE — TELEPHONE ENCOUNTER
Care Due:                  Date            Visit Type   Department     Provider  --------------------------------------------------------------------------------                                             [facility]     MEDICINE/INTERN  Last Visit: 01-      FOLLOW UP    AL GABRIELLA ROYAL -         [facility]      MEDICINE/INTERN  Next Visit: 09-      CARE ([facility])   AL MED         Kayla Adamson                                                            Last  Test          Frequency    Reason                     Performed    Due Date  --------------------------------------------------------------------------------    CMP.........  12 months..  pravastatin..............  04- 04-    Lipid Panel.  12 months..  pravastatin..............  08- 08-    [facility] Embedded Care Due Messages. Reference number: [id number].   8/23/2023 1:44:33 PM CDT

## 2023-08-24 RX ORDER — PRAVASTATIN SODIUM 40 MG/1
40 TABLET ORAL DAILY
Qty: 90 TABLET | Refills: 1 | Status: SHIPPED | OUTPATIENT
Start: 2023-08-24 | End: 2024-02-27 | Stop reason: SDUPTHER

## 2023-09-26 ENCOUNTER — LAB VISIT (OUTPATIENT)
Dept: LAB | Facility: HOSPITAL | Age: 53
End: 2023-09-26
Attending: HOSPITALIST
Payer: MEDICARE

## 2023-09-26 ENCOUNTER — OFFICE VISIT (OUTPATIENT)
Dept: FAMILY MEDICINE | Facility: CLINIC | Age: 53
End: 2023-09-26
Payer: MEDICARE

## 2023-09-26 VITALS
DIASTOLIC BLOOD PRESSURE: 86 MMHG | HEART RATE: 53 BPM | HEIGHT: 64 IN | WEIGHT: 243.63 LBS | SYSTOLIC BLOOD PRESSURE: 110 MMHG | BODY MASS INDEX: 41.59 KG/M2 | TEMPERATURE: 98 F | OXYGEN SATURATION: 98 %

## 2023-09-26 DIAGNOSIS — E11.69 HYPERLIPIDEMIA ASSOCIATED WITH TYPE 2 DIABETES MELLITUS: ICD-10-CM

## 2023-09-26 DIAGNOSIS — E11.65 TYPE 2 DIABETES MELLITUS WITH HYPERGLYCEMIA, WITHOUT LONG-TERM CURRENT USE OF INSULIN: ICD-10-CM

## 2023-09-26 DIAGNOSIS — D64.9 SEVERE ANEMIA: Primary | ICD-10-CM

## 2023-09-26 DIAGNOSIS — Z12.31 ENCOUNTER FOR SCREENING MAMMOGRAM FOR BREAST CANCER: ICD-10-CM

## 2023-09-26 DIAGNOSIS — E78.5 HYPERLIPIDEMIA ASSOCIATED WITH TYPE 2 DIABETES MELLITUS: ICD-10-CM

## 2023-09-26 DIAGNOSIS — E55.9 VITAMIN D DEFICIENCY: ICD-10-CM

## 2023-09-26 DIAGNOSIS — F20.0 SCHIZOPHRENIA, PARANOID: ICD-10-CM

## 2023-09-26 DIAGNOSIS — L30.9 ECZEMA, UNSPECIFIED TYPE: ICD-10-CM

## 2023-09-26 LAB
25(OH)D3+25(OH)D2 SERPL-MCNC: 29 NG/ML (ref 30–96)
ALBUMIN SERPL BCP-MCNC: 3.6 G/DL (ref 3.5–5.2)
ALP SERPL-CCNC: 71 U/L (ref 55–135)
ALT SERPL W/O P-5'-P-CCNC: 12 U/L (ref 10–44)
ANION GAP SERPL CALC-SCNC: 8 MMOL/L (ref 8–16)
AST SERPL-CCNC: 15 U/L (ref 10–40)
BASOPHILS # BLD AUTO: 0.06 K/UL (ref 0–0.2)
BASOPHILS NFR BLD: 0.8 % (ref 0–1.9)
BILIRUB SERPL-MCNC: 0.5 MG/DL (ref 0.1–1)
BUN SERPL-MCNC: 16 MG/DL (ref 6–20)
CALCIUM SERPL-MCNC: 9 MG/DL (ref 8.7–10.5)
CHLORIDE SERPL-SCNC: 108 MMOL/L (ref 95–110)
CHOLEST SERPL-MCNC: 144 MG/DL (ref 120–199)
CHOLEST/HDLC SERPL: 3.5 {RATIO} (ref 2–5)
CO2 SERPL-SCNC: 26 MMOL/L (ref 23–29)
CREAT SERPL-MCNC: 0.9 MG/DL (ref 0.5–1.4)
DIFFERENTIAL METHOD: ABNORMAL
EOSINOPHIL # BLD AUTO: 0.2 K/UL (ref 0–0.5)
EOSINOPHIL NFR BLD: 3 % (ref 0–8)
ERYTHROCYTE [DISTWIDTH] IN BLOOD BY AUTOMATED COUNT: 14.6 % (ref 11.5–14.5)
EST. GFR  (NO RACE VARIABLE): >60 ML/MIN/1.73 M^2
ESTIMATED AVG GLUCOSE: 117 MG/DL (ref 68–131)
GLUCOSE SERPL-MCNC: 88 MG/DL (ref 70–110)
HBA1C MFR BLD: 5.7 % (ref 4–5.6)
HCT VFR BLD AUTO: 49.3 % (ref 37–48.5)
HDLC SERPL-MCNC: 41 MG/DL (ref 40–75)
HDLC SERPL: 28.5 % (ref 20–50)
HGB BLD-MCNC: 14.5 G/DL (ref 12–16)
IMM GRANULOCYTES # BLD AUTO: 0.02 K/UL (ref 0–0.04)
IMM GRANULOCYTES NFR BLD AUTO: 0.3 % (ref 0–0.5)
LDLC SERPL CALC-MCNC: 91.6 MG/DL (ref 63–159)
LYMPHOCYTES # BLD AUTO: 4 K/UL (ref 1–4.8)
LYMPHOCYTES NFR BLD: 52.2 % (ref 18–48)
MCH RBC QN AUTO: 25.1 PG (ref 27–31)
MCHC RBC AUTO-ENTMCNC: 29.4 G/DL (ref 32–36)
MCV RBC AUTO: 85 FL (ref 82–98)
MONOCYTES # BLD AUTO: 0.6 K/UL (ref 0.3–1)
MONOCYTES NFR BLD: 7.3 % (ref 4–15)
NEUTROPHILS # BLD AUTO: 2.8 K/UL (ref 1.8–7.7)
NEUTROPHILS NFR BLD: 36.4 % (ref 38–73)
NONHDLC SERPL-MCNC: 103 MG/DL
NRBC BLD-RTO: 0 /100 WBC
PLATELET # BLD AUTO: 301 K/UL (ref 150–450)
PMV BLD AUTO: 9.8 FL (ref 9.2–12.9)
POTASSIUM SERPL-SCNC: 4.6 MMOL/L (ref 3.5–5.1)
PROT SERPL-MCNC: 7.5 G/DL (ref 6–8.4)
RBC # BLD AUTO: 5.77 M/UL (ref 4–5.4)
SODIUM SERPL-SCNC: 142 MMOL/L (ref 136–145)
TRIGL SERPL-MCNC: 57 MG/DL (ref 30–150)
WBC # BLD AUTO: 7.7 K/UL (ref 3.9–12.7)

## 2023-09-26 PROCEDURE — 99215 OFFICE O/P EST HI 40 MIN: CPT | Mod: PBBFAC,PO | Performed by: FAMILY MEDICINE

## 2023-09-26 PROCEDURE — 80053 COMPREHEN METABOLIC PANEL: CPT | Performed by: FAMILY MEDICINE

## 2023-09-26 PROCEDURE — 99999 PR PBB SHADOW E&M-EST. PATIENT-LVL V: CPT | Mod: PBBFAC,,, | Performed by: FAMILY MEDICINE

## 2023-09-26 PROCEDURE — 99214 PR OFFICE/OUTPT VISIT, EST, LEVL IV, 30-39 MIN: ICD-10-PCS | Mod: S$PBB,,, | Performed by: FAMILY MEDICINE

## 2023-09-26 PROCEDURE — 99214 OFFICE O/P EST MOD 30 MIN: CPT | Mod: S$PBB,,, | Performed by: FAMILY MEDICINE

## 2023-09-26 PROCEDURE — 36415 COLL VENOUS BLD VENIPUNCTURE: CPT | Mod: PO | Performed by: HOSPITALIST

## 2023-09-26 PROCEDURE — 83036 HEMOGLOBIN GLYCOSYLATED A1C: CPT | Performed by: FAMILY MEDICINE

## 2023-09-26 PROCEDURE — 82306 VITAMIN D 25 HYDROXY: CPT | Performed by: HOSPITALIST

## 2023-09-26 PROCEDURE — 99999 PR PBB SHADOW E&M-EST. PATIENT-LVL V: ICD-10-PCS | Mod: PBBFAC,,, | Performed by: FAMILY MEDICINE

## 2023-09-26 PROCEDURE — 80061 LIPID PANEL: CPT | Performed by: FAMILY MEDICINE

## 2023-09-26 PROCEDURE — 85025 COMPLETE CBC W/AUTO DIFF WBC: CPT | Performed by: FAMILY MEDICINE

## 2023-09-26 RX ORDER — TRIAMCINOLONE ACETONIDE 1 MG/G
CREAM TOPICAL 2 TIMES DAILY
Qty: 453 G | Refills: 1 | Status: SHIPPED | OUTPATIENT
Start: 2023-09-26

## 2023-09-26 NOTE — PROGRESS NOTES
Subjective     Patient ID: Ava Driscoll is a 52 y.o. female.    Chief Complaint: Annual Exam    52 year old here for follow up. She has diabetes and her sugars are controlled. Her numbers in there morning range from 90's -110's. She denies hypoclycemia. She is doing well.       Past Medical History:   Diagnosis Date    Behavioral problem     Colon polyps     Diabetes mellitus     History of psychiatric care     History of psychiatric hospitalization     Hx of psychiatric care     Loud snoring     Obese     Psychiatric problem     Retinopathy due to secondary diabetes     Schizophrenia     Severe anemia 2021    Therapy       Past Surgical History:   Procedure Laterality Date     SECTION      COLONOSCOPY N/A 2020    Procedure: COLONOSCOPY;  Surgeon: Edvin Zuniga II, MD;  Location: [facility];  Service: Endoscopy;  Laterality: N/A;    HYSTERECTOMY       Family History   Problem Relation Age of Onset    Arthritis Mother     Diabetes type II Father     Breast cancer Maternal Aunt 80        unilat    Ovarian cancer Neg Hx      Social History     Socioeconomic History    Marital status: Single    Number of children: 2   Tobacco Use    Smoking status: Former     Types: Cigarettes    Smokeless tobacco: Never   Substance and Sexual Activity    Alcohol use: No    Drug use: Never     Social Determinants of Health     Stress: No Stress Concern Present (2019)    [employer] - Occupational Stress Questionnaire     Feeling of Stress : Not at all       Review of Systems   Constitutional:  Negative for fatigue.   Respiratory:  Negative for cough, chest tightness and shortness of breath.    Cardiovascular:  Negative for chest pain, palpitations and leg swelling.   Gastrointestinal:  Negative for abdominal pain.   Endocrine: Negative for polydipsia, polyphagia and polyuria.   Neurological:  Negative for dizziness, syncope, light-headedness and headaches.          Objective  "  Vitals:    09/26/23 0922   BP: 110/86   Pulse: (!) 53   Temp: 98 °F (36.7 °C)   TempSrc: Oral   SpO2: 98%   Weight: 110.5 kg (243 lb 9.7 oz)   Height: 5' 4" (1.626 m)       Physical Exam  Constitutional:       General: She is not in acute distress.     Appearance: Normal appearance. She is well-developed. She is not ill-appearing, toxic-appearing or diaphoretic.   HENT:      Head: Normocephalic and atraumatic.   Eyes:      Conjunctiva/sclera: Conjunctivae normal.   Neck:      Vascular: No carotid bruit.   Cardiovascular:      Rate and Rhythm: Normal rate and regular rhythm.      Heart sounds: Normal heart sounds. No murmur heard.     No friction rub. No gallop.   Pulmonary:      Effort: Pulmonary effort is normal. No respiratory distress.      Breath sounds: Normal breath sounds. No stridor. No wheezing, rhonchi or rales.   Abdominal:      General: Abdomen is flat. Bowel sounds are normal. There is no distension.      Palpations: Abdomen is soft. There is no mass.      Tenderness: There is no abdominal tenderness. There is no guarding or rebound.      Hernia: No hernia is present.   Musculoskeletal:      Cervical back: Normal range of motion and neck supple.      Right lower leg: No edema.      Left lower leg: No edema.   Neurological:      Mental Status: She is alert and oriented to person, place, and time.   Psychiatric:         Mood and Affect: Mood normal.         Behavior: Behavior normal.            Assessment and Plan     1. Severe anemia    2. Hyperlipidemia associated with type 2 diabetes mellitus  -     Lipid Panel; Future; Expected date: 09/26/2023  -     Comprehensive Metabolic Panel; Future; Expected date: 09/26/2023  Stable.   3. Type 2 diabetes mellitus with hyperglycemia, without long-term current use of insulin  -     Lipid Panel; Future; Expected date: 09/26/2023  -     Hemoglobin A1C; Future; Expected date: 09/26/2023  -     Comprehensive Metabolic Panel; Future; Expected date: 09/26/2023  -     " CBC Auto Differential; Future; Expected date: 09/26/2023  -     Microalbumin/Creatinine Ratio, Urine; Future; Expected date: 09/26/2023  Going to Dr. Patino in October for eye exam.     4. Schizophrenia, paranoid  Stable per Dr. Juan Labadie    5. Encounter for screening mammogram for breast cancer  -     Mammo Digital Screening Bilat; Future; Expected date: 11/23/2023  Due for mammogram in November               Follow up in about 6 months (around 3/26/2024).

## 2023-10-05 DIAGNOSIS — E11.65 TYPE 2 DIABETES MELLITUS WITH HYPERGLYCEMIA, WITHOUT LONG-TERM CURRENT USE OF INSULIN: ICD-10-CM

## 2023-10-05 RX ORDER — DULAGLUTIDE 1.5 MG/.5ML
1.5 INJECTION, SOLUTION SUBCUTANEOUS WEEKLY
Qty: 4 PEN | Refills: 4 | Status: SHIPPED | OUTPATIENT
Start: 2023-10-05 | End: 2024-01-22 | Stop reason: SDUPTHER

## 2023-11-24 ENCOUNTER — HOSPITAL ENCOUNTER (OUTPATIENT)
Dept: RADIOLOGY | Facility: HOSPITAL | Age: 53
Discharge: HOME | End: 2023-11-24
Attending: FAMILY MEDICINE
Payer: MEDICARE

## 2023-11-24 DIAGNOSIS — Z12.31 ENCOUNTER FOR SCREENING MAMMOGRAM FOR BREAST CANCER: ICD-10-CM

## 2023-11-24 PROCEDURE — 77067 MAMMO DIGITAL SCREENING BILAT WITH TOMO: ICD-10-PCS | Mod: 26,,, | Performed by: RADIOLOGY

## 2023-11-24 PROCEDURE — 77063 MAMMO DIGITAL SCREENING BILAT WITH TOMO: ICD-10-PCS | Mod: 26,,, | Performed by: RADIOLOGY

## 2023-11-24 PROCEDURE — 77067 SCR MAMMO BI INCL CAD: CPT | Mod: TC

## 2023-11-24 PROCEDURE — 77063 BREAST TOMOSYNTHESIS BI: CPT | Mod: 26,,, | Performed by: RADIOLOGY

## 2023-11-24 PROCEDURE — 77067 SCR MAMMO BI INCL CAD: CPT | Mod: 26,,, | Performed by: RADIOLOGY

## 2023-11-27 ENCOUNTER — TELEPHONE (OUTPATIENT)
Dept: FAMILY MEDICINE | Facility: CLINIC | Age: 53
End: 2023-11-27
Payer: MEDICARE

## 2023-11-27 NOTE — TELEPHONE ENCOUNTER
----- Message from Kayla Varela MD sent at 11/27/2023 11:20 AM CST -----  Please let patient know her mammogram results are normal. Repeat in 1 year.

## 2024-01-01 NOTE — TELEPHONE ENCOUNTER
----- Message from Caryn Sims sent at 9/24/2020  3:32 PM CDT -----  Name of Who is Calling: NICK TRINIDAD      What is the request in detail: Would like orders for a mammogram. Please advise.       Can the clinic reply by [facility]: No      What Number to Call Back if not in [facility]: [phone]                                     9 m/o ex-FT M with recent head injury presenting with concerns for skull fracture with persistent bogginess and palpable stepoff present on physical exam. No focality on neuro exam, is happy alert and playful, feeding well and at baseline for activity level. Will obtain head CT due to chronicity of hematoma without resolution. 9 m/o ex-FT M with recent head injury presenting with concerns for skull fracture with persistent bogginess and palpable stepoff present on physical exam. No focality on neuro exam, is happy alert and playful, feeding well and at baseline for activity level. Will obtain head CT due to chronicity of hematoma without resolution.  --  9m M with head injury appx 2 weeks ago, with persistent bogginess, referred in by pmd. Acting normally otherwise. No vomiting, normal mental status. On exam, patient is well appearing, NAD, HEENT: soft tissue bogginess to forearm, L and R, no obvious tenderness, no conjunctivitis, MMM, Neck supple, Cardiac: regular rate rhythm, Chest: CTA BL, no wheeze or crackles, Abdomen: normal BS, soft, NT, Extremity: no gross deformity, good perfusion Skin: no rash, Neuro: grossly normal   9m M with head injury 2 weeks ago without improved swelling, normal neuro exam. Unlikely to have intracranial pathology requiring acute intervention due to length of symptoms, however will obtain ct given persistent swelling. - Nell Collins MD

## 2024-01-22 DIAGNOSIS — E11.65 TYPE 2 DIABETES MELLITUS WITH HYPERGLYCEMIA, WITHOUT LONG-TERM CURRENT USE OF INSULIN: ICD-10-CM

## 2024-01-22 RX ORDER — DULAGLUTIDE 1.5 MG/.5ML
1.5 INJECTION, SOLUTION SUBCUTANEOUS WEEKLY
Qty: 4 PEN | Refills: 4 | Status: SHIPPED | OUTPATIENT
Start: 2024-01-22 | End: 2024-01-25 | Stop reason: SDUPTHER

## 2024-01-22 NOTE — TELEPHONE ENCOUNTER
----- Message from Betty Elaine sent at 1/22/2024 10:05 AM CST -----  Regarding: self [phone]  Type:  Sooner Appointment Request    Patient is requesting a sooner appointment.  Patient declined first available appointment listed as well as another facility and provider .  Patient will not accept being placed on the waitlist and is requesting a message be sent to doctor.    Name of Caller: self    When is the first available appointment? 4/17/24    Symptoms: medication refill    Would the patient rather a call back or a response via My [facility]? Call back     Best Call Back Number: [phone]      Additional Information: patient stated she need to be seen sooner than 4/17, her dulaglutide (TRULICITY) 1.5 mg/0.5 mL pen injector has ran out and she need the medication.

## 2024-01-24 ENCOUNTER — TELEPHONE (OUTPATIENT)
Dept: ENDOCRINOLOGY | Facility: CLINIC | Age: 54
End: 2024-01-24
Payer: MEDICARE

## 2024-01-24 NOTE — TELEPHONE ENCOUNTER
----- Message from Frandy Page sent at 1/24/2024  1:26 PM CST -----  Name of Who is Calling: NICK TRINIDAD [[id number]]            What is the request in detail: Patient is requesting call back to inform she is out of refills although the next avaviable apt is not until 4/17 and she booked it but needs sooner     dulaglutide (TRULICITY) 1.5 mg/0.5 mL pen injector             Can the clinic reply by [facility]: no              What Number to Call Back if not in [facility]: [phone]

## 2024-01-25 DIAGNOSIS — E11.65 TYPE 2 DIABETES MELLITUS WITH HYPERGLYCEMIA, WITHOUT LONG-TERM CURRENT USE OF INSULIN: ICD-10-CM

## 2024-01-25 RX ORDER — DULAGLUTIDE 1.5 MG/.5ML
1.5 INJECTION, SOLUTION SUBCUTANEOUS WEEKLY
Qty: 4 PEN | Refills: 4 | Status: SHIPPED | OUTPATIENT
Start: 2024-01-25 | End: 2024-04-17

## 2024-01-25 NOTE — TELEPHONE ENCOUNTER
----- Message from Elroy Aki sent at 1/25/2024  8:34 AM CST -----  Regarding: Self [phone]  Type: Patient Call Back    Who called: Self     What is the request in detail: called to say to send the medication dulaglutide (TRULICITY) 1.5 mg/0.5 mL pen injector to the [facility]     Can the clinic reply by [facility]? No     Would the patient rather a call back or a response via [facility]? Call back     Best call back number: [phone]     Additional Information:    Thank you.

## 2024-02-09 ENCOUNTER — LAB VISIT (OUTPATIENT)
Dept: LAB | Facility: HOSPITAL | Age: 54
End: 2024-02-09
Attending: STUDENT IN AN ORGANIZED HEALTH CARE EDUCATION/TRAINING PROGRAM
Payer: MEDICARE

## 2024-02-09 DIAGNOSIS — E11.9 CONTROLLED TYPE 2 DIABETES MELLITUS WITHOUT COMPLICATION, WITHOUT LONG-TERM CURRENT USE OF INSULIN: ICD-10-CM

## 2024-02-09 DIAGNOSIS — E53.8 VITAMIN B12 DEFICIENCY: ICD-10-CM

## 2024-02-09 LAB
ANION GAP SERPL CALC-SCNC: 8 MMOL/L (ref 8–16)
BASOPHILS # BLD AUTO: 0.04 K/UL (ref 0–0.2)
BASOPHILS NFR BLD: 0.6 % (ref 0–1.9)
BUN SERPL-MCNC: 15 MG/DL (ref 6–20)
CALCIUM SERPL-MCNC: 8.8 MG/DL (ref 8.7–10.5)
CHLORIDE SERPL-SCNC: 108 MMOL/L (ref 95–110)
CO2 SERPL-SCNC: 26 MMOL/L (ref 23–29)
CREAT SERPL-MCNC: 1 MG/DL (ref 0.5–1.4)
DIFFERENTIAL METHOD BLD: ABNORMAL
EOSINOPHIL # BLD AUTO: 0.2 K/UL (ref 0–0.5)
EOSINOPHIL NFR BLD: 2.6 % (ref 0–8)
ERYTHROCYTE [DISTWIDTH] IN BLOOD BY AUTOMATED COUNT: 13.9 % (ref 11.5–14.5)
EST. GFR  (NO RACE VARIABLE): >60 ML/MIN/1.73 M^2
ESTIMATED AVG GLUCOSE: 123 MG/DL (ref 68–131)
GLUCOSE SERPL-MCNC: 127 MG/DL (ref 70–110)
HBA1C MFR BLD: 5.9 % (ref 4–5.6)
HCT VFR BLD AUTO: 44.6 % (ref 37–48.5)
HGB BLD-MCNC: 13.8 G/DL (ref 12–16)
IMM GRANULOCYTES # BLD AUTO: 0.02 K/UL (ref 0–0.04)
IMM GRANULOCYTES NFR BLD AUTO: 0.3 % (ref 0–0.5)
LYMPHOCYTES # BLD AUTO: 3.1 K/UL (ref 1–4.8)
LYMPHOCYTES NFR BLD: 43.2 % (ref 18–48)
MCH RBC QN AUTO: 25.8 PG (ref 27–31)
MCHC RBC AUTO-ENTMCNC: 30.9 G/DL (ref 32–36)
MCV RBC AUTO: 84 FL (ref 82–98)
MONOCYTES # BLD AUTO: 0.4 K/UL (ref 0.3–1)
MONOCYTES NFR BLD: 6.1 % (ref 4–15)
NEUTROPHILS # BLD AUTO: 3.4 K/UL (ref 1.8–7.7)
NEUTROPHILS NFR BLD: 47.2 % (ref 38–73)
NRBC BLD-RTO: 0 /100 WBC
PLATELET # BLD AUTO: 240 K/UL (ref 150–450)
PMV BLD AUTO: 9.1 FL (ref 9.2–12.9)
POTASSIUM SERPL-SCNC: 4.2 MMOL/L (ref 3.5–5.1)
RBC # BLD AUTO: 5.34 M/UL (ref 4–5.4)
SODIUM SERPL-SCNC: 142 MMOL/L (ref 136–145)
WBC # BLD AUTO: 7.22 K/UL (ref 3.9–12.7)

## 2024-02-09 PROCEDURE — 82607 VITAMIN B-12: CPT | Performed by: STUDENT IN AN ORGANIZED HEALTH CARE EDUCATION/TRAINING PROGRAM

## 2024-02-09 PROCEDURE — 83036 HEMOGLOBIN GLYCOSYLATED A1C: CPT | Performed by: HOSPITALIST

## 2024-02-09 PROCEDURE — 85025 COMPLETE CBC W/AUTO DIFF WBC: CPT | Performed by: STUDENT IN AN ORGANIZED HEALTH CARE EDUCATION/TRAINING PROGRAM

## 2024-02-09 PROCEDURE — 80048 BASIC METABOLIC PNL TOTAL CA: CPT | Performed by: HOSPITALIST

## 2024-02-09 PROCEDURE — 36415 COLL VENOUS BLD VENIPUNCTURE: CPT | Performed by: STUDENT IN AN ORGANIZED HEALTH CARE EDUCATION/TRAINING PROGRAM

## 2024-02-09 PROCEDURE — 83921 ORGANIC ACID SINGLE QUANT: CPT | Performed by: STUDENT IN AN ORGANIZED HEALTH CARE EDUCATION/TRAINING PROGRAM

## 2024-02-10 LAB — VIT B12 SERPL-MCNC: 1178 PG/ML (ref 210–950)

## 2024-02-15 LAB — METHYLMALONATE SERPL-SCNC: 0.12 UMOL/L

## 2024-02-16 ENCOUNTER — OFFICE VISIT (OUTPATIENT)
Dept: HEMATOLOGY/ONCOLOGY | Facility: CLINIC | Age: 54
End: 2024-02-16
Payer: MEDICARE

## 2024-02-16 ENCOUNTER — TELEPHONE (OUTPATIENT)
Dept: ENDOCRINOLOGY | Facility: CLINIC | Age: 54
End: 2024-02-16
Payer: MEDICARE

## 2024-02-16 VITALS
OXYGEN SATURATION: 99 % | DIASTOLIC BLOOD PRESSURE: 79 MMHG | HEIGHT: 64 IN | HEART RATE: 69 BPM | SYSTOLIC BLOOD PRESSURE: 120 MMHG | BODY MASS INDEX: 43.47 KG/M2 | WEIGHT: 254.63 LBS

## 2024-02-16 DIAGNOSIS — E53.8 VITAMIN B12 DEFICIENCY: Primary | ICD-10-CM

## 2024-02-16 DIAGNOSIS — E66.01 CLASS 3 SEVERE OBESITY DUE TO EXCESS CALORIES WITH SERIOUS COMORBIDITY AND BODY MASS INDEX (BMI) OF 40.0 TO 44.9 IN ADULT: ICD-10-CM

## 2024-02-16 DIAGNOSIS — E11.65 TYPE 2 DIABETES MELLITUS WITH HYPERGLYCEMIA, WITHOUT LONG-TERM CURRENT USE OF INSULIN: Primary | ICD-10-CM

## 2024-02-16 DIAGNOSIS — F20.0 PARANOID SCHIZOPHRENIA: ICD-10-CM

## 2024-02-16 DIAGNOSIS — Z76.0 MEDICATION REFILL: ICD-10-CM

## 2024-02-16 PROBLEM — D69.6 THROMBOCYTOPENIA: Status: RESOLVED | Noted: 2021-12-19 | Resolved: 2024-02-16

## 2024-02-16 PROCEDURE — 99214 OFFICE O/P EST MOD 30 MIN: CPT | Mod: S$PBB,,, | Performed by: STUDENT IN AN ORGANIZED HEALTH CARE EDUCATION/TRAINING PROGRAM

## 2024-02-16 PROCEDURE — 99999 PR PBB SHADOW E&M-EST. PATIENT-LVL IV: CPT | Mod: PBBFAC,,, | Performed by: STUDENT IN AN ORGANIZED HEALTH CARE EDUCATION/TRAINING PROGRAM

## 2024-02-16 PROCEDURE — 99214 OFFICE O/P EST MOD 30 MIN: CPT | Mod: PBBFAC | Performed by: STUDENT IN AN ORGANIZED HEALTH CARE EDUCATION/TRAINING PROGRAM

## 2024-02-16 RX ORDER — DULAGLUTIDE 3 MG/.5ML
3 INJECTION, SOLUTION SUBCUTANEOUS
Qty: 12 PEN | Refills: 3 | Status: SHIPPED | OUTPATIENT
Start: 2024-02-16 | End: 2024-04-17 | Stop reason: SDUPTHER

## 2024-02-16 RX ORDER — CYANOCOBALAMIN 1000 UG/ML
1000 INJECTION, SOLUTION INTRAMUSCULAR; SUBCUTANEOUS
Qty: 6 ML | Refills: 1 | Status: SHIPPED | OUTPATIENT
Start: 2024-02-16 | End: 2025-02-15

## 2024-02-16 NOTE — PROGRESS NOTES
PATIENT: Ava Driscoll  MRN: [id number]  DATE: 2024      Diagnosis:   1. Vitamin B12 deficiency            Chief Complaint: Follow-up, b12 def, and Results      Oncologic History:   Oncologic History    Oncologic History      Oncologic Treatment      Pathology          Subjective:    Interval History: Ms. Driscoll returns for follow up.     53 year old woman with schizophrenia, hypertension, diabetes, hyperlipidemia, and obesity is here for follow up for b12 deficiency.      Pt doing well and currently on monthly b12 injections.  She feels well and denied any complaints  CBC stable and reviewed with pt.  B12 shots refilled.    She is alone at this visit.    Past Medical History:   Past Medical History:   Diagnosis Date    Behavioral problem     Colon polyps     Diabetes mellitus     History of psychiatric care     History of psychiatric hospitalization     Hx of psychiatric care     Loud snoring     Obese     Psychiatric problem     Retinopathy due to secondary diabetes     Schizophrenia     Severe anemia 2021    Therapy        Past Surgical HIstory:   Past Surgical History:   Procedure Laterality Date     SECTION      COLONOSCOPY N/A 2020    Procedure: COLONOSCOPY;  Surgeon: Edvin Zuniga II, MD;  Location: [facility];  Service: Endoscopy;  Laterality: N/A;    HYSTERECTOMY         Family History:   Family History   Problem Relation Age of Onset    Arthritis Mother     Diabetes type II Father     Breast cancer Maternal Aunt 80        unilat    Ovarian cancer Neg Hx        Social History:  reports that she has quit smoking. Her smoking use included cigarettes. She has never used smokeless tobacco. She reports that she does not drink alcohol and does not use drugs.    Allergies:  Review of patient's allergies indicates:   Allergen Reactions    Ace inhibitors Other (See Comments)     Cough       Medications:  Current Outpatient Medications   Medication Sig Dispense Refill    blood sugar  diagnostic Strp One test strip use 2 times a day to check blood glucose,  ICD-10: E11.9, compatible with insurance/glucometer 100 each 4    blood-glucose meter kit One glucometer, use to check 2 times a day.   ICD-10: E11.9, Dispense machine covered by insurance 1 each 0    dulaglutide (TRULICITY) 1.5 mg/0.5 mL pen injector Inject 1.5 mg into the skin once a week. 4 pen 4    empagliflozin (JARDIANCE) 25 mg tablet Take 1 tablet (25 mg total) by mouth once daily. 90 tablet 2    ergocalciferol (ERGOCALCIFEROL) 50,000 unit Cap Take 1 capsule (50,000 Units total) by mouth every 7 days. 13 capsule 3    fluticasone propionate (FLONASE) 50 mcg/actuation nasal spray 2 sprays (100 mcg total) by Each Nostril route 2 (two) times a day. 16 g 2    INVEGA SUSTENNA 156 mg/mL Syrg injection       lancets Misc One lancets use 2 times a day to check blood glucose, ICD-10: E11.9 (2)/E10.9 (1) 100 each 4    lancing device Misc One device, used to check blood glucose, ICD-10: E11.9 1 each 0    pravastatin (PRAVACHOL) 40 MG tablet Take 1 tablet (40 mg total) by mouth once daily. 90 tablet 1    triamcinolone acetonide 0.1% (KENALOG) 0.1 % cream Apply topically 2 (two) times daily. 453 g 1    cyanocobalamin 1,000 mcg/mL injection Inject 1 mL (1,000 mcg total) into the muscle every 30 days. 6 mL 1    dulaglutide (TRULICITY) 3 mg/0.5 mL pen injector Inject 3 mg into the skin every 7 days. 12 pen 3    levocetirizine (XYZAL) 5 MG tablet Take 1 tablet (5 mg total) by mouth every evening. (Patient not taking: Reported on 9/26/2023) 90 tablet 3    metFORMIN (GLUCOPHAGE) 1000 MG tablet Take 1 tablet (1,000 mg total) by mouth daily with breakfast. 90 tablet 3     No current facility-administered medications for this visit.       Review of Systems   Constitutional:  Negative for activity change, appetite change, chills, diaphoresis, fatigue, fever and unexpected weight change.   HENT:  Negative for nosebleeds and trouble swallowing.    Eyes:   "Negative for visual disturbance.   Respiratory:  Negative for cough, chest tightness, shortness of breath and wheezing.    Cardiovascular:  Negative for chest pain and leg swelling.   Gastrointestinal:  Negative for abdominal distention, abdominal pain, blood in stool, constipation, diarrhea, nausea and vomiting.   Endocrine: Negative for cold intolerance and heat intolerance.   Genitourinary:  Negative for difficulty urinating and dysuria.   Musculoskeletal:  Negative for arthralgias and back pain.   Skin:  Negative for color change.   Neurological:  Negative for dizziness, weakness, light-headedness, numbness and headaches.   Hematological:  Negative for adenopathy. Does not bruise/bleed easily.   Psychiatric/Behavioral:  Negative for confusion.      ECOG Performance Status:     ECOG SCORE             Objective:      Vitals:   Vitals:    02/16/24 0855   BP: 120/79   Pulse: 69   SpO2: 99%   Weight: 115.5 kg (254 lb 10.1 oz)   Height: 5' 4" (1.626 m)         BMI: Body mass index is 43.71 kg/m².    Physical Exam  Constitutional:       General: She is not in acute distress.     Appearance: She is obese. She is not ill-appearing.   HENT:      Head: Normocephalic and atraumatic.      Mouth/Throat:      Pharynx: No oropharyngeal exudate or posterior oropharyngeal erythema.   Eyes:      General: No scleral icterus.     Extraocular Movements: Extraocular movements intact.      Conjunctiva/sclera: Conjunctivae normal.      Pupils: Pupils are equal, round, and reactive to light.   Cardiovascular:      Rate and Rhythm: Normal rate and regular rhythm.      Heart sounds: No murmur heard.    No friction rub. No gallop.   Pulmonary:      Effort: Pulmonary effort is normal. No respiratory distress.      Breath sounds: No stridor. No wheezing, rhonchi or rales.   Abdominal:      General: Bowel sounds are normal. There is no distension.      Palpations: Abdomen is soft. There is no mass.      Tenderness: There is no abdominal " tenderness. There is no guarding or rebound.   Musculoskeletal:         General: Normal range of motion.      Cervical back: Normal range of motion and neck supple.      Right lower leg: No edema.      Left lower leg: No edema.   Skin:     General: Skin is warm and dry.   Neurological:      General: No focal deficit present.      Mental Status: She is alert.       Laboratory Data:   Recent Results (from the past 168 hour(s))   Basic Metabolic Panel    Collection Time: 02/09/24 12:01 PM   Result Value Ref Range    Sodium 142 136 - 145 mmol/L    Potassium 4.2 3.5 - 5.1 mmol/L    Chloride 108 95 - 110 mmol/L    CO2 26 23 - 29 mmol/L    Glucose 127 (H) 70 - 110 mg/dL    BUN 15 6 - 20 mg/dL    Creatinine 1.0 0.5 - 1.4 mg/dL    Calcium 8.8 8.7 - 10.5 mg/dL    Anion Gap 8 8 - 16 mmol/L    eGFR >60 >60 mL/min/1.73 m^2   Hemoglobin A1C    Collection Time: 02/09/24 12:01 PM   Result Value Ref Range    Hemoglobin A1C 5.9 (H) 4.0 - 5.6 %    Estimated Avg Glucose 123 68 - 131 mg/dL   CBC W/ AUTO DIFFERENTIAL    Collection Time: 02/09/24 12:01 PM   Result Value Ref Range    WBC 7.22 3.90 - 12.70 K/uL    RBC 5.34 4.00 - 5.40 M/uL    Hemoglobin 13.8 12.0 - 16.0 g/dL    Hematocrit 44.6 37.0 - 48.5 %    MCV 84 82 - 98 fL    MCH 25.8 (L) 27.0 - 31.0 pg    MCHC 30.9 (L) 32.0 - 36.0 g/dL    RDW 13.9 11.5 - 14.5 %    Platelets 240 150 - 450 K/uL    MPV 9.1 (L) 9.2 - 12.9 fL    Immature Granulocytes 0.3 0.0 - 0.5 %    Gran # (ANC) 3.4 1.8 - 7.7 K/uL    Immature Grans (Abs) 0.02 0.00 - 0.04 K/uL    Lymph # 3.1 1.0 - 4.8 K/uL    Mono # 0.4 0.3 - 1.0 K/uL    Eos # 0.2 0.0 - 0.5 K/uL    Baso # 0.04 0.00 - 0.20 K/uL    nRBC 0 0 /100 WBC    Gran % 47.2 38.0 - 73.0 %    Lymph % 43.2 18.0 - 48.0 %    Mono % 6.1 4.0 - 15.0 %    Eosinophil % 2.6 0.0 - 8.0 %    Basophil % 0.6 0.0 - 1.9 %    Differential Method Automated    METHYLMALONIC ACID, SERUM    Collection Time: 02/09/24 12:01 PM   Result Value Ref Range    Methlymalonic Acid 0.12 <0.40 umol/L    VITAMIN B12    Collection Time: 02/09/24 12:01 PM   Result Value Ref Range    Vitamin B-12 1178 (H) 210 - 950 pg/mL           Imaging:       Assessment/Plan           Vitamin B12 deficiency - Primary  -Cytopenias completed resolved. Intrinsic factor antibody positive.  -Counseled patient that she needs to be on vitamin B12 supplementation for life  -Discussed with her oral vs IM supplementation. She prefers to continue IM.  She can continue monthly injections.  -B12 shots refilled today, CBC stable  -labs and follow up in 1 year.      Schizophrenia  -Stable  -Per [facility]      HTN  -Stable  -Per PCP      DM  -Stable  -Per PCP    Orders Placed This Encounter   Procedures    CBC W/ AUTO DIFFERENTIAL    METHYLMALONIC ACID, SERUM    VITAMIN B12               Remington Valencia MD  Hematology Oncology

## 2024-02-27 DIAGNOSIS — E78.5 HYPERLIPIDEMIA ASSOCIATED WITH TYPE 2 DIABETES MELLITUS: ICD-10-CM

## 2024-02-27 DIAGNOSIS — E11.69 HYPERLIPIDEMIA ASSOCIATED WITH TYPE 2 DIABETES MELLITUS: ICD-10-CM

## 2024-02-27 RX ORDER — PRAVASTATIN SODIUM 40 MG/1
40 TABLET ORAL DAILY
Qty: 90 TABLET | Refills: 0 | Status: SHIPPED | OUTPATIENT
Start: 2024-02-27 | End: 2024-06-06 | Stop reason: SDUPTHER

## 2024-02-27 NOTE — TELEPHONE ENCOUNTER
----- Message from Lamar Borden sent at 2/27/2024  9:27 AM CST -----  Type: RX Refill Request    Who Called: pt     Have you contacted your pharmacy:    Refill or New Rx:pravastatin (PRAVACHOL) 40 MG tablet      RX Name and Strength:    How is the patient currently taking it? (ex. 1XDay):    Is this a 30 day or 90 day RX:    Preferred Pharmacy with phone number:  [facility]/pharmacy #[id number] - [place] - [address]  [address] [place] [address]  Phone: [phone] Fax: [phone]          Local or Mail Order:    Ordering Provider:    Would the patient rather a call back or a response via My [facility]? call    Best Call Back Number:[phone] (home)       Additional Information:

## 2024-02-27 NOTE — TELEPHONE ENCOUNTER
No care due was identified.  [facility] Embedded Care Due Messages. Reference number: [id number].   2/27/2024 9:54:46 AM CST

## 2024-02-27 NOTE — TELEPHONE ENCOUNTER
Last Office Visit Info:   The patient's last visit with Kayla Varela MD was on 9/26/2023.    The patient's last visit in current department was on 9/26/2023.

## 2024-03-13 DIAGNOSIS — E11.9 CONTROLLED TYPE 2 DIABETES MELLITUS WITHOUT COMPLICATION, WITHOUT LONG-TERM CURRENT USE OF INSULIN: ICD-10-CM

## 2024-03-13 RX ORDER — EMPAGLIFLOZIN 25 MG/1
25 TABLET, FILM COATED ORAL
Qty: 90 TABLET | Refills: 2 | Status: SHIPPED | OUTPATIENT
Start: 2024-03-13 | End: 2024-04-17 | Stop reason: SDUPTHER

## 2024-03-19 DIAGNOSIS — E55.9 VITAMIN D DEFICIENCY: ICD-10-CM

## 2024-03-19 DIAGNOSIS — E11.9 CONTROLLED TYPE 2 DIABETES MELLITUS WITHOUT COMPLICATION, WITHOUT LONG-TERM CURRENT USE OF INSULIN: ICD-10-CM

## 2024-03-20 RX ORDER — METFORMIN HYDROCHLORIDE 1000 MG/1
1000 TABLET ORAL
Qty: 90 TABLET | Refills: 3 | Status: SHIPPED | OUTPATIENT
Start: 2024-03-20 | End: 2024-04-17 | Stop reason: SDUPTHER

## 2024-03-20 RX ORDER — ERGOCALCIFEROL 1.25 MG/1
50000 CAPSULE ORAL
Qty: 13 CAPSULE | Refills: 3 | Status: SHIPPED | OUTPATIENT
Start: 2024-03-20 | End: 2024-04-17 | Stop reason: SDUPTHER

## 2024-03-28 ENCOUNTER — TELEPHONE (OUTPATIENT)
Dept: ENDOCRINOLOGY | Facility: CLINIC | Age: 54
End: 2024-03-28
Payer: MEDICARE

## 2024-03-28 DIAGNOSIS — E11.65 TYPE 2 DIABETES MELLITUS WITH HYPERGLYCEMIA, WITHOUT LONG-TERM CURRENT USE OF INSULIN: Primary | ICD-10-CM

## 2024-03-28 NOTE — TELEPHONE ENCOUNTER
----- Message from Priscilla Hardin sent at 3/28/2024 10:40 AM CDT -----  Regarding: Request for Lab Appointment  Type: Lab    Caller is requesting to schedule their Lab appointment prior to annual appointment.    Order is not listed in EPIC.  Please enter order and contact patient to schedule.    Name of Caller: Self     Preferred Date and Time of Labs: April 10th     Date of EPP Appointment: April 17th     Where would they like the lab performed? [facility]    Would the patient rather a call back or a response via My [facility]? Call     Best Call Back Number: [phone]

## 2024-04-10 ENCOUNTER — LAB VISIT (OUTPATIENT)
Dept: LAB | Facility: HOSPITAL | Age: 54
End: 2024-04-10
Attending: HOSPITALIST
Payer: MEDICARE

## 2024-04-10 DIAGNOSIS — E11.65 TYPE 2 DIABETES MELLITUS WITH HYPERGLYCEMIA, WITHOUT LONG-TERM CURRENT USE OF INSULIN: ICD-10-CM

## 2024-04-10 LAB
ANION GAP SERPL CALC-SCNC: 7 MMOL/L (ref 8–16)
BUN SERPL-MCNC: 16 MG/DL (ref 6–20)
CALCIUM SERPL-MCNC: 9.3 MG/DL (ref 8.7–10.5)
CHLORIDE SERPL-SCNC: 109 MMOL/L (ref 95–110)
CO2 SERPL-SCNC: 27 MMOL/L (ref 23–29)
CREAT SERPL-MCNC: 1 MG/DL (ref 0.5–1.4)
EST. GFR  (NO RACE VARIABLE): >60 ML/MIN/1.73 M^2
ESTIMATED AVG GLUCOSE: 126 MG/DL (ref 68–131)
GLUCOSE SERPL-MCNC: 113 MG/DL (ref 70–110)
HBA1C MFR BLD: 6 % (ref 4–5.6)
POTASSIUM SERPL-SCNC: 5.2 MMOL/L (ref 3.5–5.1)
SODIUM SERPL-SCNC: 143 MMOL/L (ref 136–145)

## 2024-04-10 PROCEDURE — 80048 BASIC METABOLIC PNL TOTAL CA: CPT | Performed by: HOSPITALIST

## 2024-04-10 PROCEDURE — 83036 HEMOGLOBIN GLYCOSYLATED A1C: CPT | Performed by: HOSPITALIST

## 2024-04-10 PROCEDURE — 36415 COLL VENOUS BLD VENIPUNCTURE: CPT | Performed by: HOSPITALIST

## 2024-04-17 ENCOUNTER — OFFICE VISIT (OUTPATIENT)
Dept: ENDOCRINOLOGY | Facility: CLINIC | Age: 54
End: 2024-04-17
Payer: MEDICARE

## 2024-04-17 VITALS
DIASTOLIC BLOOD PRESSURE: 86 MMHG | BODY MASS INDEX: 43.87 KG/M2 | WEIGHT: 255.63 LBS | SYSTOLIC BLOOD PRESSURE: 120 MMHG | HEART RATE: 84 BPM

## 2024-04-17 DIAGNOSIS — E11.65 TYPE 2 DIABETES MELLITUS WITH HYPERGLYCEMIA, WITHOUT LONG-TERM CURRENT USE OF INSULIN: Primary | ICD-10-CM

## 2024-04-17 DIAGNOSIS — N25.81 SECONDARY HYPERPARATHYROIDISM: ICD-10-CM

## 2024-04-17 DIAGNOSIS — I10 ESSENTIAL HYPERTENSION: ICD-10-CM

## 2024-04-17 DIAGNOSIS — E66.01 CLASS 3 SEVERE OBESITY DUE TO EXCESS CALORIES WITH SERIOUS COMORBIDITY AND BODY MASS INDEX (BMI) OF 40.0 TO 44.9 IN ADULT: ICD-10-CM

## 2024-04-17 DIAGNOSIS — F20.0 PARANOID SCHIZOPHRENIA: ICD-10-CM

## 2024-04-17 DIAGNOSIS — E11.9 CONTROLLED TYPE 2 DIABETES MELLITUS WITHOUT COMPLICATION, WITHOUT LONG-TERM CURRENT USE OF INSULIN: ICD-10-CM

## 2024-04-17 DIAGNOSIS — E55.9 VITAMIN D DEFICIENCY: ICD-10-CM

## 2024-04-17 PROCEDURE — 99214 OFFICE O/P EST MOD 30 MIN: CPT | Mod: S$PBB,,, | Performed by: HOSPITALIST

## 2024-04-17 PROCEDURE — 99213 OFFICE O/P EST LOW 20 MIN: CPT | Mod: PBBFAC | Performed by: HOSPITALIST

## 2024-04-17 PROCEDURE — 99999 PR PBB SHADOW E&M-EST. PATIENT-LVL III: CPT | Mod: PBBFAC,,, | Performed by: HOSPITALIST

## 2024-04-17 RX ORDER — ERGOCALCIFEROL 1.25 MG/1
50000 CAPSULE ORAL
Qty: 13 CAPSULE | Refills: 3 | Status: SHIPPED | OUTPATIENT
Start: 2024-04-17

## 2024-04-17 RX ORDER — LANCETS
EACH MISCELLANEOUS
Qty: 100 EACH | Refills: 4 | Status: SHIPPED | OUTPATIENT
Start: 2024-04-17 | End: 2024-04-18

## 2024-04-17 RX ORDER — METFORMIN HYDROCHLORIDE 1000 MG/1
1000 TABLET ORAL
Qty: 90 TABLET | Refills: 3 | Status: SHIPPED | OUTPATIENT
Start: 2024-04-17

## 2024-04-17 RX ORDER — DULAGLUTIDE 3 MG/.5ML
3 INJECTION, SOLUTION SUBCUTANEOUS
Qty: 12 PEN | Refills: 3 | Status: SHIPPED | OUTPATIENT
Start: 2024-04-17 | End: 2025-04-17

## 2024-04-17 NOTE — PROGRESS NOTES
Subjective:      Patient ID: Ava Driscoll is a 53 y.o. female presented to [facility]  Chief Complaint:  Diabetes      History of Present Illness: Ava Driscoll is a 53 y.o. female here for type 2 diabetes  Other significant past medical history:  Schizophrenia (on Invega sustenna injection), obesity, HLD, recent COVID infection in 2020  Recent admission for:  anemia (s/p transfusion  RBC x 3 units on 12/17/2021), vitamin B 12 deficiency      Interval history:  Patient here for follow-up type 2 diabetes doing well.  A1c 6.0%  Tolerating Trulicity increase to 3.0 mg due to shortages.  No issues with Jardiance, denies UTI  Glucose log review show stable glucose.  No change in Invega  Patient reports living at home by herself, eat predominant prepackage food.   Continue vitamin-D supplements, improvement in PTH  Current weight in clinic: 255 lb, previous weight: 245, 242    1) Diabetes Mellitus Type 2  - Known diabetic complications: none  - Diagnosed w/ DM: in 2013    Glucose log review show stable glucose:  No hypoglycemia  142  116  109  124  117  161  112  117  114    Current meds:   Metformin 1000 mg daily (unable to tolerate higher doses)  Jardiance 25 mg daily  Trulicity 3.0 mg Q weekly injection, sunday     Misses medication doses - yes  Injection Technique: Good  Rotation of injection site: Yes   Previous meds:    Glimepiride    Home glucose checks:  Difficult to check glucose, glucose logs available for review today   Hypoglycemia: denies  Diet/Exercise:               Eating 2 meals per day, skip breakfast, eat lunch and dinner, Lean Cuisine  Weight trend: stable  Diabetes Education: No  Diabetes Related Hospitalization:  No  Hx of pancreatitis, hx of thyroid cancer: No  Working: disabled     Statin: Taking, ACE/ARB: Not taking    Diabetes lab work  Lab Results   Component Value Date    HGBA1C 6.0 (H) 04/10/2024    HGBA1C 5.9 (H) 02/09/2024    HGBA1C 5.7 (H) 09/26/2023    HGBA1C 6.1 (H)  "01/23/2023     No results found for: "CPEPTIDE", "GLUTAMICACID", "ISLETCELLANT"   Lab Results   Component Value Date    FRUCTOSAMINE 199 04/22/2022     Lab Results   Component Value Date    MICALBCREAT 9.5 09/26/2023     Lab Results   Component Value Date    HRGEBPUN83 1178 (H) 02/09/2024     Diabetes Management Status: Reviewed this office visit  Screening or Prevention Patient's value Goal Complete/Controlled?   Lipid profile : 09/26/2023 Annually Yes     Dilated retinal exam : 10/18/2021 Annually Yes     Foot exam   : 12/13/2022 Annually Yes         2) Hyperparathyroidism  - no hypercalcemia  - history of hypocalcemia  - not on hydrochlorothiazide   - vitamin-D deficiency noted >> on Ergocal  - repeat level: Results WNL, PTH 74.   - RESOLVED     Lab Results   Component Value Date    PTH 74.1 01/23/2023    .9 (H) 04/22/2022    .6 (H) 03/20/2019    PPZHPKIS00ME 29 (L) 09/26/2023    NPICJNEA46PH 28 (L) 08/30/2022    NCBHPXDZ28TE 7 (L) 11/11/2021    CALCIUM 9.3 04/10/2024    CALCIUM 8.8 02/09/2024    CALCIUM 9.0 09/26/2023    PHOS 3.0 11/15/2011    PHOS 3.4 11/14/2011    ALKPHOS 71 09/26/2023    ALKPHOS 131 04/22/2022    ALKPHOS 53 (L) 12/18/2021     Reviewed past surgical, medical, family, social history and updated as appropriate.  Review of Systems: see HPI above    Objective:   /86   Pulse 84   Wt 115.9 kg (255 lb 9.6 oz)   BMI 43.87 kg/m²     Body mass index is 43.87 kg/m².  Vital signs reviewed    Physical Exam  Vitals and nursing note reviewed.   Constitutional:       Appearance: Normal appearance. She is well-developed. She is obese. She is not ill-appearing.   Neck:      Thyroid: No thyromegaly.   Pulmonary:      Effort: Pulmonary effort is normal. No respiratory distress.   Musculoskeletal:         General: Normal range of motion.      Cervical back: Normal range of motion.   Neurological:      General: No focal deficit present.      Mental Status: She is alert. Mental status is at " baseline.   Psychiatric:         Mood and Affect: Mood normal.         Behavior: Behavior normal.       Lab Reviewed:   Lab Results   Component Value Date    CHOL 144 09/26/2023    HDL 41 09/26/2023    LDLCALC 91.6 09/26/2023    TRIG 57 09/26/2023    CHOLHDL 28.5 09/26/2023     Lab Results   Component Value Date     04/10/2024    K 5.2 (H) 04/10/2024     04/10/2024    CO2 27 04/10/2024     (H) 04/10/2024    BUN 16 04/10/2024    CREATININE 1.0 04/10/2024    CALCIUM 9.3 04/10/2024    PROT 7.5 09/26/2023    ALBUMIN 3.6 09/26/2023    BILITOT 0.5 09/26/2023    ALKPHOS 71 09/26/2023    AST 15 09/26/2023    ALT 12 09/26/2023    ANIONGAP 7 (L) 04/10/2024    ESTGFRAFRICA >60 04/22/2022    EGFRNONAA 58 (A) 04/22/2022    TSH 2.927 12/18/2021     Assessment     1. Type 2 diabetes mellitus with hyperglycemia, without long-term current use of insulin  metFORMIN (GLUCOPHAGE) 1000 MG tablet    dulaglutide (TRULICITY) 3 mg/0.5 mL pen injector    empagliflozin (JARDIANCE) 25 mg tablet    lancets Misc    blood sugar diagnostic Strp    Microalbumin/Creatinine Ratio, Urine    HEMOGLOBIN A1C    Basic Metabolic Panel      2. Controlled type 2 diabetes mellitus without complication, without long-term current use of insulin        3. Vitamin D deficiency  ergocalciferol (ERGOCALCIFEROL) 50,000 unit Cap    Vitamin D      4. Paranoid schizophrenia        5. Essential hypertension        6. Class 3 severe obesity due to excess calories with serious comorbidity and body mass index (BMI) of 40.0 to 44.9 in adult        7. Secondary hyperparathyroidism            Plan     Type 2 diabetes mellitus with hyperglycemia, without long-term current use of insulin  - Diabetes is controlled given glucose log, suspect A1c will be at goal A1C  - A1c goal for patient is <7%  - Complicated by schizophrenia and the injectable Invega sustanna which has been known to lead to hyperglycemia  - has been doing much better job at glycemic control.   Glucose log review show very stable glucose no hypoglycemia  - Continue reinforcement of dietary modification, portion size control, decreasing carbohydrates intake  - encourage patient for continue weight loss    Plan  - Continue metformin 1000 mg daily (unable to tolerate higher doses), continue Jardiance 25 mg daily  - continue Trulicity to 3.0 mg once a week injection  - advised pt to check glucoses 1-2x a day, glucose logs given in clinic, glucometer and testing strips sent to pharmacy  - Clear written instruction given on AVS and Follow up as schedule  - follow-up with 5 months, check A1c    Paranoid schizophrenia  - patient on Invega sustained a which has been shown to cause hyperglycemia  - continue follow-up with psychiatry  - difficult to manage diabetes in setting of paranoid/schizophrenic    Essential hypertension  - Chronic, blood pressure reviewed  - Continue management by PCP    Class 3 severe obesity due to excess calories with serious comorbidity and body mass index (BMI) of 40.0 to 44.9 in adult  - Body mass index is 43.87 kg/m².  - dietary discussion as above  - continue to monitor weight  - continue Trulicity, encourage continue weight loss    Vitamin D deficiency  - Lab work reviewed, and discussed with patient in clinic  - Currently on  Vit D2 [id number] qweekly  - lab work every 6 mo or yearly    Secondary hyperparathyroidism  - Resolved    RTC in 5-6 months    Yvon Goldstein M.D  Endocrinology  [facility]  4/17/2024      Disclaimer: This note has been generated using voice-recognition software. There may be typographical errors that have been missed during proof-reading.

## 2024-04-17 NOTE — ASSESSMENT & PLAN NOTE
- Body mass index is 43.87 kg/m².  - dietary discussion as above  - continue to monitor weight  - continue Trulicity, encourage continue weight loss

## 2024-04-17 NOTE — ASSESSMENT & PLAN NOTE
- Diabetes is controlled given glucose log, suspect A1c will be at goal A1C  - A1c goal for patient is <7%  - Complicated by schizophrenia and the injectable Invega sustanna which has been known to lead to hyperglycemia  - has been doing much better job at glycemic control.  Glucose log review show very stable glucose no hypoglycemia  - Continue reinforcement of dietary modification, portion size control, decreasing carbohydrates intake  - encourage patient for continue weight loss    Plan  - Continue metformin 1000 mg daily (unable to tolerate higher doses), continue Jardiance 25 mg daily  - continue Trulicity to 3.0 mg once a week injection  - advised pt to check glucoses 1-2x a day, glucose logs given in clinic, glucometer and testing strips sent to pharmacy  - Clear written instruction given on AVS and Follow up as schedule  - follow-up with 5 months, check A1c

## 2024-04-17 NOTE — ASSESSMENT & PLAN NOTE
- Lab work reviewed, and discussed with patient in clinic  - Currently on  Vit D2 82076oz qweekly  - lab work every 6 mo or yearly

## 2024-04-18 RX ORDER — LANCING DEVICE
EACH MISCELLANEOUS
Qty: 1 EACH | Refills: 0 | Status: SHIPPED | OUTPATIENT
Start: 2024-04-18

## 2024-04-18 RX ORDER — LANCETS 33 GAUGE
EACH MISCELLANEOUS
Qty: 100 EACH | Refills: 4 | OUTPATIENT
Start: 2024-04-18

## 2024-04-18 RX ORDER — LANCETS
EACH MISCELLANEOUS
Qty: 200 EACH | Refills: 5 | Status: SHIPPED | OUTPATIENT
Start: 2024-04-18

## 2024-04-18 RX ORDER — BLOOD SUGAR DIAGNOSTIC
STRIP MISCELLANEOUS
Qty: 100 STRIP | Refills: 4 | OUTPATIENT
Start: 2024-04-18

## 2024-04-18 RX ORDER — INSULIN PUMP SYRINGE, 3 ML
EACH MISCELLANEOUS
Qty: 1 EACH | Refills: 0 | Status: SHIPPED | OUTPATIENT
Start: 2024-04-18

## 2024-05-03 ENCOUNTER — TELEPHONE (OUTPATIENT)
Dept: FAMILY MEDICINE | Facility: CLINIC | Age: 54
End: 2024-05-03
Payer: MEDICARE

## 2024-05-03 NOTE — TELEPHONE ENCOUNTER
----- Message from Nona Galindo sent at 5/3/2024 11:39 AM CDT -----  ..Type:  Sooner Appointment Request    Patient is requesting a sooner appointment.  Patient declined first available appointment listed as well as another facility and provider .  Patient will not accept being placed on the waitlist and is requesting a message be sent to doctor.    Name of Caller: Self     When is the first available appointment? 5/10/24    Symptoms: Blood Pressure     Would the patient rather a call back or a response via My [facility]? Call Back     Best Call Back Number: .[phone] (home)       Additional Information:

## 2024-05-03 NOTE — TELEPHONE ENCOUNTER
Attempted to return call to patient to assist in scheduling sooner appointment, no answer. No voicemail available to leave message.

## 2024-05-15 ENCOUNTER — OFFICE VISIT (OUTPATIENT)
Dept: FAMILY MEDICINE | Facility: CLINIC | Age: 54
End: 2024-05-15
Payer: MEDICARE

## 2024-05-15 ENCOUNTER — LAB VISIT (OUTPATIENT)
Dept: LAB | Facility: HOSPITAL | Age: 54
End: 2024-05-15
Attending: FAMILY MEDICINE
Payer: MEDICARE

## 2024-05-15 VITALS
HEIGHT: 64 IN | SYSTOLIC BLOOD PRESSURE: 120 MMHG | WEIGHT: 255.5 LBS | HEART RATE: 84 BPM | DIASTOLIC BLOOD PRESSURE: 88 MMHG | OXYGEN SATURATION: 98 % | BODY MASS INDEX: 43.62 KG/M2 | TEMPERATURE: 98 F

## 2024-05-15 DIAGNOSIS — I10 ESSENTIAL HYPERTENSION: ICD-10-CM

## 2024-05-15 DIAGNOSIS — E11.65 TYPE 2 DIABETES MELLITUS WITH HYPERGLYCEMIA, WITHOUT LONG-TERM CURRENT USE OF INSULIN: Primary | ICD-10-CM

## 2024-05-15 DIAGNOSIS — E78.5 HYPERLIPIDEMIA ASSOCIATED WITH TYPE 2 DIABETES MELLITUS: ICD-10-CM

## 2024-05-15 DIAGNOSIS — E11.69 HYPERLIPIDEMIA ASSOCIATED WITH TYPE 2 DIABETES MELLITUS: ICD-10-CM

## 2024-05-15 DIAGNOSIS — E11.65 TYPE 2 DIABETES MELLITUS WITH HYPERGLYCEMIA, WITHOUT LONG-TERM CURRENT USE OF INSULIN: ICD-10-CM

## 2024-05-15 LAB
CHOLEST SERPL-MCNC: 156 MG/DL (ref 120–199)
CHOLEST/HDLC SERPL: 3.6 {RATIO} (ref 2–5)
HDLC SERPL-MCNC: 43 MG/DL (ref 40–75)
HDLC SERPL: 27.6 % (ref 20–50)
LDLC SERPL CALC-MCNC: 93.6 MG/DL (ref 63–159)
NONHDLC SERPL-MCNC: 113 MG/DL
TRIGL SERPL-MCNC: 97 MG/DL (ref 30–150)

## 2024-05-15 PROCEDURE — 99999 PR PBB SHADOW E&M-EST. PATIENT-LVL IV: CPT | Mod: PBBFAC,,, | Performed by: FAMILY MEDICINE

## 2024-05-15 PROCEDURE — 99214 OFFICE O/P EST MOD 30 MIN: CPT | Mod: S$PBB,,, | Performed by: FAMILY MEDICINE

## 2024-05-15 PROCEDURE — 80061 LIPID PANEL: CPT | Performed by: FAMILY MEDICINE

## 2024-05-15 PROCEDURE — 99214 OFFICE O/P EST MOD 30 MIN: CPT | Mod: PBBFAC,PO | Performed by: FAMILY MEDICINE

## 2024-05-15 PROCEDURE — 36415 COLL VENOUS BLD VENIPUNCTURE: CPT | Mod: PO | Performed by: FAMILY MEDICINE

## 2024-05-15 RX ORDER — LOSARTAN POTASSIUM 25 MG/1
25 TABLET ORAL DAILY
Qty: 90 TABLET | Refills: 1 | Status: SHIPPED | OUTPATIENT
Start: 2024-05-15 | End: 2025-05-15

## 2024-05-15 NOTE — PROGRESS NOTES
Subjective     Patient ID: Ava Driscoll is a 53 y.o. female.    Chief Complaint: Hypertension    Hypertension  This is a chronic problem. The problem is uncontrolled. Pertinent negatives include no chest pain, headaches, malaise/fatigue, palpitations, peripheral edema or shortness of breath. Risk factors for coronary artery disease include diabetes mellitus, dyslipidemia, obesity and sedentary lifestyle. Past treatments include nothing. The current treatment provides no improvement. There are no compliance problems.      Past Medical History:   Diagnosis Date    Behavioral problem     Colon polyps     Diabetes mellitus     History of psychiatric care     History of psychiatric hospitalization     Hx of psychiatric care     Loud snoring     Obese     Psychiatric problem     Retinopathy due to secondary diabetes     Schizophrenia     Severe anemia 2021    Therapy       Past Surgical History:   Procedure Laterality Date     SECTION      COLONOSCOPY N/A 2020    Procedure: COLONOSCOPY;  Surgeon: Edvin Zuniga II, MD;  Location: [facility];  Service: Endoscopy;  Laterality: N/A;    HYSTERECTOMY       Family History   Problem Relation Name Age of Onset    Arthritis Mother      Diabetes type II Father      Breast cancer Maternal Aunt  80        unilat    Ovarian cancer Neg Hx       Social History     Socioeconomic History    Marital status: Single    Number of children: 2   Tobacco Use    Smoking status: Former     Types: Cigarettes    Smokeless tobacco: Never   Substance and Sexual Activity    Alcohol use: No    Drug use: Never     Social Determinants of Health     Stress: No Stress Concern Present (2019)    [employer] - Occupational Stress Questionnaire     Feeling of Stress : Not at all       Review of Systems   Constitutional:  Negative for malaise/fatigue.   Respiratory:  Negative for shortness of breath.    Cardiovascular:  Negative for chest pain and  "palpitations.   Neurological:  Negative for headaches.          Objective   Vitals:    05/15/24 0837 05/15/24 0851   BP: (!) 120/98 120/88   Pulse: 84    Temp: 98.2 °F (36.8 °C)    TempSrc: Oral    SpO2: 98%    Weight: 115.9 kg (255 lb 8.2 oz)    Height: 5' 4" (1.626 m)        Physical Exam  Constitutional:       General: She is not in acute distress.     Appearance: Normal appearance. She is well-developed. She is not ill-appearing, toxic-appearing or diaphoretic.   HENT:      Head: Normocephalic and atraumatic.   Eyes:      Conjunctiva/sclera: Conjunctivae normal.   Cardiovascular:      Rate and Rhythm: Normal rate and regular rhythm.      Heart sounds: Normal heart sounds. No murmur heard.     No friction rub. No gallop.   Pulmonary:      Effort: Pulmonary effort is normal. No respiratory distress.      Breath sounds: Normal breath sounds. No stridor. No wheezing, rhonchi or rales.   Musculoskeletal:      Cervical back: Normal range of motion and neck supple.   Neurological:      Mental Status: She is alert and oriented to person, place, and time.            Assessment and Plan     1. Type 2 diabetes mellitus with hyperglycemia, without long-term current use of insulin  -     Lipid Panel; Future; Expected date: 05/15/2024  Stable per endocrine, Dr. Goldstein. Due for lipid    2. Essential hypertension  -     losartan (COZAAR) 25 MG tablet; Take 1 tablet (25 mg total) by mouth once daily.  Dispense: 90 tablet; Refill: 1  -     Lipid Panel; Future; Expected date: 05/15/2024  Restarting losartan     3. Hyperlipidemia associated with type 2 diabetes mellitus  Due for lipid. Last was controlled               Follow up in about 6 months (around 11/15/2024).    "

## 2024-05-20 NOTE — TELEPHONE ENCOUNTER
LVM with information below.     Sowmya Hernandez  Lead   [facility]     Patient informed of below message.    ----- Message from Remington aVlencia MD sent at 8/17/2023 12:42 PM CDT -----  Please let her know that CBC looks good with great improvement from last visit  ----- Message -----  From: Robert, [facility] Interface  Sent: 8/17/2023  12:23 PM CDT  To: Remington Valencia MD

## 2024-06-06 DIAGNOSIS — E11.69 HYPERLIPIDEMIA ASSOCIATED WITH TYPE 2 DIABETES MELLITUS: ICD-10-CM

## 2024-06-06 DIAGNOSIS — E78.5 HYPERLIPIDEMIA ASSOCIATED WITH TYPE 2 DIABETES MELLITUS: ICD-10-CM

## 2024-06-06 RX ORDER — PRAVASTATIN SODIUM 40 MG/1
40 TABLET ORAL DAILY
Qty: 90 TABLET | Refills: 3 | Status: SHIPPED | OUTPATIENT
Start: 2024-06-06

## 2024-06-06 NOTE — TELEPHONE ENCOUNTER
----- Message from Allison Fitzgerald sent at 6/6/2024 10:32 AM CDT -----  Type: RX Refill Request    Who Called: self    Have you contacted your pharmacy:no    Refill or New Rx:Refill    RX Name and Strength:  pravastatin (PRAVACHOL) 40 MG tablet      Preferred Pharmacy with phone number:[facility]/pharmacy #[id number] - [place] - [address]   Phone: [phone]  Fax: [phone]          Local or Mail Order:local.    Would the patient rather a call back or a response via [facility]? call    Best Call Back Number:[phone] (home)       Additional Information:

## 2024-06-06 NOTE — TELEPHONE ENCOUNTER
No care due was identified.  [facility] Embedded Care Due Messages. Reference number: [id number].   6/06/2024 10:40:12 AM CDT

## 2024-08-01 ENCOUNTER — HOSPITAL ENCOUNTER (EMERGENCY)
Facility: HOSPITAL | Age: 54
Discharge: HOME | End: 2024-08-01
Attending: STUDENT IN AN ORGANIZED HEALTH CARE EDUCATION/TRAINING PROGRAM
Payer: MEDICARE

## 2024-08-01 VITALS
SYSTOLIC BLOOD PRESSURE: 126 MMHG | BODY MASS INDEX: 43.02 KG/M2 | TEMPERATURE: 99 F | RESPIRATION RATE: 20 BRPM | DIASTOLIC BLOOD PRESSURE: 78 MMHG | HEART RATE: 91 BPM | WEIGHT: 252 LBS | HEIGHT: 64 IN | OXYGEN SATURATION: 97 %

## 2024-08-01 DIAGNOSIS — L03.116 CELLULITIS OF LEFT LOWER EXTREMITY: Primary | ICD-10-CM

## 2024-08-01 DIAGNOSIS — M79.675 TOE PAIN, LEFT: ICD-10-CM

## 2024-08-01 LAB — POCT GLUCOSE: 102 MG/DL (ref 70–110)

## 2024-08-01 PROCEDURE — 99283 EMERGENCY DEPT VISIT LOW MDM: CPT | Mod: 25

## 2024-08-01 PROCEDURE — 82962 GLUCOSE BLOOD TEST: CPT

## 2024-08-01 RX ORDER — ACETAMINOPHEN 500 MG
500 TABLET ORAL EVERY 6 HOURS PRN
Qty: 30 TABLET | Refills: 0 | Status: SHIPPED | OUTPATIENT
Start: 2024-08-01

## 2024-08-01 RX ORDER — CEPHALEXIN 500 MG/1
1000 CAPSULE ORAL 2 TIMES DAILY
Qty: 28 CAPSULE | Refills: 0 | Status: SHIPPED | OUTPATIENT
Start: 2024-08-01 | End: 2024-08-08

## 2024-08-07 ENCOUNTER — OFFICE VISIT (OUTPATIENT)
Dept: PODIATRY | Facility: CLINIC | Age: 54
End: 2024-08-07
Payer: MEDICARE

## 2024-08-07 ENCOUNTER — HOSPITAL ENCOUNTER (OUTPATIENT)
Dept: RADIOLOGY | Facility: HOSPITAL | Age: 54
Discharge: HOME | End: 2024-08-07
Attending: PODIATRIST
Payer: MEDICARE

## 2024-08-07 VITALS — WEIGHT: 252 LBS | HEIGHT: 64 IN | BODY MASS INDEX: 43.02 KG/M2

## 2024-08-07 DIAGNOSIS — E11.9 CONTROLLED TYPE 2 DIABETES MELLITUS WITHOUT COMPLICATION, WITHOUT LONG-TERM CURRENT USE OF INSULIN: Primary | ICD-10-CM

## 2024-08-07 DIAGNOSIS — S99.922A INJURY OF TOE ON LEFT FOOT, INITIAL ENCOUNTER: ICD-10-CM

## 2024-08-07 PROCEDURE — 99999 PR PBB SHADOW E&M-EST. PATIENT-LVL III: CPT | Mod: PBBFAC,,, | Performed by: PODIATRIST

## 2024-08-07 PROCEDURE — 73660 X-RAY EXAM OF TOE(S): CPT | Mod: TC,FY,PO,LT

## 2024-08-07 PROCEDURE — 99213 OFFICE O/P EST LOW 20 MIN: CPT | Mod: PBBFAC,PO,25 | Performed by: PODIATRIST

## 2024-08-07 PROCEDURE — 99213 OFFICE O/P EST LOW 20 MIN: CPT | Mod: S$PBB,,, | Performed by: PODIATRIST

## 2024-08-07 PROCEDURE — 73660 X-RAY EXAM OF TOE(S): CPT | Mod: 26,LT,, | Performed by: RADIOLOGY

## 2024-08-07 RX ORDER — TOBRAMYCIN 3 MG/ML
1 SOLUTION/ DROPS OPHTHALMIC 2 TIMES DAILY
Qty: 5 ML | Refills: 0 | Status: SHIPPED | OUTPATIENT
Start: 2024-08-07

## 2024-11-04 DIAGNOSIS — Z12.31 ENCOUNTER FOR SCREENING MAMMOGRAM FOR BREAST CANCER: Primary | ICD-10-CM

## 2024-11-08 NOTE — TELEPHONE ENCOUNTER
Will review and complete.   p/w 3 days of SOB, cough, fatigue, myalgias, sore throat  COVID-19 positive in ED    Supportive Care as needed  monitor off antibiotics, anti virals, and steroids as pt is afebrile, not hypoxic with labs unremarkable

## 2024-11-26 ENCOUNTER — HOSPITAL ENCOUNTER (OUTPATIENT)
Dept: RADIOLOGY | Facility: HOSPITAL | Age: 54
Discharge: HOME | End: 2024-11-26
Attending: FAMILY MEDICINE
Payer: MEDICARE

## 2024-11-26 DIAGNOSIS — Z12.31 ENCOUNTER FOR SCREENING MAMMOGRAM FOR BREAST CANCER: ICD-10-CM

## 2024-11-26 PROCEDURE — 77067 SCR MAMMO BI INCL CAD: CPT | Mod: TC

## 2025-01-02 ENCOUNTER — TELEPHONE (OUTPATIENT)
Dept: FAMILY MEDICINE | Facility: CLINIC | Age: 55
End: 2025-01-02
Payer: MEDICARE

## 2025-01-02 DIAGNOSIS — Z12.11 ENCOUNTER FOR COLORECTAL CANCER SCREENING: Primary | ICD-10-CM

## 2025-01-02 DIAGNOSIS — Z12.12 ENCOUNTER FOR COLORECTAL CANCER SCREENING: Primary | ICD-10-CM

## 2025-01-02 NOTE — TELEPHONE ENCOUNTER
----- Message from Donaldo Larry sent at 1/2/2025 12:13 PM CST -----  Type:  Patient Requesting Referral    Who Called:  Pt   Referral to What Specialty:  Endoscopy   Reason for Referral:  Coloscopy   Does the patient want the referral with a specific physician?:    [facility] the [facility]  [facility]   Would the patient rather a call back or a response via [facility]?  Callback   Best Call Back Number:  Telephone Information:  Mobile          [phone]     Additional Information

## 2025-01-04 ENCOUNTER — CLINICAL SUPPORT (OUTPATIENT)
Dept: ENDOSCOPY | Facility: HOSPITAL | Age: 55
End: 2025-01-04
Attending: FAMILY MEDICINE
Payer: MEDICARE

## 2025-01-04 VITALS — HEIGHT: 64 IN | WEIGHT: 253 LBS | BODY MASS INDEX: 43.19 KG/M2

## 2025-01-04 DIAGNOSIS — Z12.12 ENCOUNTER FOR COLORECTAL CANCER SCREENING: ICD-10-CM

## 2025-01-04 DIAGNOSIS — Z12.11 ENCOUNTER FOR COLORECTAL CANCER SCREENING: ICD-10-CM

## 2025-01-06 ENCOUNTER — TELEPHONE (OUTPATIENT)
Dept: ENDOSCOPY | Facility: HOSPITAL | Age: 55
End: 2025-01-06
Payer: MEDICARE

## 2025-01-06 ENCOUNTER — TELEPHONE (OUTPATIENT)
Dept: FAMILY MEDICINE | Facility: CLINIC | Age: 55
End: 2025-01-06
Payer: MEDICARE

## 2025-01-06 VITALS — WEIGHT: 253 LBS | BODY MASS INDEX: 43.19 KG/M2 | HEIGHT: 64 IN

## 2025-01-06 DIAGNOSIS — Z12.11 ENCOUNTER FOR SCREENING COLONOSCOPY: Primary | ICD-10-CM

## 2025-01-06 NOTE — TELEPHONE ENCOUNTER
Spoke w/ PT. She called to ask for assistance in reaching the endoscopy scheduling dept. She had her pre-admit call on Saturday, but did not have a ride to be able to schedule the colonoscopy. She attempted to call the scheduling dept several times, but could not get through.  This nurse contacted scheduling. Spoke w/ Kiera. She will contact the patient to schedule her colonoscopy.

## 2025-01-06 NOTE — TELEPHONE ENCOUNTER
----- Message from Suraj sent at 1/6/2025 10:10 AM CST -----  Regarding: self  Type: Patient Call Back    Who called:self    What is the request in detail: pt is requesting a script for her sinuses, stated she have watery eyes, congested,      Can the clinic reply by [facility]?callback    Would the patient rather a call back or a response via [facility]? callback    Best call back number:[phone]    Additional Information:  [facility]/pharmacy #[id number] - [place] - [address] [place] [address]  Phone: [phone] Fax: [phone]

## 2025-01-06 NOTE — TELEPHONE ENCOUNTER
Referral for procedure from  Workqueue referral (see Appts tab)      Spoke to Pt to schedule procedure(s) Colonoscopy       Physician to perform procedure(s) Dr. CEDRIC Chand  Date of Procedure (s) 2/28/25  Arrival Time 12:00 PM  Time of Procedure(s) 1:00 PM   Location of Procedure(s) [address]   Type of Rx Prep sent to patient: PEG  Instructions provided to patient via Postal Mail    Patient was informed on the following information and verbalized understanding. Screening questionnaire reviewed with patient and complete. If procedure requires anesthesia, a responsible adult needs to be present to accompany the patient home, patient cannot drive after receiving anesthesia. Appointment details are tentative, especially check-in time. Patient will receive a prep-op call 7 days prior to confirm check-in time for procedure. If applicable the patient should contact their pharmacy to verify Rx for procedure prep is ready for pick-up. Patient was advised to call the scheduling department at [phone] if pharmacy states no Rx is available. Patient was advised to call the endoscopy scheduling department if any questions or concerns arise.       Endoscopy Scheduling Department            Colonoscopy Procedure Prep Instructions    Date of procedure: 2/28/25 Arrive at: 12:00 PM    Location of Department:   [facility] [address] [place] [address]  Take the Elevators to 2nd Floor Endoscopy Procedural Area    As soon as possible:   your prep from pharmacy and over the counter DULCOLAX LAXATIVE TABLETS            On the day before your procedure   What You CAN do:   You may have clear liquids ONLY-see below for list.     Liquids That Are OK to Drink:   Water  Sports drinks (Gatorade, Power-Aid)  Coffee or tea (no cream or nondairy creamer)  Clear juices without pulp (apple, white grape)  Gelatin desserts (no fruit or toppings)  Clear soda (sprite, coke, ginger ale)  Chicken broth (until 12  midnight the night before procedure)    What You CANNOT do:   Do not EAT solid food, drink milk or anything   colored red.  Do not drink alcohol.  Do not take oral medications within 1 hour of starting   each dose of prep.  No gum chewing or candy morning of procedure                       Note:   (Please disregard the insert instructions from pharmacy).  PEG Bowel Prep is indicated for cleansing of the colon as a preparation for colonoscopy in adults.   Be sure to tell your doctor about all the medicines you take, including prescription and non-prescription medicines, vitamins, and herbal supplements. PEG Bowel Prep may affect how other medicines work.  Medication taken by mouth may not be absorbed properly when taken within 1 hour before the start of each dose of PEG Bowel Prep.    It is not uncommon to experience some abdominal cramping, nausea and/or vomiting when taking the prep. If you have nausea and/or vomiting while taking the prep, stop drinking for 20 to 30 minutes then continue.      How to take prep:    PEG Bowel Prep is a (2-day) prep.    One (1) bottle of prep are required for a complete preparation for colonoscopy. Dilute the solution concentrate as directed prior to use. You must drink water with each dose of prep, and additional water after each dose.    DOSE 1--Day Before Colonoscopy 2/27/25     Drink at least 6 to 8 glasses of clear liquids from time you wake up until you begin your prep and then continue until bedtime to avoid dehydration.     12:00 pm (NOON) Mix your entire container of prep with lukewarm water and refrigerate. Take four (4) Dulcolax (Bisacodyl) tablets with at least 8 ounces or more of clear liquids.       6:00 pm:    You must complete Steps 1 and 2 below before going to bed:    Step 1-Drink half the liquid in the container within one (1) hour.   Step 2-Refrigerate the remaining half of the liquid for dose 2. See below when to begin this step.                       IMPORTANT:  If you experience preparation-related symptoms (for example, nausea, bloating, or cramping), stop, or slow the rate of drinking the additional water until your symptoms decrease.    DOSE 2--Day of the Colonoscopy 2/28/25 at 2-3 AM.    For this dose, repeat Step 1 shown above using the remaining half of the liquid prep.   You may continue drinking water/clear liquids until   2 hours before your colonoscopy or as directed by the scheduling nurse  11:00 AM.      For information about your procedure, two (2) things to view prior to colonoscopy:  Please watch this informational video. It is important to watch this animated consent video prior to your arrival. If you haven't watched the video prior to arriving, you are required to watch it during admission which can causes delays.    Options for viewing:   Using a keyboard:  press and hold the control tab (Ctrl) and left mouse click to follow links.           Colonoscopy Instructional Video                                                                                   OR    Type link address into your web browser's address bar:  [email or web address]/[email or web address]      Educational Booklet with pictures:      Colonoscopy Prep - Liquid      Comments:      IMPORTANT INFORMATION TO KNOW BEFORE YOUR PROCEDURE    [facility] 2nd Floor       When you arrive:  If your procedure is Monday - Friday 5am - 7pm - Please enter through the front door near [facility]. Please proceed up the first set of elevators to the 2nd floor where you will check in at the endo registration desk.     If your procedure is on a Saturday (weekend), enter through the back Outpatient entrance. Please note this entrance is diagonal from the Emergency Department entrance.              If your procedure requires the administration of anesthesia, it is necessary for a responsible adult to drive you home. (Medical Transportation, Uber, Lyft, Taxi, etc. may ONLY be used  if a responsible adult is present to accompany you home.  The responsible adult CAN'T be the  of the service).      person must be available to return to pick you up within 15 minutes of being notified of discharge.       Please bring a picture ID, insurance card, & copayment      Take Medications as directed below:    If you are taking any injectable medication (s) for weight loss and/or diabetes  weekly, hold for 8 days prior to your scheduled procedure, please stop taking Trulicity (Dulaglutide)on 2/20/25. After the procedure, your provider will inform you  of when to resume injection.      If you are taking the oral medication(s):   Invokana (Canagliflozin), Farxiga (Dapagliflozin), Jardiance (Empagliflozin), Invokamet/Invokamet XR (invokana +metformin), Xigduo (farxiga + metformin), Synjardy XR (jardiance + metformin), hold 3 days prior to your procedure, please stop taking Jardiance on 2/25/25.       If you begin taking any blood thinning medications, injectable weight loss/diabetes medications (other than insulin) , or Adipex (Phentermine) please contact the endoscopy scheduling department listed below as soon as possible.    If you are diabetic see the attached instruction sheet regarding your medication.     If you take HEART, BLOOD PRESSURE, SEIZURE, PAIN, LUNG (including inhalers/nebulizers), ANTI-REJECTION (transplant patients), or PSYCHIATRIC medications, please take at your regular times with a sip of water or as directed by the scheduling nurse.     Important contact information:    Endoscopy [phone] Hours of operation Monday-Friday 8:00-4:30pm.    Questions about insurance or financial obligations call [phone] or [phone].    If you have questions regarding the prep or need to reschedule, please call [phone]. After hours questions requiring immediate assistance, contact [facility] nurse line at [phone] or [phone].   NOTE:     On  occasion, unforeseen circumstances may cause a delay in your procedure start time. We respect your time and appreciate your patience during these circumstances.      Comments:     If you are unsure about any of these instructions, call [facility] at [phone].                          Oral Medicine  Day of Prep  Day of Procedure           Glyburide    Do NOT take morning of procedure. If you         Glucotrol (Glipizide)  Do NOT take. take twice daily, take with dinner.         Amaryl (Glimepiride)                                Glucophage    Do NOT take morning of procedure. Take         Glumetza  Take as  when you start eating again.          Fortamet (Metformin)  prescribed.                              Januvia (Sitaglipitin)    Do NOT take morning of procedure. Take         Nesina (Aloglipitin)    when you start eating again.          Onglyza (Saxaglipitin)  Take as              Tradjenta (Linaglipitin)  prescribed.                              Invokana (Canagliflozin)               Farxiga (Dapagliflozin)  Stop 2 days  Do NOT take morning of procedure. Take         Jardiance(Empagliflozin) before prep.  when you start eating again.                          Actos (Pioglitazone)  Take as  Do NOT take morning of procedure. Take           prescribed.  when you start eating again.                          Injectable & Combination Daily Dose  Weekly Dose           Medicine                Adlyxin (Lixisenatide)  If you take these For weekly dose, hold dose at least 8 days prior        Byetta (Exenatide)  medications daily to appointment. You may take the dose after your        Bydureon (Exenatide XL) on the day of your procedure.            Ozempic (Semaglutide)  appointment hold              Trulicity (Dulaglutide)  that dose until              Victoza (Liraglutide)  after your              Mounjaro (Tirzepatide)  procedure.              Carlos Rodriguez                It is important to  monitor your blood sugar while doing the bowel preparation. On the day of your bowel   prep, when you are on a clear liquid diet, you may drink beverages with sugar as your source of glucose.   Be sure to mix the prep with water or sugar free liquid only. Below are instructions on how to adjust your   diabetic medications prior to your scheduled procedure. Call the healthcare provider who manages your   diabetes if you have questions.   Insulin for Type 1   Diabetes Mellitus Day of Prep                                         Day of procedure          Basaglar If you use in the morning, take as prescribed.  If you take in the morning,         Lantus If you use in the evening, inject 70% of dose. inject 80% of dose. If you take        Levemir      in the evenings, inject usual         Toujeo      dose.           Keith Spencer Count carbs and adjust dose   Do NOT take morning of procedure.         Apidra accordingly. If not carb counting,  Take when you start eating again.         Humalog 100 take 25% of usual meal dose.             Humalog 200 May use correction dose every              Novolog 4 hours. Do NOT use once sugar-free             liquid prep is started.                             Insulin Pump Count carbs and adjust your   May use temp basal rate at 70 % starting          dose as needed. May use temp basal at midnight until after procedure.          rate at 70 % after sugar-free clear liquid             prep is started until midnight.                              Insulin for Type 2   Diabetes Mellitus Day of Prep                                         Day of procedure          Basaglar If you use in the morning, take as prescribed.  If you take in the morning,         Lantus If you use in the evening, inject 70% of dose. inject 80% of dose. If you take        Levemir      in the evenings, inject usual         Toujeo      dose.           Tresiba                                                 Afrezza Inject 50 % of dose with clear liquid diet.   Do NOT take morning of         Apidra Do NOT use once sugar-free clear liquid prep procedure. Take when you         Fiasp is started. If you are using a correction scale,  start eating meals again.         Humalog 100 take dose every 4 hours as needed.             Humalog 200                Novolog                                NPH  Inject 50% of breakfast and dinner   Do NOT take morning of         doses with clear liquid diet.    procedure. Take usual dose              when you eat dinner.                         Regular  Inject 50% of breakfast dose and do NOT take Do NOT take morning of          dinner dose once sugar-free liquid prep has   procedure. Take usual dose          started. If using correction scale, make take dose when you eat dinner.          every 6 hours as needed.                              Novolog Mix 70/30 Inject 50% of breakfast dose. Inject 25%  Do NOT take breakfast dose.         Humolog Mix 75/25 of dinner dose.     Take usual dose when you eat         Humolog Mix 50/50      dinner.                           U500 Take 50% of AM or breakfast unit kera dose.  Do NOT take mornining of           Take 25% of lunch or dinner or PM unit kera dose.  procedure. Take when you               start eating again.                          V-Go  Continue to wear your V-Go device. Do NOT click  Coninue to wear your V-Go         once sugar-free clear liquid prep is started.   device. Resume clicks with               meals.

## 2025-02-05 ENCOUNTER — LAB VISIT (OUTPATIENT)
Dept: LAB | Facility: HOSPITAL | Age: 55
End: 2025-02-05
Attending: HOSPITALIST
Payer: MEDICARE

## 2025-02-05 DIAGNOSIS — E55.9 VITAMIN D DEFICIENCY: ICD-10-CM

## 2025-02-05 DIAGNOSIS — E11.65 TYPE 2 DIABETES MELLITUS WITH HYPERGLYCEMIA, WITHOUT LONG-TERM CURRENT USE OF INSULIN: ICD-10-CM

## 2025-02-05 LAB
25(OH)D3+25(OH)D2 SERPL-MCNC: 22 NG/ML (ref 30–96)
ANION GAP SERPL CALC-SCNC: 8 MMOL/L (ref 8–16)
BUN SERPL-MCNC: 16 MG/DL (ref 6–20)
CALCIUM SERPL-MCNC: 9.2 MG/DL (ref 8.7–10.5)
CHLORIDE SERPL-SCNC: 107 MMOL/L (ref 95–110)
CO2 SERPL-SCNC: 28 MMOL/L (ref 23–29)
CREAT SERPL-MCNC: 0.9 MG/DL (ref 0.5–1.4)
EST. GFR  (NO RACE VARIABLE): >60 ML/MIN/1.73 M^2
ESTIMATED AVG GLUCOSE: 126 MG/DL (ref 68–131)
GLUCOSE SERPL-MCNC: 108 MG/DL (ref 70–110)
HBA1C MFR BLD: 6 % (ref 4–5.6)
POTASSIUM SERPL-SCNC: 4.8 MMOL/L (ref 3.5–5.1)
SODIUM SERPL-SCNC: 143 MMOL/L (ref 136–145)

## 2025-02-05 PROCEDURE — 36415 COLL VENOUS BLD VENIPUNCTURE: CPT | Performed by: HOSPITALIST

## 2025-02-05 PROCEDURE — 83036 HEMOGLOBIN GLYCOSYLATED A1C: CPT | Performed by: HOSPITALIST

## 2025-02-05 PROCEDURE — 82306 VITAMIN D 25 HYDROXY: CPT | Performed by: HOSPITALIST

## 2025-02-05 PROCEDURE — 80048 BASIC METABOLIC PNL TOTAL CA: CPT | Performed by: HOSPITALIST

## 2025-02-12 ENCOUNTER — OFFICE VISIT (OUTPATIENT)
Dept: ENDOCRINOLOGY | Facility: CLINIC | Age: 55
End: 2025-02-12
Payer: MEDICARE

## 2025-02-12 VITALS
HEART RATE: 82 BPM | WEIGHT: 252.81 LBS | BODY MASS INDEX: 43.39 KG/M2 | SYSTOLIC BLOOD PRESSURE: 130 MMHG | DIASTOLIC BLOOD PRESSURE: 90 MMHG

## 2025-02-12 DIAGNOSIS — E11.65 TYPE 2 DIABETES MELLITUS WITH HYPERGLYCEMIA, WITHOUT LONG-TERM CURRENT USE OF INSULIN: Primary | ICD-10-CM

## 2025-02-12 DIAGNOSIS — I10 ESSENTIAL HYPERTENSION: ICD-10-CM

## 2025-02-12 DIAGNOSIS — E11.69 HYPERLIPIDEMIA ASSOCIATED WITH TYPE 2 DIABETES MELLITUS: ICD-10-CM

## 2025-02-12 DIAGNOSIS — N25.81 SECONDARY HYPERPARATHYROIDISM: ICD-10-CM

## 2025-02-12 DIAGNOSIS — E55.9 VITAMIN D DEFICIENCY: ICD-10-CM

## 2025-02-12 DIAGNOSIS — F20.0 PARANOID SCHIZOPHRENIA: ICD-10-CM

## 2025-02-12 DIAGNOSIS — E66.813 CLASS 3 SEVERE OBESITY DUE TO EXCESS CALORIES WITH SERIOUS COMORBIDITY AND BODY MASS INDEX (BMI) OF 40.0 TO 44.9 IN ADULT: ICD-10-CM

## 2025-02-12 DIAGNOSIS — E78.5 HYPERLIPIDEMIA ASSOCIATED WITH TYPE 2 DIABETES MELLITUS: ICD-10-CM

## 2025-02-12 DIAGNOSIS — E66.01 CLASS 3 SEVERE OBESITY DUE TO EXCESS CALORIES WITH SERIOUS COMORBIDITY AND BODY MASS INDEX (BMI) OF 40.0 TO 44.9 IN ADULT: ICD-10-CM

## 2025-02-12 PROCEDURE — 99214 OFFICE O/P EST MOD 30 MIN: CPT | Mod: S$PBB,,, | Performed by: HOSPITALIST

## 2025-02-12 PROCEDURE — 99999 PR PBB SHADOW E&M-EST. PATIENT-LVL III: CPT | Mod: PBBFAC,,, | Performed by: HOSPITALIST

## 2025-02-12 PROCEDURE — 99213 OFFICE O/P EST LOW 20 MIN: CPT | Mod: PBBFAC | Performed by: HOSPITALIST

## 2025-02-12 PROCEDURE — G2211 COMPLEX E/M VISIT ADD ON: HCPCS | Mod: S$PBB,,, | Performed by: HOSPITALIST

## 2025-02-12 RX ORDER — DULAGLUTIDE 3 MG/.5ML
3 INJECTION, SOLUTION SUBCUTANEOUS
Qty: 12 PEN | Refills: 3 | Status: SHIPPED | OUTPATIENT
Start: 2025-02-12 | End: 2026-02-12

## 2025-02-12 RX ORDER — ERGOCALCIFEROL 1.25 MG/1
50000 CAPSULE ORAL
Qty: 12 CAPSULE | Refills: 3 | Status: SHIPPED | OUTPATIENT
Start: 2025-02-12

## 2025-02-12 RX ORDER — METFORMIN HYDROCHLORIDE 1000 MG/1
1000 TABLET ORAL
Qty: 90 TABLET | Refills: 3 | Status: SHIPPED | OUTPATIENT
Start: 2025-02-12

## 2025-02-12 NOTE — PATIENT INSTRUCTIONS
Metformin 1000 mg daily  Jardiance 25 mg daily  Trulicity 3.0 mg Q weekly injection, Sunday      Vit D: ergocalciferol 54030 ui once a week  Vit D3 2000 ui daily

## 2025-02-12 NOTE — ASSESSMENT & PLAN NOTE
- lipid panel review today  - ASCVD Risk below: Statin: Taking  The 10-year ASCVD risk score (Arianna CENTENO, et al., 2019) is: 11.6%    Values used to calculate the score:      Age: 54 years      Sex: Female      Is Non- : Yes      Diabetic: Yes      Tobacco smoker: No      Systolic Blood Pressure: 130 mmHg      Is BP treated: Yes      HDL Cholesterol: 43 mg/dL      Total Cholesterol: 156 mg/dL

## 2025-02-12 NOTE — PROGRESS NOTES
Subjective:      Patient ID: Ava Driscoll is a 54 y.o. female presented to [facility]  Chief Complaint:  Diabetes      History of Present Illness: Ava Driscoll is a 54 y.o. female here for type 2 diabetes  Other significant past medical history:  Schizophrenia (on Invega sustenna injection), obesity, HLD, recent COVID infection in 2020  Recent admission for:  anemia (s/p transfusion  RBC x 3 units on 12/17/2021), vitamin B 12 deficiency      Interval history:  Patient here for follow-up type 2 diabetes doing well.  A1c 6.0%  Tolerating Trulicity  3 mg once a week injection, doing well.  No issues including constipation or of the GI issue   No issues with Jardiance, denies UTI  Glucose log review show stable glucose.  No change in Invega  Patient reports living at home by herself, eat predominant prepackage food.   Continue vitamin-D supplements, improvement in PTH  Current weight in clinic: 252 lb, previous weight: 255 245, 242  Eye exam soon 4/2025        1) Diabetes Mellitus Type 2  - Known diabetic complications: none  - Diagnosed w/ DM: in 2013    Glucose log review show stable glucose:  No hypoglycemia  144  125  111  100  111  116    Current meds:   Metformin 1000 mg daily (unable to tolerate higher doses)  Jardiance 25 mg daily  Trulicity 3.0 mg Q weekly injection, sunday  Previous meds:    Glimepiride    Home glucose checks:  Difficult to check glucose, glucose logs available for review today   Hypoglycemia: denies  Diet/Exercise:               Eating 2 meals per day, skip breakfast, eat lunch and dinner, Lean Cuisine  Weight trend: stable  Diabetes Education: No  Diabetes Related Hospitalization:  No  Hx of pancreatitis, hx of thyroid cancer: No  Working: disabled     Statin: Taking, ACE/ARB: Not taking    Diabetes lab work  Lab Results   Component Value Date    HGBA1C 6.0 (H) 02/05/2025    HGBA1C 6.0 (H) 04/10/2024    HGBA1C 5.9 (H) 02/09/2024    HGBA1C 5.7 (H) 09/26/2023     No results  "found for: "CPEPTIDE", "GLUTAMICACID", "ISLETCELLANT"   Lab Results   Component Value Date    FRUCTOSAMINE 199 04/22/2022     Lab Results   Component Value Date    MICALBCREAT 6.0 02/05/2025     Lab Results   Component Value Date    JDMLDIXC34 1178 (H) 02/09/2024     Diabetes Management Status: Reviewed this office visit  Screening or Prevention Patient's value Goal Complete/Controlled?   Lipid profile : 05/15/2024 Annually Yes     Dilated retinal exam : 10/18/2021 Annually Yes     Foot exam   : 08/07/2024 Annually Yes         2) Hyperparathyroidism  - no hypercalcemia  - history of hypocalcemia  - not on hydrochlorothiazide   - vitamin-D deficiency noted >> on Ergocal  - repeat level: Results WNL, PTH 74.   - RESOLVED PTH elevation in the past     Lab Results   Component Value Date    PTH 74.1 01/23/2023    .9 (H) 04/22/2022    .6 (H) 03/20/2019    [id number] 22 (L) 02/05/2025    [id number] 29 (L) 09/26/2023    [id number] 28 (L) 08/30/2022    CALCIUM 9.2 02/05/2025    CALCIUM 9.3 04/10/2024    CALCIUM 8.8 02/09/2024    PHOS 3.0 11/15/2011    PHOS 3.4 11/14/2011    ALKPHOS 71 09/26/2023    ALKPHOS 131 04/22/2022    ALKPHOS 53 (L) 12/18/2021     Reviewed past surgical, medical, family, social history and updated as appropriate.  Review of Systems: see HPI above    Objective:   BP (!) 130/90   Pulse 82   Wt 114.7 kg (252 lb 12.8 oz)   BMI 43.39 kg/m²     Body mass index is 43.39 kg/m².  Vital signs reviewed    Physical Exam  Vitals and nursing note reviewed.   Constitutional:       Appearance: Normal appearance. She is well-developed. She is obese. She is not ill-appearing.   Neck:      Thyroid: No thyromegaly.   Pulmonary:      Effort: Pulmonary effort is normal. No respiratory distress.   Musculoskeletal:         General: Normal range of motion.      Cervical back: Normal range of motion.   Neurological:      General: No focal deficit present.      Mental Status: She is alert. Mental status is " at baseline.   Psychiatric:         Mood and Affect: Mood normal.         Behavior: Behavior normal.       Lab Reviewed:   Lab Results   Component Value Date    CHOL 156 05/15/2024    HDL 43 05/15/2024    LDLCALC 93.6 05/15/2024    TRIG 97 05/15/2024    CHOLHDL 27.6 05/15/2024     Lab Results   Component Value Date     02/05/2025    K 4.8 02/05/2025     02/05/2025    CO2 28 02/05/2025     02/05/2025    BUN 16 02/05/2025    CREATININE 0.9 02/05/2025    CALCIUM 9.2 02/05/2025    PROT 7.5 09/26/2023    ALBUMIN 3.6 09/26/2023    BILITOT 0.5 09/26/2023    ALKPHOS 71 09/26/2023    AST 15 09/26/2023    ALT 12 09/26/2023    ANIONGAP 8 02/05/2025    ESTGFRAFRICA >60 04/22/2022    EGFRNONAA 58 (A) 04/22/2022    TSH 2.927 12/18/2021     Assessment     1. Type 2 diabetes mellitus with hyperglycemia, without long-term current use of insulin  Basic Metabolic Panel    Hemoglobin A1C    empagliflozin (JARDIANCE) 25 mg tablet    metFORMIN (GLUCOPHAGE) 1000 MG tablet    TRULICITY 3 mg/0.5 mL pen injector      2. Vitamin D deficiency  ergocalciferol (ERGOCALCIFEROL) 50,000 unit Cap    Vitamin D      3. Class 3 severe obesity due to excess calories with serious comorbidity and body mass index (BMI) of 40.0 to 44.9 in adult        4. Hyperlipidemia associated with type 2 diabetes mellitus        5. Paranoid schizophrenia        6. Essential hypertension        7. Secondary hyperparathyroidism          Plan     Type 2 diabetes mellitus with hyperglycemia, without long-term current use of insulin  - Diabetes is AT GOAL:  controlled given glucose log, suspect A1c will be at goal A1C  - A1c goal for patient is <7%  - Complicated by schizophrenia and the injectable Invega sustanna which has been known to lead to hyperglycemia  - has been doing much better job at glycemic control.  Glucose log review show very stable glucose no hypoglycemia  - Continue reinforcement of dietary modification, portion size control, decreasing  carbohydrates intake  - encourage patient for continue weight loss    Plan  - Continue metformin 1000 mg daily (unable to tolerate higher doses), continue Jardiance 25 mg daily  - continue Trulicity to 3.0 mg once a week injection  - Clear written instruction given on AVS and Follow up as schedule  - follow-up with 5-6 months, check A1c    Paranoid schizophrenia  - patient on Invega sustained a which has been shown to cause hyperglycemia  - continue follow-up with psychiatry  - difficult to manage diabetes in setting of paranoid/schizophrenic    Essential hypertension  - Chronic, blood pressure reviewed  - Continue management by PCP    Class 3 severe obesity due to excess calories with serious comorbidity and body mass index (BMI) of 40.0 to 44.9 in adult  - Body mass index is 43.39 kg/m².  - dietary discussion as above  - continue to monitor weight  - continue Trulicity, encourage continue weight loss    Vitamin D deficiency  - Lab work reviewed, and discussed with patient in clinic  - Vitamin-D goal >30  - Currently on  Vit D2 [id number] qweekly, recommend OTC vitamin D3 2000 IU daily as well  - lab work every 6 mo or yearly    Secondary hyperparathyroidism  - Resolved    Hyperlipidemia associated with type 2 diabetes mellitus  - lipid panel review today  - ASCVD Risk below: Statin: Taking  The 10-year ASCVD risk score (Arianna DK, et al., 2019) is: 11.6%    Values used to calculate the score:      Age: 54 years      Sex: Female      Is Non- : Yes      Diabetic: Yes      Tobacco smoker: No      Systolic Blood Pressure: 130 mmHg      Is BP treated: Yes      HDL Cholesterol: 43 mg/dL      Total Cholesterol: 156 mg/dL    RTC in 5-6 months    Visit today included increased complexity associated with the care of the episodic problem addressed and managing the longitudinal care of the patient due to the serious and/or complex managed problem(s).   Including: Type 2 diabetes, obesity, vitamin-D  deficiency, hypertension, hyperlipidemia, schizophrenia    Yvon Goldstein M.D  Endocrinology  [facility]  2/12/2025      Disclaimer: This note has been generated using voice-recognition software. There may be typographical errors that have been missed during proof-reading.

## 2025-02-12 NOTE — ASSESSMENT & PLAN NOTE
- Body mass index is 43.39 kg/m².  - dietary discussion as above  - continue to monitor weight  - continue Trulicity, encourage continue weight loss

## 2025-02-12 NOTE — ASSESSMENT & PLAN NOTE
- Lab work reviewed, and discussed with patient in clinic  - Vitamin-D goal >30  - Currently on  Vit D2 [id number] qweekly, recommend OTC vitamin D3 2000 IU daily as well  - lab work every 6 mo or yearly

## 2025-02-12 NOTE — ASSESSMENT & PLAN NOTE
- Diabetes is AT GOAL:  controlled given glucose log, suspect A1c will be at goal A1C  - A1c goal for patient is <7%  - Complicated by schizophrenia and the injectable Invega sustanna which has been known to lead to hyperglycemia  - has been doing much better job at glycemic control.  Glucose log review show very stable glucose no hypoglycemia  - Continue reinforcement of dietary modification, portion size control, decreasing carbohydrates intake  - encourage patient for continue weight loss    Plan  - Continue metformin 1000 mg daily (unable to tolerate higher doses), continue Jardiance 25 mg daily  - continue Trulicity to 3.0 mg once a week injection  - Clear written instruction given on AVS and Follow up as schedule  - follow-up with 5-6 months, check A1c

## 2025-02-17 ENCOUNTER — LAB VISIT (OUTPATIENT)
Dept: LAB | Facility: HOSPITAL | Age: 55
End: 2025-02-17
Attending: STUDENT IN AN ORGANIZED HEALTH CARE EDUCATION/TRAINING PROGRAM
Payer: MEDICARE

## 2025-02-17 DIAGNOSIS — E53.8 VITAMIN B12 DEFICIENCY: ICD-10-CM

## 2025-02-17 LAB
BASOPHILS # BLD AUTO: 0.06 K/UL (ref 0–0.2)
BASOPHILS NFR BLD: 0.8 % (ref 0–1.9)
DIFFERENTIAL METHOD BLD: ABNORMAL
EOSINOPHIL # BLD AUTO: 0.3 K/UL (ref 0–0.5)
EOSINOPHIL NFR BLD: 4.1 % (ref 0–8)
ERYTHROCYTE [DISTWIDTH] IN BLOOD BY AUTOMATED COUNT: 14.3 % (ref 11.5–14.5)
HCT VFR BLD AUTO: 47.7 % (ref 37–48.5)
HGB BLD-MCNC: 14.4 G/DL (ref 12–16)
IMM GRANULOCYTES # BLD AUTO: 0.02 K/UL (ref 0–0.04)
IMM GRANULOCYTES NFR BLD AUTO: 0.3 % (ref 0–0.5)
LYMPHOCYTES # BLD AUTO: 3 K/UL (ref 1–4.8)
LYMPHOCYTES NFR BLD: 42.4 % (ref 18–48)
MCH RBC QN AUTO: 25.7 PG (ref 27–31)
MCHC RBC AUTO-ENTMCNC: 30.2 G/DL (ref 32–36)
MCV RBC AUTO: 85 FL (ref 82–98)
MONOCYTES # BLD AUTO: 0.5 K/UL (ref 0.3–1)
MONOCYTES NFR BLD: 7 % (ref 4–15)
NEUTROPHILS # BLD AUTO: 3.2 K/UL (ref 1.8–7.7)
NEUTROPHILS NFR BLD: 45.4 % (ref 38–73)
NRBC BLD-RTO: 0 /100 WBC
PLATELET # BLD AUTO: 250 K/UL (ref 150–450)
PMV BLD AUTO: 9.6 FL (ref 9.2–12.9)
RBC # BLD AUTO: 5.61 M/UL (ref 4–5.4)
VIT B12 SERPL-MCNC: 1032 PG/ML (ref 210–950)
WBC # BLD AUTO: 7.14 K/UL (ref 3.9–12.7)

## 2025-02-17 PROCEDURE — 85025 COMPLETE CBC W/AUTO DIFF WBC: CPT | Performed by: STUDENT IN AN ORGANIZED HEALTH CARE EDUCATION/TRAINING PROGRAM

## 2025-02-17 PROCEDURE — 83921 ORGANIC ACID SINGLE QUANT: CPT | Performed by: STUDENT IN AN ORGANIZED HEALTH CARE EDUCATION/TRAINING PROGRAM

## 2025-02-17 PROCEDURE — 82607 VITAMIN B-12: CPT | Performed by: STUDENT IN AN ORGANIZED HEALTH CARE EDUCATION/TRAINING PROGRAM

## 2025-02-20 LAB — METHYLMALONATE SERPL-SCNC: 0.15 UMOL/L

## 2025-02-26 ENCOUNTER — PATIENT MESSAGE (OUTPATIENT)
Dept: ENDOSCOPY | Facility: HOSPITAL | Age: 55
End: 2025-02-26
Payer: MEDICARE

## 2025-02-26 ENCOUNTER — TELEPHONE (OUTPATIENT)
Dept: ENDOSCOPY | Facility: HOSPITAL | Age: 55
End: 2025-02-26
Payer: MEDICARE

## 2025-02-26 ENCOUNTER — TELEPHONE (OUTPATIENT)
Dept: ENDOCRINOLOGY | Facility: CLINIC | Age: 55
End: 2025-02-26
Payer: MEDICARE

## 2025-02-26 NOTE — TELEPHONE ENCOUNTER
----- Message from Tech Debra sent at 2/26/2025 10:16 AM CST -----  Regarding: Patient call back  .Type: Patient Call BackWho called:self What is the request in detail:caller states insurance([employer]) has sent over fax to office in regards to her Trulicity medication Can the clinic reply by [facility]?no Would the patient rather a call back or a response via [facility]? Call [facility] call back number:.[phone] Information:

## 2025-02-26 NOTE — TELEPHONE ENCOUNTER
Referral for procedure from PAT [id number]      Spoke to pt to schedule procedure(s) Colonoscopy       Physician to perform procedure(s) Dr. CEDRIC Chand  Date of Procedure (s) 3/21/25  Arrival Time 7:15 AM  Time of Procedure(s) 8:15 AM   Location of Procedure(s) [facility] 2nd Floor   Type of Rx Prep sent to patient: PEG  Instructions provided to patient via Postal Mail    Patient was informed on the following information and verbalized understanding. Screening questionnaire reviewed with patient and complete. If procedure requires anesthesia, a responsible adult needs to be present to accompany the patient home, patient cannot drive after receiving anesthesia. Appointment details are tentative, especially check-in time. Patient will receive a prep-op call 7 days prior to confirm check-in time for procedure. If applicable the patient should contact their pharmacy to verify Rx for procedure prep is ready for pick-up. Patient was advised to call the scheduling department at [phone] if pharmacy states no Rx is available. Patient was advised to call the endoscopy scheduling department if any questions or concerns arise.      [facility] Endoscopy Scheduling Department

## 2025-03-03 ENCOUNTER — TELEPHONE (OUTPATIENT)
Dept: ENDOCRINOLOGY | Facility: CLINIC | Age: 55
End: 2025-03-03
Payer: MEDICARE

## 2025-03-03 ENCOUNTER — TELEPHONE (OUTPATIENT)
Dept: ENDOSCOPY | Facility: HOSPITAL | Age: 55
End: 2025-03-03
Payer: MEDICARE

## 2025-03-03 NOTE — TELEPHONE ENCOUNTER
----- Message from Sivan sent at 3/3/2025  8:54 AM CST -----  Regarding: FW: pt is requesting a different arrival time for proc 3/21  Contact: [phone]    ----- Message -----  From: Jossie Marks MA  Sent: 2/28/2025   2:43 PM CST  To: [facility] Patients  Subject: pt is requesting a different arrival time fo#      ----- Message -----  From: Dianne Morin  Sent: 2/28/2025   2:30 PM CST  To: Mannie Ceja Staff  Subject: appt access                                      Patient calling to request colonoscopy time to be changed to after 9am due to transportation. Pls call

## 2025-03-03 NOTE — TELEPHONE ENCOUNTER
Referral for procedure from [facility]    Spoke to Ava Driscoll to schedule Colonoscopy       Physician to perform procedure(s) Dr. AJAY Cornelius   Date of Procedure (s) 4/8/25  Arrival Time 11:45 AM  Time of Procedure(s) 12:45 PM   Location of Procedure(s) [facility] 2nd Floor   Type of Rx Prep patient already received from pharmacy: PEG  Instructions provided to patient via Postal Mail  Patient denies use of blood thinners.  Patient is taking Trulicity (Dulaglutide), instructed to take last dose on/before 3/31/25.   Patient is taking the oral medication(s):  Jardiance (Empagliflozin), instructed to take last dose on 4/4/25.   The following information was discussed with patient, and patient verbalized understanding:  Screening questionnaire reviewed with patient and complete. If procedure requires anesthesia, a responsible adult needs to be present to accompany the patient home. Appointment details are tentative, especially check-in time. Patient will receive a pre-op call 7 days prior to appointment to confirm check-in time for procedure. If applicable the patient should contact their pharmacy to verify Rx for procedure prep is ready for pick-up. Patient was instructed to call the scheduling department at [phone] if pharmacy states no Rx is available. Patient was also advised to call the endoscopy scheduling department if any questions or concerns arise.       Endoscopy Scheduling Department        Instructions for Colonoscopy  PEG (polyethylene glycol) - Common Brands: Golytely, Colyte, Nulytely, Gavilyte, Trilyte    Date of procedure: 4/8/25 - Arrive at 11:45 AM    Location of Department:   [facility] [address], [place] [address]  Take the Elevators to 2nd Floor Endoscopy Procedural Area    Trulicity (Dulaglutide)- If you are taking any injectable medications(s) weekly for weight loss and/or diabetes (other than insulin), please take last dose on or before 3/31/25.    If you are taking  the oral medication(s):  Jardiance (Empagliflozin), take last dose on 4/4/25.     As soon as possible:   your prep from pharmacy and over the counter DULCOLAX LAXATIVE TABLETS (Bisacodyl 5 mg oral tab)     On the ENTIRE day before your procedure:  You may ONLY have clear liquids, such as:  Water  Sports drinks (Gatorade, Power-Aid)  Coffee or tea (no cream or nondairy creamer)  Clear juices without pulp (apple, white grape)  Gelatin desserts (no fruit or toppings)  Clear soda (Sprite, Coke, ginger ale)  Chicken broth (until 12 midnight the night before procedure)    What You CANNOT do:   Do not EAT solid food.  Do not drink milk or anything colored red.  Do not drink alcohol.  Do not take oral medications within 1 hour of starting each dose of prep.  No gum chewing or candy morning of procedure.                       Note:   Disregard the insert instructions from pharmacy.  Medication taken by mouth may not be absorbed properly when taken within 1 hour before the start of each dose of PEG Bowel Prep.  It is not uncommon to experience some abdominal cramping, nausea and/or vomiting when taking the prep. If you have nausea and/or vomiting while taking the prep, stop drinking for 20 to 30 minutes then continue.    How to take prep:    PEG Bowel Prep is a (2-day) prep.    One (1) bottle of prep is required for a complete preparation for colonoscopy. You must drink water with each dose of prep, and additional water after each dose.    DOSE 1--Day Before Colonoscopy 4/7/25     Drink at least 6 to 8 glasses of clear liquids from time you wake up until you begin your prep, and then continue until bedtime to avoid dehydration.     12:00 pm (NOON) Mix your entire container of prep with lukewarm water and refrigerate. Take four (4) Dulcolax (Bisacodyl) tablets with at least 8 ounces or more of clear liquids.       6:00 pm:  You must complete Steps 1 and 2 below before going to bed:    Step 1- Drink half of the liquid in  the container within one (1) hour.   Step 2- Refrigerate the remaining half of the liquid for dose 2.                       DOSE 2--Day of the Colonoscopy 4/8/25 at 2-3 AM    For this dose, repeat Step 1 shown above using the remaining half of the liquid prep.   You may continue drinking water/clear liquids until (10:45 AM).    For more information about your procedure, please note the two items below:    Please watch this informational video. It is important to watch this animated consent video prior to your arrival. If you haven't watched the video prior to arriving, you will be asked to watch it during admission, which can cause delays.     Options for viewing:  Using a keyboard:  press and hold the control tab (Ctrl) and left mouse click to follow link Colonoscopy Instructional Video                                                        OR    Type link address into your web browser's address bar:  [email or web address]    2. Educational Booklet Colonoscopy Prep - Liquid        IMPORTANT INFORMATION TO KNOW BEFORE YOUR PROCEDURE  [facility] 2nd Floor   Please enter through the front door near [facility]. Then, proceed up the first set of elevators to the 2nd floor where you will check in at the endo registration desk.     If your procedure requires the administration of anesthesia, it is necessary for a responsible adult to drive you home. (Medical Transportation, Uber, Lyft, Taxi, etc. may ONLY be used if a responsible adult is present to accompany you home.  The responsible adult CANNOT be the  of the service).   person must be available to return to pick you up within 15 minutes of being notified of discharge.    Please bring a picture ID, insurance card, & copayment    Take Medications as directed below:    Trulicity (Dulaglutide)- If you are taking any injectable medications(s) weekly for weight loss and/or diabetes (other than insulin),  please take last dose on or before 3/31/25.    If you are taking the oral medication(s):  Jardiance (Empagliflozin), take last dose on 4/4/25.     If you begin taking any new blood thinning medications, injectable weight loss/diabetes medications (other than insulin), or Adipex (phentermine) please contact the endoscopy scheduling department as soon as possible. [phone]    If you are diabetic, see the attached instructions sheet regarding your medication(s).    If you take HEART, BLOOD PRESSURE, SEIZURE, PAIN, LUNG (including inhalers/nebulizers), ANTI-REJECTION (transplant patients) OR PSYCHIATRIC medications, please take at your regular times with a sip of water, unless otherwise directed by the scheduling nurse.    Important contact information:    Endoscopy Scheduling [phone] / Hours of operation Monday-Friday 8:00 AM-4:30 PM    Questions about insurance or financial obligations call [phone] or [phone]    After hours questions requiring immediate assistance, contact [facility] On-Call Nurse Line at [phone] or [phone]    Cancellations: Please call [phone] to cancel/reschedule if the need arises. We understand that unpredictable situations may occur that cause you to need to reschedule, but we ask that you let us know as far ahead of time as possible since there is a high demand for appointments.    NOTE - On occasion, unforeseen circumstances may cause a delay in your procedure start time. We respect your time and appreciate your patience during these circumstances.            If you are unsure about any of these instructions, call [facility] at [phone].                          Oral Medicine  Day of Prep  Day of Procedure           Glyburide    Do NOT take morning of procedure. If you         Glucotrol (Glipizide)  Do NOT take. take twice daily, take with dinner.         Amaryl (Glimepiride)                                Glucophage    Do NOT take morning  of procedure. Take         Glumetza  Take as  when you start eating again.          Fortamet (Metformin)  prescribed.                              Januvia (Sitaglipitin)    Do NOT take morning of procedure. Take         Nesina (Aloglipitin)    when you start eating again.          Onglyza (Saxaglipitin)  Take as              Tradjenta (Linaglipitin)  prescribed.                              Invokana (Canagliflozin)               Farxiga (Dapagliflozin)  Stop 2 days  Do NOT take morning of procedure. Take         Jardiance(Empagliflozin) before prep.  when you start eating again.                          Actos (Pioglitazone)  Take as  Do NOT take morning of procedure. Take           prescribed.  when you start eating again.                          Injectable & Combination Daily Dose  Weekly Dose           Medicine                Adlyxin (Lixisenatide)  If you take these For weekly dose, hold dose at least 8 days prior        Byetta (Exenatide)  medications daily to appointment. You may take the dose after your        Bydureon (Exenatide XL) on the day of your procedure.            Ozempic (Semaglutide)  appointment hold              Trulicity (Dulaglutide)  that dose until              Victoza (Liraglutide)  after your              Mounjaro (Tirzepatide)  procedure.              Carols Rodriguez                It is important to monitor your blood sugar while doing the bowel preparation. On the day of your bowel   prep, when you are on a clear liquid diet, you may drink beverages with sugar as your source of glucose.   Be sure to mix the prep with water or sugar free liquid only. Call the healthcare provider who manages your   diabetes if you have questions.

## 2025-03-05 ENCOUNTER — TELEPHONE (OUTPATIENT)
Dept: ENDOCRINOLOGY | Facility: CLINIC | Age: 55
End: 2025-03-05
Payer: MEDICARE

## 2025-03-05 ENCOUNTER — PATIENT MESSAGE (OUTPATIENT)
Dept: FAMILY MEDICINE | Facility: CLINIC | Age: 55
End: 2025-03-05
Payer: MEDICARE

## 2025-03-05 NOTE — TELEPHONE ENCOUNTER
----- Message from Med Assistant Larry sent at 3/5/2025 12:25 PM CST -----  Who called:Pt What is the request in detail:TRULICITY 3 mg/0.5 mL pen injector isn't covered by her insurance anymore Can the clinic reply by [facility]? NoNo Would the patient rather a call back or a response via [facility]? Call backCallback Best call back number:Telephone Information:Mobile          [phone] Additional Information:Asking for insurance to be called regardingmedically necessary for medication  or a alternative that's covered . She's down to her last shot Thank you.

## 2025-03-06 ENCOUNTER — TELEPHONE (OUTPATIENT)
Dept: ENDOCRINOLOGY | Facility: CLINIC | Age: 55
End: 2025-03-06
Payer: MEDICARE

## 2025-03-06 NOTE — TELEPHONE ENCOUNTER
----- Message from Med Assistant Larry sent at 3/5/2025 12:25 PM CST -----  Who called:Pt What is the request in detail:TRULICITY 3 mg/0.5 mL pen injector isn't covered by her insurance anymore Can the clinic reply by [facility]? NoNo Would the patient rather a call back or a response via [facility]? Call backCallback Best call back number:Telephone Information:Mobile          [phone] Additional Information:Asking for insurance to be called regardingmedically necessary for medication  or a alternative that's covered . She's down to her last shot Thank you.

## 2025-03-06 NOTE — TELEPHONE ENCOUNTER
Lvm that provider is out of the office once he returns he'll let her know of an alternative since Trulicity still isn't covered.

## 2025-03-09 ENCOUNTER — TELEPHONE (OUTPATIENT)
Dept: ENDOCRINOLOGY | Facility: CLINIC | Age: 55
End: 2025-03-09
Payer: MEDICARE

## 2025-03-09 DIAGNOSIS — E11.65 TYPE 2 DIABETES MELLITUS WITH HYPERGLYCEMIA, WITHOUT LONG-TERM CURRENT USE OF INSULIN: Primary | ICD-10-CM

## 2025-03-09 RX ORDER — TIRZEPATIDE 5 MG/.5ML
5 INJECTION, SOLUTION SUBCUTANEOUS
Qty: 4 PEN | Refills: 6 | Status: SHIPPED | OUTPATIENT
Start: 2025-03-09

## 2025-03-10 NOTE — TELEPHONE ENCOUNTER
----- Message from Med Assistant Bernadette sent at 3/7/2025 10:20 AM CST -----  Regarding: FW: self  Pt requesting that alternative not be ozempic  ----- Message -----  From: Dario Shearer  Sent: 3/6/2025   4:29 PM CST  To: Angelita Sanz Staff  Subject: self                                             Type: Patient Call BackWho called:selfWhat is the request in detail:Patient states absolutely no Ozempic. Can the clinic reply by [facility]? NoWould the patient rather a call back or a response via [facility]? Call [facility] call back number:[phone] Information:Thank you.

## 2025-03-12 ENCOUNTER — TELEPHONE (OUTPATIENT)
Dept: ENDOCRINOLOGY | Facility: CLINIC | Age: 55
End: 2025-03-12
Payer: MEDICARE

## 2025-03-12 NOTE — TELEPHONE ENCOUNTER
----- Message from Brandon sent at 3/12/2025  9:21 AM CDT -----  Regarding: Maximiliano with [facility]                                                                      Provider InformationProvider: Maximiliano with [facility] Message: stated that they would like to know if the doctor prescribe Mounjaro for the patient. Please reach out to him as soon as possible. Contact Info: [phone] Additional Info:

## 2025-03-12 NOTE — TELEPHONE ENCOUNTER
Spoke to pt to ask if she was able to  her mounjaro from [facility]. Pt stated yes she did and thanked us. Understanding verbalized.

## 2025-03-17 DIAGNOSIS — E53.8 VITAMIN B12 DEFICIENCY: ICD-10-CM

## 2025-03-19 ENCOUNTER — TELEPHONE (OUTPATIENT)
Dept: HEMATOLOGY/ONCOLOGY | Facility: CLINIC | Age: 55
End: 2025-03-19
Payer: MEDICARE

## 2025-03-19 ENCOUNTER — PATIENT MESSAGE (OUTPATIENT)
Dept: ENDOSCOPY | Facility: HOSPITAL | Age: 55
End: 2025-03-19
Payer: MEDICARE

## 2025-03-19 DIAGNOSIS — E53.8 VITAMIN B12 DEFICIENCY: Primary | ICD-10-CM

## 2025-03-19 RX ORDER — CYANOCOBALAMIN 1000 UG/ML
INJECTION, SOLUTION INTRAMUSCULAR; SUBCUTANEOUS
Qty: 1 ML | Refills: 0 | Status: SHIPPED | OUTPATIENT
Start: 2025-03-19

## 2025-03-19 NOTE — TELEPHONE ENCOUNTER
I have entered labs on this patient  They need to be done about 5 days prior to her phy appt  It has been over 1 year since she has come in for a check up we need the labs to determine if she needs to continue with th injections Pls schedule her for the first est pt appt Dr Valencia has following the labs Tks

## 2025-03-20 ENCOUNTER — LAB VISIT (OUTPATIENT)
Dept: LAB | Facility: HOSPITAL | Age: 55
End: 2025-03-20
Attending: STUDENT IN AN ORGANIZED HEALTH CARE EDUCATION/TRAINING PROGRAM
Payer: MEDICARE

## 2025-03-20 DIAGNOSIS — E53.8 VITAMIN B12 DEFICIENCY: ICD-10-CM

## 2025-03-20 LAB
BASOPHILS # BLD AUTO: 0.07 K/UL (ref 0–0.2)
BASOPHILS NFR BLD: 0.8 % (ref 0–1.9)
DIFFERENTIAL METHOD BLD: ABNORMAL
EOSINOPHIL # BLD AUTO: 0.3 K/UL (ref 0–0.5)
EOSINOPHIL NFR BLD: 3 % (ref 0–8)
ERYTHROCYTE [DISTWIDTH] IN BLOOD BY AUTOMATED COUNT: 14.6 % (ref 11.5–14.5)
HCT VFR BLD AUTO: 50.8 % (ref 37–48.5)
HGB BLD-MCNC: 15.7 G/DL (ref 12–16)
IMM GRANULOCYTES # BLD AUTO: 0.02 K/UL (ref 0–0.04)
IMM GRANULOCYTES NFR BLD AUTO: 0.2 % (ref 0–0.5)
LYMPHOCYTES # BLD AUTO: 4.2 K/UL (ref 1–4.8)
LYMPHOCYTES NFR BLD: 47 % (ref 18–48)
MCH RBC QN AUTO: 26.5 PG (ref 27–31)
MCHC RBC AUTO-ENTMCNC: 30.9 G/DL (ref 32–36)
MCV RBC AUTO: 86 FL (ref 82–98)
MONOCYTES # BLD AUTO: 0.6 K/UL (ref 0.3–1)
MONOCYTES NFR BLD: 7.2 % (ref 4–15)
NEUTROPHILS # BLD AUTO: 3.7 K/UL (ref 1.8–7.7)
NEUTROPHILS NFR BLD: 41.8 % (ref 38–73)
NRBC BLD-RTO: 0 /100 WBC
PLATELET # BLD AUTO: 278 K/UL (ref 150–450)
PMV BLD AUTO: 9 FL (ref 9.2–12.9)
RBC # BLD AUTO: 5.92 M/UL (ref 4–5.4)
VIT B12 SERPL-MCNC: 1082 PG/ML (ref 210–950)
WBC # BLD AUTO: 8.87 K/UL (ref 3.9–12.7)

## 2025-03-20 PROCEDURE — 82607 VITAMIN B-12: CPT | Performed by: STUDENT IN AN ORGANIZED HEALTH CARE EDUCATION/TRAINING PROGRAM

## 2025-03-20 PROCEDURE — 83921 ORGANIC ACID SINGLE QUANT: CPT | Performed by: STUDENT IN AN ORGANIZED HEALTH CARE EDUCATION/TRAINING PROGRAM

## 2025-03-20 PROCEDURE — 36415 COLL VENOUS BLD VENIPUNCTURE: CPT | Performed by: STUDENT IN AN ORGANIZED HEALTH CARE EDUCATION/TRAINING PROGRAM

## 2025-03-20 PROCEDURE — 85025 COMPLETE CBC W/AUTO DIFF WBC: CPT | Performed by: STUDENT IN AN ORGANIZED HEALTH CARE EDUCATION/TRAINING PROGRAM

## 2025-03-20 RX ORDER — CYANOCOBALAMIN 1000 UG/ML
INJECTION, SOLUTION INTRAMUSCULAR; SUBCUTANEOUS
Qty: 3 ML | Refills: 3 | Status: SHIPPED | OUTPATIENT
Start: 2025-03-20

## 2025-03-24 LAB — METHYLMALONATE SERPL-SCNC: 0.2 UMOL/L

## 2025-03-27 ENCOUNTER — OFFICE VISIT (OUTPATIENT)
Dept: HEMATOLOGY/ONCOLOGY | Facility: CLINIC | Age: 55
End: 2025-03-27
Payer: MEDICARE

## 2025-03-27 VITALS
HEART RATE: 76 BPM | HEIGHT: 64 IN | WEIGHT: 258.38 LBS | DIASTOLIC BLOOD PRESSURE: 86 MMHG | SYSTOLIC BLOOD PRESSURE: 127 MMHG | BODY MASS INDEX: 44.11 KG/M2

## 2025-03-27 DIAGNOSIS — E53.8 VITAMIN B12 DEFICIENCY: Primary | ICD-10-CM

## 2025-03-27 DIAGNOSIS — D53.9 MACROCYTIC ANEMIA: ICD-10-CM

## 2025-03-27 DIAGNOSIS — E66.813 CLASS 3 SEVERE OBESITY DUE TO EXCESS CALORIES WITH SERIOUS COMORBIDITY AND BODY MASS INDEX (BMI) OF 40.0 TO 44.9 IN ADULT: ICD-10-CM

## 2025-03-27 DIAGNOSIS — E66.01 CLASS 3 SEVERE OBESITY DUE TO EXCESS CALORIES WITH SERIOUS COMORBIDITY AND BODY MASS INDEX (BMI) OF 40.0 TO 44.9 IN ADULT: ICD-10-CM

## 2025-03-27 DIAGNOSIS — F20.0 PARANOID SCHIZOPHRENIA: ICD-10-CM

## 2025-03-27 PROCEDURE — 99214 OFFICE O/P EST MOD 30 MIN: CPT | Mod: PBBFAC | Performed by: STUDENT IN AN ORGANIZED HEALTH CARE EDUCATION/TRAINING PROGRAM

## 2025-03-27 PROCEDURE — 99999 PR PBB SHADOW E&M-EST. PATIENT-LVL IV: CPT | Mod: PBBFAC,,, | Performed by: STUDENT IN AN ORGANIZED HEALTH CARE EDUCATION/TRAINING PROGRAM

## 2025-03-27 PROCEDURE — 99214 OFFICE O/P EST MOD 30 MIN: CPT | Mod: S$PBB,,, | Performed by: STUDENT IN AN ORGANIZED HEALTH CARE EDUCATION/TRAINING PROGRAM

## 2025-03-27 RX ORDER — CYANOCOBALAMIN 1000 UG/ML
1000 INJECTION, SOLUTION INTRAMUSCULAR; SUBCUTANEOUS
Qty: 3 ML | Refills: 3 | Status: SHIPPED | OUTPATIENT
Start: 2025-03-27

## 2025-03-27 NOTE — PROGRESS NOTES
PATIENT: Ava Driscoll  MRN: [id number]  DATE: 3/27/2025      Diagnosis:   1. Vitamin B12 deficiency    2. Class 3 severe obesity due to excess calories with serious comorbidity and body mass index (BMI) of 40.0 to 44.9 in adult    3. Paranoid schizophrenia    4. Macrocytic anemia            Chief Complaint: Follow-up and b12 deficiency      Oncologic History:   Oncologic History    Oncologic History      Oncologic Treatment      Pathology          Subjective:    Interval History: Ms. Driscoll returns for follow up.     54 year old woman with schizophrenia, hypertension, diabetes, hyperlipidemia, and obesity is here for follow up for b12 deficiency.      Pt doing well and currently on monthly b12 injections.  She feels well and denied any complaints  CBC stable and reviewed with pt.  B12 shots refilled.    She is alone at this visit and doing well with B12 shots and will continue, has colonoscopy pending next month.  No issues at time of exam.    Past Medical History:   Past Medical History:   Diagnosis Date    Behavioral problem     Colon polyps     Diabetes mellitus     History of psychiatric care     History of psychiatric hospitalization     Hx of psychiatric care     Loud snoring     Obese     Psychiatric problem     Retinopathy due to secondary diabetes     Schizophrenia     Severe anemia 2021    Therapy        Past Surgical HIstory:   Past Surgical History:   Procedure Laterality Date     SECTION      COLONOSCOPY N/A 2020    Procedure: COLONOSCOPY;  Surgeon: Edvin Zuniga II, MD;  Location: [facility];  Service: Endoscopy;  Laterality: N/A;    HYSTERECTOMY         Family History:   Family History   Problem Relation Name Age of Onset    Arthritis Mother      Diabetes type II Father      Breast cancer Maternal Aunt  80        unilat    Ovarian cancer Neg Hx         Social History:  reports that she has quit smoking. Her smoking use included cigarettes. She has never used smokeless tobacco.  She reports that she does not drink alcohol and does not use drugs.    Allergies:  Review of patient's allergies indicates:   Allergen Reactions    Ace inhibitors Other (See Comments)     Cough       Medications:  Current Outpatient Medications   Medication Sig Dispense Refill    acetaminophen (TYLENOL) 500 MG tablet Take 1 tablet (500 mg total) by mouth every 6 (six) hours as needed for Pain. 30 tablet 0    blood sugar diagnostic Strp Dispense: Test strip to check glucose 2 times a day, ICD-10: E11.65, Okay with any device that is covered by insurance. Or advised she need to pay out of pocket 200 each 5    blood-glucose meter kit Dispense: 1 glucometer, use to check glucose 2 times a day, ICD-10: E11.65, Okay with any device that is covered by insurance. Or advised she need to pay out of pocket 1 each 0    cyanocobalamin 1,000 mcg/mL injection Inject 1 ml IM once monthly 1 mL 0    empagliflozin (JARDIANCE) 25 mg tablet Take 1 tablet (25 mg total) by mouth once daily. 90 tablet 3    ergocalciferol (ERGOCALCIFEROL) 50,000 unit Cap Take 1 capsule (50,000 Units total) by mouth every 7 days. 12 capsule 3    fluticasone propionate (FLONASE) 50 mcg/actuation nasal spray 2 sprays (100 mcg total) by Each Nostril route 2 (two) times a day. 16 g 2    INVEGA SUSTENNA 156 mg/mL Syrg injection       lancets Misc Dispense: Lancets use to check glucose  2 times a day, ICD-10: E11.65 200 each 5    lancing device Misc One device, used to check blood glucose, ICD-10: E11.65 1 each 0    losartan (COZAAR) 25 MG tablet Take 1 tablet (25 mg total) by mouth once daily. 90 tablet 1    metFORMIN (GLUCOPHAGE) 1000 MG tablet Take 1 tablet (1,000 mg total) by mouth daily with breakfast. 90 tablet 3    MOUNJARO 5 mg/0.5 mL PnIj Inject 5 mg into the skin every 7 days. 4 Pen 6    pravastatin (PRAVACHOL) 40 MG tablet Take 1 tablet (40 mg total) by mouth once daily. 90 tablet 3    tobramycin sulfate 0.3% (TOBREX) 0.3 % ophthalmic solution Place 1  "drop into both eyes 2 (two) times a day. 5 mL 0    triamcinolone acetonide 0.1% (KENALOG) 0.1 % cream Apply topically 2 (two) times daily. 453 g 1    cyanocobalamin 1,000 mcg/mL injection Inject 1 mL (1,000 mcg total) into the muscle every 30 days. 3 mL 3     No current facility-administered medications for this visit.       Review of Systems   Constitutional:  Negative for activity change, appetite change, chills, diaphoresis, fatigue, fever and unexpected weight change.   HENT:  Negative for nosebleeds and trouble swallowing.    Eyes:  Negative for visual disturbance.   Respiratory:  Negative for cough, chest tightness, shortness of breath and wheezing.    Cardiovascular:  Negative for chest pain and leg swelling.   Gastrointestinal:  Negative for abdominal distention, abdominal pain, blood in stool, constipation, diarrhea, nausea and vomiting.   Endocrine: Negative for cold intolerance and heat intolerance.   Genitourinary:  Negative for difficulty urinating and dysuria.   Musculoskeletal:  Negative for arthralgias and back pain.   Skin:  Negative for color change.   Neurological:  Negative for dizziness, weakness, light-headedness, numbness and headaches.   Hematological:  Negative for adenopathy. Does not bruise/bleed easily.   Psychiatric/Behavioral:  Negative for confusion.      ECOG Performance Status:     ECOG SCORE    0 - Fully active-able to carry on all pre-disease performance without restriction         Objective:      Vitals:   Vitals:    03/27/25 0830   BP: 127/86   BP Location: Left arm   Patient Position: Sitting   Pulse: 76   SpO2: (P) 100%   Weight: 117.2 kg (258 lb 6.1 oz)   Height: 5' 4" (1.626 m)           BMI: Body mass index is 44.35 kg/m².    Physical Exam  Constitutional:       General: She is not in acute distress.     Appearance: She is obese. She is not ill-appearing.   HENT:      Head: Normocephalic and atraumatic.      Mouth/Throat:      Pharynx: No oropharyngeal exudate or posterior " oropharyngeal erythema.   Eyes:      General: No scleral icterus.     Extraocular Movements: Extraocular movements intact.      Conjunctiva/sclera: Conjunctivae normal.      Pupils: Pupils are equal, round, and reactive to light.   Cardiovascular:      Rate and Rhythm: Normal rate and regular rhythm.      Heart sounds: No murmur heard.    No friction rub. No gallop.   Pulmonary:      Effort: Pulmonary effort is normal. No respiratory distress.      Breath sounds: No stridor. No wheezing, rhonchi or rales.   Abdominal:      General: Bowel sounds are normal. There is no distension.      Palpations: Abdomen is soft. There is no mass.      Tenderness: There is no abdominal tenderness. There is no guarding or rebound.   Musculoskeletal:         General: Normal range of motion.      Cervical back: Normal range of motion and neck supple.      Right lower leg: No edema.      Left lower leg: No edema.   Skin:     General: Skin is warm and dry.   Neurological:      General: No focal deficit present.      Mental Status: She is alert.       Laboratory Data:   No results found for this or any previous visit (from the past week).          Imaging:       Assessment/Plan           Vitamin B12 deficiency - Primary  -Cytopenias completed resolved. Intrinsic factor antibody positive.  -Counseled patient that she needs to be on vitamin B12 supplementation for life  -Discussed with her oral vs IM supplementation. She prefers to continue IM.  She can continue monthly injections.  -B12 shots refilled today, CBC stable  -labs and follow up in 1 year.      Schizophrenia  -Stable  -Per [facility]      HTN  -Stable  -Per PCP      DM  -Stable  -Per PCP    Orders Placed This Encounter   Procedures    CBC Auto Differential    Ferritin    Iron and TIBC    MMA    Methylmalonic Acid, Serum               Remington Valencia MD  Hematology Oncology

## 2025-03-31 ENCOUNTER — PATIENT MESSAGE (OUTPATIENT)
Dept: ADMINISTRATIVE | Facility: HOSPITAL | Age: 55
End: 2025-03-31
Payer: MEDICARE

## 2025-04-02 NOTE — PROGRESS NOTES
Spoke with patient regarding time of arrival, parking, registration location, ride verification, NPO status, prep instructions, stopped GLP-1, 7 days prior to procedure, and no anti-coagulants use. Patient and verbalizes understanding.

## 2025-04-03 ENCOUNTER — PATIENT MESSAGE (OUTPATIENT)
Dept: FAMILY MEDICINE | Facility: CLINIC | Age: 55
End: 2025-04-03
Payer: MEDICARE

## 2025-04-04 VITALS — SYSTOLIC BLOOD PRESSURE: 118 MMHG | DIASTOLIC BLOOD PRESSURE: 77 MMHG

## 2025-04-07 ENCOUNTER — ANESTHESIA EVENT (OUTPATIENT)
Dept: ENDOSCOPY | Facility: HOSPITAL | Age: 55
End: 2025-04-07
Payer: MEDICARE

## 2025-04-08 ENCOUNTER — RESULTS FOLLOW-UP (OUTPATIENT)
Dept: FAMILY MEDICINE | Facility: CLINIC | Age: 55
End: 2025-04-08

## 2025-04-08 ENCOUNTER — ANESTHESIA (OUTPATIENT)
Dept: ENDOSCOPY | Facility: HOSPITAL | Age: 55
End: 2025-04-08
Payer: MEDICARE

## 2025-04-08 ENCOUNTER — HOSPITAL ENCOUNTER (OUTPATIENT)
Facility: HOSPITAL | Age: 55
Discharge: HOME | End: 2025-04-08
Attending: INTERNAL MEDICINE | Admitting: INTERNAL MEDICINE
Payer: MEDICARE

## 2025-04-08 VITALS
RESPIRATION RATE: 15 BRPM | HEART RATE: 71 BPM | SYSTOLIC BLOOD PRESSURE: 121 MMHG | OXYGEN SATURATION: 100 % | DIASTOLIC BLOOD PRESSURE: 75 MMHG | TEMPERATURE: 97 F

## 2025-04-08 DIAGNOSIS — Z86.0100 PERSONAL HISTORY OF COLON POLYPS, UNSPECIFIED: Primary | ICD-10-CM

## 2025-04-08 LAB — POCT GLUCOSE: 99 MG/DL (ref 70–110)

## 2025-04-08 PROCEDURE — 37000008 HC ANESTHESIA 1ST 15 MINUTES: Performed by: INTERNAL MEDICINE

## 2025-04-08 PROCEDURE — G0105 COLORECTAL SCRN; HI RISK IND: HCPCS | Mod: ,,, | Performed by: INTERNAL MEDICINE

## 2025-04-08 PROCEDURE — 25000003 PHARM REV CODE 250: Performed by: STUDENT IN AN ORGANIZED HEALTH CARE EDUCATION/TRAINING PROGRAM

## 2025-04-08 PROCEDURE — G0105 COLORECTAL SCRN; HI RISK IND: HCPCS | Performed by: INTERNAL MEDICINE

## 2025-04-08 PROCEDURE — 63600175 PHARM REV CODE 636 W HCPCS: Performed by: STUDENT IN AN ORGANIZED HEALTH CARE EDUCATION/TRAINING PROGRAM

## 2025-04-08 PROCEDURE — 37000009 HC ANESTHESIA EA ADD 15 MINS: Performed by: INTERNAL MEDICINE

## 2025-04-08 RX ORDER — LIDOCAINE HYDROCHLORIDE 20 MG/ML
INJECTION, SOLUTION EPIDURAL; INFILTRATION; INTRACAUDAL; PERINEURAL
Status: DISCONTINUED
Start: 2025-04-08 | End: 2025-04-08 | Stop reason: HOSPADM

## 2025-04-08 RX ORDER — PROPOFOL 10 MG/ML
VIAL (ML) INTRAVENOUS
Status: DISCONTINUED | OUTPATIENT
Start: 2025-04-08 | End: 2025-04-08

## 2025-04-08 RX ORDER — LIDOCAINE HYDROCHLORIDE 10 MG/ML
1 INJECTION, SOLUTION EPIDURAL; INFILTRATION; INTRACAUDAL; PERINEURAL ONCE
Status: DISCONTINUED | OUTPATIENT
Start: 2025-04-08 | End: 2025-04-08 | Stop reason: HOSPADM

## 2025-04-08 RX ORDER — PROPOFOL 10 MG/ML
VIAL (ML) INTRAVENOUS
Status: DISCONTINUED
Start: 2025-04-08 | End: 2025-04-08 | Stop reason: HOSPADM

## 2025-04-08 RX ORDER — SODIUM CHLORIDE 9 MG/ML
INJECTION, SOLUTION INTRAVENOUS CONTINUOUS
Status: DISCONTINUED | OUTPATIENT
Start: 2025-04-08 | End: 2025-04-08 | Stop reason: HOSPADM

## 2025-04-08 RX ORDER — LIDOCAINE HYDROCHLORIDE 20 MG/ML
INJECTION INTRAVENOUS
Status: DISCONTINUED | OUTPATIENT
Start: 2025-04-08 | End: 2025-04-08

## 2025-04-08 RX ADMIN — LIDOCAINE HYDROCHLORIDE 100 MG: 20 INJECTION, SOLUTION INTRAVENOUS at 11:04

## 2025-04-08 RX ADMIN — SODIUM CHLORIDE: 0.9 INJECTION, SOLUTION INTRAVENOUS at 11:04

## 2025-04-08 RX ADMIN — PROPOFOL 30 MG: 10 INJECTION, EMULSION INTRAVENOUS at 11:04

## 2025-04-08 RX ADMIN — PROPOFOL 20 MG: 10 INJECTION, EMULSION INTRAVENOUS at 11:04

## 2025-04-08 RX ADMIN — PROPOFOL 40 MG: 10 INJECTION, EMULSION INTRAVENOUS at 11:04

## 2025-04-08 RX ADMIN — PROPOFOL 60 MG: 10 INJECTION, EMULSION INTRAVENOUS at 11:04

## 2025-04-08 NOTE — PLAN OF CARE
Procedure and recovery complete. Patient awake and alert. MD at bedside, procedure findings and suggestions discussed. Discharge instructions given, patient verbalized understanding of instructions. Gait steady able to ambulate without assistance. Patient walked out accompanied by mother.

## 2025-04-08 NOTE — PROVATION PATIENT INSTRUCTIONS
Discharge Summary/Instructions after an Endoscopic Procedure  Patient Name: Ava Driscoll  Patient MRN: [id number]  Patient YOB: 1970  Tuesday, April 8, 2025  Ele Cornelius MD  Dear patient,  As a result of recent federal legislation (The Federal Cures Act), you may   receive lab or pathology results from your procedure in your [facility]   account before your physician is able to contact you. Your physician or   their representative will relay the results to you with their   recommendations at their soonest availability.  Thank you,  RESTRICTIONS:  During your procedure today, you received medications for sedation.  These   medications may affect your judgment, balance and coordination.  Therefore,   for 24 hours, you have the following restrictions:   - DO NOT drive a car, operate machinery, make legal/financial decisions,   sign important papers or drink alcohol.    ACTIVITY:  Today: no heavy lifting, straining or running due to procedural   sedation/anesthesia.  The following day: return to full activity including work.  DIET:  Eat and drink normally unless instructed otherwise.     TREATMENT FOR COMMON SIDE EFFECTS:  - Mild abdominal pain, nausea, belching, bloating or excessive gas:  rest,   eat lightly and use a heating pad.  - Sore Throat: treat with throat lozenges and/or gargle with warm salt   water.  - Because air was used during the procedure, expelling large amounts of air   from your rectum or belching is normal.  - If a bowel prep was taken, you may not have a bowel movement for 1-3 days.    This is normal.  SYMPTOMS TO WATCH FOR AND REPORT TO YOUR PHYSICIAN:  1. Abdominal pain or bloating, other than gas cramps.  2. Chest pain.  3. Back pain.  4. Signs of infection such as: chills or fever occurring within 24 hours   after the procedure.  5. Rectal bleeding, which would show as bright red, maroon, or black stools.   (A tablespoon of blood from the rectum is not serious, especially if    hemorrhoids are present.)  6. Vomiting.  7. Weakness or dizziness.  GO DIRECTLY TO THE NEAREST EMERGENCY ROOM IF YOU HAVE ANY OF THE FOLLOWING:      Difficulty breathing              Chills and/or fever over 101 F   Persistent vomiting and/or vomiting blood   Severe abdominal pain   Severe chest pain   Black, tarry stools   Bleeding- more than one tablespoon   Any other symptom or condition that you feel may need urgent attention  Your doctor recommends these additional instructions:  If any biopsies were taken, your doctors clinic will contact you in 1 to 2   weeks with any results.  - Discharge patient to home.   - Resume previous diet.   - Continue present medications.   - Await pathology results.   - Repeat colonoscopy in 5 years for surveillance.   - Patient has a contact number available for emergencies.  The signs and   symptoms of potential delayed complications were discussed with the   patient.  Return to normal activities tomorrow.  Written discharge   instructions were provided to the patient.  For questions, problems or results please call your physician - Ele Cornelius MD at Work:  [phone].  [facility] can be reached at [phone]     IF A COMPLICATION OR EMERGENCY SITUATION ARISES AND YOU ARE UNABLE TO REACH   YOUR PHYSICIAN - GO DIRECTLY TO THE EMERGENCY ROOM.  Ele Cornelius MD  4/8/2025 12:15:24 PM  This report has been verified and signed electronically.  Dear patient,  As a result of recent federal legislation (The Federal Cures Act), you may   receive lab or pathology results from your procedure in your [facility]   account before your physician is able to contact you. Your physician or   their representative will relay the results to you with their   recommendations at their soonest availability.  Thank you,  PROVATION

## 2025-04-08 NOTE — ANESTHESIA PREPROCEDURE EVALUATION
04/08/2025  Ava Driscoll is a 54 y.o., female.      Pre-op Assessment    I have reviewed the Patient Summary Reports.     I have reviewed the Nursing Notes. I have reviewed the NPO Status.   I have reviewed the Medications.     Review of Systems  Anesthesia Hx:             Denies Family Hx of Anesthesia complications.    Denies Personal Hx of Anesthesia complications.                    Social:  Former Smoker, No Alcohol Use       Cardiovascular:     Hypertension           hyperlipidemia                               Hepatic/GI:         Taking GLP-1 Agonists Instructed to Hold for 7 Days No Reported GI Symptoms          Endocrine:  Diabetes, type 2         Morbid Obesity / BMI > 40  Psych:  Psychiatric History  depression                Physical Exam  General: Oriented, Alert and Cooperative    Airway:  Mallampati: II / I  Mouth Opening: Normal  TM Distance: Normal  Tongue: Normal  Neck ROM: Normal ROM    Dental:        Anesthesia Plan  Type of Anesthesia, risks & benefits discussed:    Anesthesia Type: Gen Natural Airway  Intra-op Monitoring Plan: Standard ASA Monitors  Induction:  IV  Informed Consent: Informed consent signed with the Patient and all parties understand the risks and agree with anesthesia plan.  All questions answered. Patient consented to blood products? No  ASA Score: 3    Ready For Surgery From Anesthesia Perspective.     .

## 2025-04-08 NOTE — H&P
Short Stay Endoscopy History and Physical    PCP - Kayla Varela MD    Procedure - Colonoscopy  ASA - per anesthesia  Mallampati - per anesthesia  Plan of anesthesia - MAC    HPI:  This is a 54 y.o. female here for evaluation of : personal history of colon polyps    ROS:  Constitutional: No fevers, chills  CV: No chest pain  Pulm: No cough  Ophtho: No vision changes  GI: see HPI  Derm: No rash    Medical History:  has a past medical history of Behavioral problem, Colon polyps, Diabetes mellitus, History of psychiatric care, History of psychiatric hospitalization, psychiatric care, Loud snoring, Obese, Psychiatric problem, Retinopathy due to secondary diabetes, Schizophrenia, Severe anemia (2021), and Therapy.    Surgical History:  has a past surgical history that includes Hysterectomy;  section; and Colonoscopy (N/A, 2020).    Family History: family history includes Arthritis in her mother; Breast cancer (age of onset: 80) in her maternal aunt; Diabetes type II in her father.. Otherwise no colon cancer, inflammatory bowel disease, or GI malignancies.    Social History:  reports that she has quit smoking. Her smoking use included cigarettes. She has never used smokeless tobacco. She reports that she does not drink alcohol and does not use drugs.    Review of patient's allergies indicates:   Allergen Reactions    Ace inhibitors Other (See Comments)     Cough       Medications:   Prescriptions Prior to Admission[1]      Vital Signs: There were no vitals filed for this visit.    General Appearance: Well appearing in no acute distress  Eyes:    No scleral icterus  ENT: atraumatic  Abdomen: Soft, nondistended  Extremities: no tenderness  Skin: normal color    Labs:  Lab Results   Component Value Date    WBC 8.87 2025    HGB 15.7 2025    HCT 50.8 (H) 2025     2025    CHOL 156 05/15/2024    TRIG 97 05/15/2024    HDL 43 05/15/2024    ALT 12 2023    AST 15  09/26/2023     02/05/2025    K 4.8 02/05/2025     02/05/2025    CREATININE 0.9 02/05/2025    BUN 16 02/05/2025    CO2 28 02/05/2025    TSH 2.927 12/18/2021    INR 1.0 12/18/2021    HGBA1C 6.0 (H) 02/05/2025       I have explained the risks and benefits of endoscopy procedures to the patient/their POA including but not limited to bleeding, perforation, infection, and death.  The patient/their POA was asked if they understand and allowed to ask any further questions to their satisfaction.    Proceed with colonoscopy    Jim Diana MD         [1]   Medications Prior to Admission   Medication Sig Dispense Refill Last Dose/Taking    acetaminophen (TYLENOL) 500 MG tablet Take 1 tablet (500 mg total) by mouth every 6 (six) hours as needed for Pain. 30 tablet 0     blood sugar diagnostic Strp Dispense: Test strip to check glucose 2 times a day, ICD-10: E11.65, Okay with any device that is covered by insurance. Or advised she need to pay out of pocket 200 each 5     blood-glucose meter kit Dispense: 1 glucometer, use to check glucose 2 times a day, ICD-10: E11.65, Okay with any device that is covered by insurance. Or advised she need to pay out of pocket 1 each 0     cyanocobalamin 1,000 mcg/mL injection Inject 1 ml IM once monthly 1 mL 0     cyanocobalamin 1,000 mcg/mL injection Inject 1 mL (1,000 mcg total) into the muscle every 30 days. 3 mL 3     empagliflozin (JARDIANCE) 25 mg tablet Take 1 tablet (25 mg total) by mouth once daily. 90 tablet 3     ergocalciferol (ERGOCALCIFEROL) 50,000 unit Cap Take 1 capsule (50,000 Units total) by mouth every 7 days. 12 capsule 3     fluticasone propionate (FLONASE) 50 mcg/actuation nasal spray 2 sprays (100 mcg total) by Each Nostril route 2 (two) times a day. 16 g 2     INVEGA SUSTENNA 156 mg/mL Syrg injection        lancets Misc Dispense: Lancets use to check glucose  2 times a day, ICD-10: E11.65 200 each 5     lancing device Misc One device, used to check blood  glucose, ICD-10: E11.65 1 each 0     losartan (COZAAR) 25 MG tablet Take 1 tablet (25 mg total) by mouth once daily. 90 tablet 1     metFORMIN (GLUCOPHAGE) 1000 MG tablet Take 1 tablet (1,000 mg total) by mouth daily with breakfast. 90 tablet 3     MOUNJARO 5 mg/0.5 mL PnIj Inject 5 mg into the skin every 7 days. 4 Pen 6     pravastatin (PRAVACHOL) 40 MG tablet Take 1 tablet (40 mg total) by mouth once daily. 90 tablet 3     tobramycin sulfate 0.3% (TOBREX) 0.3 % ophthalmic solution Place 1 drop into both eyes 2 (two) times a day. 5 mL 0     triamcinolone acetonide 0.1% (KENALOG) 0.1 % cream Apply topically 2 (two) times daily. 453 g 1

## 2025-04-08 NOTE — TRANSFER OF CARE
Anesthesia Transfer of Care Note    Patient: Ava Driscoll    Procedure(s) Performed: Procedure(s) (LRB):  COLONOSCOPY (N/A)    Patient location: GI    Anesthesia Type: general    Transport from OR: Transported from OR on room air with adequate spontaneous ventilation    Post pain: adequate analgesia    Post assessment: no apparent anesthetic complications and tolerated procedure well    Post vital signs: stable    Level of consciousness: lethargic and responds to stimulation    Nausea/Vomiting: no nausea/vomiting    Complications: none    Transfer of care protocol was followed      Last vitals: Visit Vitals  /62 (BP Location: Left arm, Patient Position: Lying)   Pulse 92   Temp 36.2 °C (97.1 °F) (Temporal)   Resp 16   SpO2 97%   Breastfeeding No

## 2025-04-08 NOTE — ANESTHESIA POSTPROCEDURE EVALUATION
Anesthesia Post Evaluation    Patient: Ava Driscoll    Procedure(s) Performed: Procedure(s) (LRB):  COLONOSCOPY (N/A)    Final Anesthesia Type: general      Patient location during evaluation: GI PACU  Patient participation: Yes- Able to Participate  Level of consciousness: awake and alert  Post-procedure vital signs: reviewed and stable  Airway patency: patent    PONV status at discharge: No PONV  Anesthetic complications: no      Cardiovascular status: blood pressure returned to baseline and hemodynamically stable  Respiratory status: unassisted, spontaneous ventilation and room air  Hydration status: euvolemic  Follow-up not needed.              Vitals Value Taken Time   /76 04/08/25 11:59   Temp 36.2 °C (97.1 °F) 04/08/25 11:44   Pulse 75 04/08/25 11:59   Resp 20 04/08/25 11:59   SpO2 100 % 04/08/25 11:59         No case tracking events are documented in the log.      Pain/Coco Score: Coco Score: 10 (4/8/2025 11:59 AM)

## 2025-04-14 ENCOUNTER — OFFICE VISIT (OUTPATIENT)
Dept: PODIATRY | Facility: CLINIC | Age: 55
End: 2025-04-14
Payer: MEDICARE

## 2025-04-14 VITALS — WEIGHT: 258.38 LBS | BODY MASS INDEX: 44.11 KG/M2 | HEIGHT: 64 IN

## 2025-04-14 DIAGNOSIS — E11.9 CONTROLLED TYPE 2 DIABETES MELLITUS WITHOUT COMPLICATION, WITHOUT LONG-TERM CURRENT USE OF INSULIN: Primary | ICD-10-CM

## 2025-04-14 DIAGNOSIS — L85.3 XEROSIS OF SKIN: ICD-10-CM

## 2025-04-14 PROCEDURE — 99999 PR PBB SHADOW E&M-EST. PATIENT-LVL III: CPT | Mod: PBBFAC,,, | Performed by: PODIATRIST

## 2025-04-14 PROCEDURE — 99213 OFFICE O/P EST LOW 20 MIN: CPT | Mod: S$PBB,,, | Performed by: PODIATRIST

## 2025-04-14 PROCEDURE — 99213 OFFICE O/P EST LOW 20 MIN: CPT | Mod: PBBFAC,PO | Performed by: PODIATRIST

## 2025-04-14 RX ORDER — AMMONIUM LACTATE 12 G/100G
1 CREAM TOPICAL DAILY
Qty: 140 G | Refills: 5 | Status: SHIPPED | OUTPATIENT
Start: 2025-04-14

## 2025-04-14 NOTE — PROGRESS NOTES
Subjective:      Patient ID: Ava Driscoll is a 54 y.o. female.    Chief Complaint: Diabetic Foot Exam (Endo Goldstein), Diabetes Mellitus, Nail Care, and Nail Problem (Left great toenail)    Ava is a 54 y.o. female who presents to the clinic upon referral from Dr. Pat carreon. provider found  for evaluation and treatment of diabetic feet. Ava has a past medical history of Behavioral problem, Colon polyps, Diabetes mellitus, History of psychiatric care, History of psychiatric hospitalization, psychiatric care, Loud snoring, Obese, Psychiatric problem, Retinopathy due to secondary diabetes, Schizophrenia, Severe anemia (12/16/2021), and Therapy. Presents for diabetic foot risk assessment. Reports elongated nails that are difficult to trim.   PCP: Kayla Varela MD    Date Last Seen by PCP: per above    Current shoe gear: Tennis shoes    Hemoglobin A1C   Date Value Ref Range Status   02/05/2025 6.0 (H) 4.0 - 5.6 % Final     Comment:     ADA Screening Guidelines:  5.7-6.4%  Consistent with prediabetes  >or=6.5%  Consistent with diabetes    High levels of fetal hemoglobin interfere with the HbA1C  assay. Heterozygous hemoglobin variants (HbS, HgC, etc)do  not significantly interfere with this assay.   However, presence of multiple variants may affect accuracy.     04/10/2024 6.0 (H) 4.0 - 5.6 % Final     Comment:     ADA Screening Guidelines:  5.7-6.4%  Consistent with prediabetes  >or=6.5%  Consistent with diabetes    High levels of fetal hemoglobin interfere with the HbA1C  assay. Heterozygous hemoglobin variants (HbS, HgC, etc)do  not significantly interfere with this assay.   However, presence of multiple variants may affect accuracy.     02/09/2024 5.9 (H) 4.0 - 5.6 % Final     Comment:     ADA Screening Guidelines:  5.7-6.4%  Consistent with prediabetes  >or=6.5%  Consistent with diabetes    High levels of fetal hemoglobin interfere with the HbA1C  assay. Heterozygous hemoglobin variants (HbS, HgC, etc)do  not  significantly interfere with this assay.   However, presence of multiple variants may affect accuracy.           Review of Systems   Constitutional: Negative for chills.   Cardiovascular:  Negative for chest pain and claudication.   Respiratory:  Negative for cough.    Skin:  Positive for color change, dry skin and nail changes.   Musculoskeletal:  Positive for joint pain.   Gastrointestinal:  Negative for nausea.   Neurological:  Positive for paresthesias. Negative for numbness.   Psychiatric/Behavioral:  The patient is not nervous/anxious.            Objective:      Physical Exam  Constitutional:       Appearance: She is well-developed.      Comments: Oriented to time, place, and person.   Cardiovascular:      Comments: DP and PT pulses are palpable bilaterally. 3 sec capillary refill time and toes and feet are warm to touch proximally .  There is  hair growth on the feet and toes b/l. There is no edema b/l. No spider veins or varicosities present b/l.     Musculoskeletal:      Comments: Equinus noted b/l ankles with < 10 deg DF noted. MMT 5/5 in DF/PF/Inv/Ev resistance with no reproduction of pain in any direction. Passive range of motion of ankle and pedal joints is painless b/l.     Feet:      Right foot:      Skin integrity: No callus or dry skin.      Left foot:      Skin integrity: No callus or dry skin.   Lymphadenopathy:      Comments: Negative lymphadenopathy bilateral popliteal fossa and tarsal tunnel.   Skin:     Comments: Toenails 1-5 bilaterally are elongated by 2-3 mm, thickened by 2-3 mm, discolored/yellowed, dystrophic, brittle with subungual debris.    Xerosis of skin B/L    Neurological:      Mental Status: She is alert.      Comments: Light touch, proprioception, and sharp/dull sensation are all intact bilaterally. Protective threshold with the [place]-Wienstein monofilament is intact bilaterally.    Psychiatric:         Behavior: Behavior is cooperative.             Assessment:       Encounter  Diagnoses   Name Primary?    Controlled type 2 diabetes mellitus without complication, without long-term current use of insulin Yes    Xerosis of skin            Plan:       Ava was seen today for diabetic foot exam, diabetes mellitus, nail care and nail problem.    Diagnoses and all orders for this visit:    Controlled type 2 diabetes mellitus without complication, without long-term current use of insulin    Xerosis of skin    Other orders  -     ammonium lactate 12 % Crea; Apply 1 application  topically once daily.        I counseled the patient on her conditions, their implications and medical management.      - Shoe inspection. Diabetic Foot Education. Patient reminded of the importance of good nutrition and blood sugar control to help prevent podiatric complications of diabetes. Patient instructed on proper foot hygeine. We discussed wearing proper shoe gear, daily foot inspections, never walking without protective shoe gear, never putting sharp instruments to feet, routine podiatric nail visits every 12 months.     Nails 1-5 trimmed B/L    Rx. Amlactin      RTC PRN

## 2025-04-23 ENCOUNTER — TELEPHONE (OUTPATIENT)
Dept: PODIATRY | Facility: CLINIC | Age: 55
End: 2025-04-23
Payer: MEDICARE

## 2025-04-23 NOTE — TELEPHONE ENCOUNTER
----- Message from Med Assistant Reagan sent at 4/23/2025 11:39 AM CDT -----  Type: Patient Call BackWho called: SelfWhat is the request in detail: is asking for a call regarding the name of a fungus medication she was supposed to get .. Can the clinic reply by [facility]?NOWould the patient rather a call back or a response via [facility]? Yes, call Best call back number: [phone] (home)

## 2025-04-23 NOTE — TELEPHONE ENCOUNTER
Returned pt call, pt states she was told by provider during last office visit a name of a otc fungus medication . Pt states she forgot name of medication and was calling for provider to provide the name again.

## 2025-04-23 NOTE — TELEPHONE ENCOUNTER
Placed call to provide pt with the name of fungus otc she requested from message below. Pt verbalized understanding.

## 2025-05-21 DIAGNOSIS — E11.9 TYPE 2 DIABETES MELLITUS WITHOUT COMPLICATION: ICD-10-CM

## 2025-05-23 ENCOUNTER — TELEPHONE (OUTPATIENT)
Dept: FAMILY MEDICINE | Facility: CLINIC | Age: 55
End: 2025-05-23
Payer: MEDICARE

## 2025-05-23 NOTE — TELEPHONE ENCOUNTER
----- Message from Christy sent at 5/23/2025  2:00 PM CDT -----  Type: Patient Call Back Who called:Self  What is the request in detail:Pt asking can she come in at an early time for her upcoming appt  05/28/2025 11:am  Can the clinic reply by [facility]? No Would the patient rather a call back or a response via [facility]? Call back Best call back number:.[phone]  Additional Information: Thank you.

## 2025-06-11 DIAGNOSIS — E11.65 TYPE 2 DIABETES MELLITUS WITH HYPERGLYCEMIA, WITHOUT LONG-TERM CURRENT USE OF INSULIN: ICD-10-CM

## 2025-06-12 RX ORDER — METFORMIN HYDROCHLORIDE 1000 MG/1
1000 TABLET ORAL
Qty: 90 TABLET | Refills: 3 | Status: SHIPPED | OUTPATIENT
Start: 2025-06-12

## 2025-06-15 DIAGNOSIS — E55.9 VITAMIN D DEFICIENCY: ICD-10-CM

## 2025-06-17 RX ORDER — ERGOCALCIFEROL 1.25 MG/1
50000 CAPSULE ORAL
Qty: 13 CAPSULE | Refills: 3 | Status: SHIPPED | OUTPATIENT
Start: 2025-06-17

## 2025-06-18 ENCOUNTER — LAB VISIT (OUTPATIENT)
Dept: LAB | Facility: HOSPITAL | Age: 55
End: 2025-06-18
Attending: FAMILY MEDICINE
Payer: MEDICARE

## 2025-06-18 ENCOUNTER — OFFICE VISIT (OUTPATIENT)
Dept: FAMILY MEDICINE | Facility: CLINIC | Age: 55
End: 2025-06-18
Payer: MEDICARE

## 2025-06-18 ENCOUNTER — PATIENT MESSAGE (OUTPATIENT)
Dept: FAMILY MEDICINE | Facility: CLINIC | Age: 55
End: 2025-06-18

## 2025-06-18 ENCOUNTER — PATIENT OUTREACH (OUTPATIENT)
Dept: ADMINISTRATIVE | Facility: HOSPITAL | Age: 55
End: 2025-06-18
Payer: MEDICARE

## 2025-06-18 VITALS
TEMPERATURE: 99 F | SYSTOLIC BLOOD PRESSURE: 116 MMHG | HEIGHT: 64 IN | HEART RATE: 96 BPM | WEIGHT: 259.5 LBS | OXYGEN SATURATION: 98 % | BODY MASS INDEX: 44.3 KG/M2 | DIASTOLIC BLOOD PRESSURE: 84 MMHG

## 2025-06-18 DIAGNOSIS — E11.69 HYPERLIPIDEMIA ASSOCIATED WITH TYPE 2 DIABETES MELLITUS: ICD-10-CM

## 2025-06-18 DIAGNOSIS — E11.65 TYPE 2 DIABETES MELLITUS WITH HYPERGLYCEMIA, WITHOUT LONG-TERM CURRENT USE OF INSULIN: ICD-10-CM

## 2025-06-18 DIAGNOSIS — E11.65 TYPE 2 DIABETES MELLITUS WITH HYPERGLYCEMIA, WITHOUT LONG-TERM CURRENT USE OF INSULIN: Primary | ICD-10-CM

## 2025-06-18 DIAGNOSIS — E78.5 HYPERLIPIDEMIA ASSOCIATED WITH TYPE 2 DIABETES MELLITUS: ICD-10-CM

## 2025-06-18 LAB
ABSOLUTE EOSINOPHIL (OHS): 0.28 K/UL
ABSOLUTE MONOCYTE (OHS): 0.7 K/UL (ref 0.3–1)
ABSOLUTE NEUTROPHIL COUNT (OHS): 3.17 K/UL (ref 1.8–7.7)
ALBUMIN SERPL BCP-MCNC: 3.6 G/DL (ref 3.5–5.2)
ALP SERPL-CCNC: 74 UNIT/L (ref 40–150)
ALT SERPL W/O P-5'-P-CCNC: 17 UNIT/L (ref 10–44)
ANION GAP (OHS): 8 MMOL/L (ref 8–16)
AST SERPL-CCNC: 16 UNIT/L (ref 11–45)
BASOPHILS # BLD AUTO: 0.06 K/UL
BASOPHILS NFR BLD AUTO: 0.8 %
BILIRUB SERPL-MCNC: 0.5 MG/DL (ref 0.1–1)
BUN SERPL-MCNC: 14 MG/DL (ref 6–20)
CALCIUM SERPL-MCNC: 9.5 MG/DL (ref 8.7–10.5)
CHLORIDE SERPL-SCNC: 105 MMOL/L (ref 95–110)
CHOLEST SERPL-MCNC: 170 MG/DL (ref 120–199)
CHOLEST/HDLC SERPL: 3.6 {RATIO} (ref 2–5)
CO2 SERPL-SCNC: 26 MMOL/L (ref 23–29)
CREAT SERPL-MCNC: 1 MG/DL (ref 0.5–1.4)
EAG (OHS): 131 MG/DL (ref 68–131)
ERYTHROCYTE [DISTWIDTH] IN BLOOD BY AUTOMATED COUNT: 14.6 % (ref 11.5–14.5)
GFR SERPLBLD CREATININE-BSD FMLA CKD-EPI: >60 ML/MIN/1.73/M2
GLUCOSE SERPL-MCNC: 114 MG/DL (ref 70–110)
HBA1C MFR BLD: 6.2 % (ref 4–5.6)
HCT VFR BLD AUTO: 49.3 % (ref 37–48.5)
HDLC SERPL-MCNC: 47 MG/DL (ref 40–75)
HDLC SERPL: 27.6 % (ref 20–50)
HGB BLD-MCNC: 14.7 GM/DL (ref 12–16)
IMM GRANULOCYTES # BLD AUTO: 0.02 K/UL (ref 0–0.04)
IMM GRANULOCYTES NFR BLD AUTO: 0.3 % (ref 0–0.5)
LDLC SERPL CALC-MCNC: 101 MG/DL (ref 63–159)
LYMPHOCYTES # BLD AUTO: 3.16 K/UL (ref 1–4.8)
MCH RBC QN AUTO: 25.4 PG (ref 27–31)
MCHC RBC AUTO-ENTMCNC: 29.8 G/DL (ref 32–36)
MCV RBC AUTO: 85 FL (ref 82–98)
NONHDLC SERPL-MCNC: 123 MG/DL
NUCLEATED RBC (/100WBC) (OHS): 0 /100 WBC
PLATELET # BLD AUTO: 283 K/UL (ref 150–450)
PMV BLD AUTO: 9.7 FL (ref 9.2–12.9)
POTASSIUM SERPL-SCNC: 5.5 MMOL/L (ref 3.5–5.1)
PROT SERPL-MCNC: 8.1 GM/DL (ref 6–8.4)
RBC # BLD AUTO: 5.79 M/UL (ref 4–5.4)
RELATIVE EOSINOPHIL (OHS): 3.8 %
RELATIVE LYMPHOCYTE (OHS): 42.8 % (ref 18–48)
RELATIVE MONOCYTE (OHS): 9.5 % (ref 4–15)
RELATIVE NEUTROPHIL (OHS): 42.8 % (ref 38–73)
SODIUM SERPL-SCNC: 139 MMOL/L (ref 136–145)
TRIGL SERPL-MCNC: 110 MG/DL (ref 30–150)
TSH SERPL-ACNC: 2.23 UIU/ML (ref 0.4–4)
WBC # BLD AUTO: 7.39 K/UL (ref 3.9–12.7)

## 2025-06-18 PROCEDURE — 85025 COMPLETE CBC W/AUTO DIFF WBC: CPT

## 2025-06-18 PROCEDURE — 84443 ASSAY THYROID STIM HORMONE: CPT

## 2025-06-18 PROCEDURE — 99214 OFFICE O/P EST MOD 30 MIN: CPT | Mod: S$PBB,,, | Performed by: FAMILY MEDICINE

## 2025-06-18 PROCEDURE — 84520 ASSAY OF UREA NITROGEN: CPT

## 2025-06-18 PROCEDURE — 36415 COLL VENOUS BLD VENIPUNCTURE: CPT | Mod: PO

## 2025-06-18 PROCEDURE — 99214 OFFICE O/P EST MOD 30 MIN: CPT | Mod: PBBFAC,PO | Performed by: FAMILY MEDICINE

## 2025-06-18 PROCEDURE — 99999 PR PBB SHADOW E&M-EST. PATIENT-LVL IV: CPT | Mod: PBBFAC,,, | Performed by: FAMILY MEDICINE

## 2025-06-18 PROCEDURE — 83036 HEMOGLOBIN GLYCOSYLATED A1C: CPT

## 2025-06-18 PROCEDURE — 80061 LIPID PANEL: CPT

## 2025-06-18 RX ORDER — PRAVASTATIN SODIUM 40 MG/1
40 TABLET ORAL DAILY
Qty: 90 TABLET | Refills: 3 | Status: SHIPPED | OUTPATIENT
Start: 2025-06-18

## 2025-06-18 NOTE — PROGRESS NOTES
Requested Diabetic Eye Exam.  HM and immunization's reviewed and updated.  Patient is due for Lipid, Diabetic Eye Exam.   Please help schedule or if already done please get information to get records.

## 2025-06-18 NOTE — PROGRESS NOTES
Subjective     Patient ID: Ava Driscoll is a 54 y.o. female.    Chief Complaint: Annual Exam    54-year-old female presents today for an annual exam.  She has hypertension and her blood pressure is very well controlled.  She has diabetes, but this is managed by Endocrinology, Dr. Goldstein.        History of Present Illness             Past Medical History:   Diagnosis Date    Behavioral problem     Colon polyps     Diabetes mellitus     History of psychiatric care     History of psychiatric hospitalization     Hx of psychiatric care     Loud snoring     Obese     Psychiatric problem     Retinopathy due to secondary diabetes     Schizophrenia     Severe anemia 2021    Therapy       Past Surgical History:   Procedure Laterality Date     SECTION      COLONOSCOPY N/A 2020    Procedure: COLONOSCOPY;  Surgeon: Edvin Zuniga II, MD;  Location: [facility] ENDO;  Service: Endoscopy;  Laterality: N/A;    COLONOSCOPY N/A 2025    Procedure: COLONOSCOPY;  Surgeon: Ele Cornelius MD;  Location: [facility] ENDO;  Service: Endoscopy;  Laterality: N/A;  referral: Dr. Kayla Varela, prep ins mailed - PEG - WB only due to transportation/ jardiance - hold x 3 days prior / trulicity - hold x 8 days prior - ERW  -r/r-dsng-kl-has prep  3/3/25- r/s due to transportation, has prep, instr mailed. DBM. 3/19 pt. called to say her GLP 1 h    HYSTERECTOMY       Family History   Problem Relation Name Age of Onset    Arthritis Mother      Diabetes type II Father      Breast cancer Maternal Aunt  80        unilat    Ovarian cancer Neg Hx       Social History[1]    Review of Systems   Constitutional:  Negative for chills, fatigue and fever.   Respiratory:  Negative for cough, chest tightness, shortness of breath and wheezing.    Cardiovascular:  Negative for chest pain, palpitations and leg swelling.   Gastrointestinal:  Negative for abdominal pain, blood in stool, diarrhea, nausea and vomiting.   Genitourinary:  Negative for  "dysuria, frequency and hematuria.   Integumentary:  Negative for rash.   Neurological:  Negative for dizziness, syncope and light-headedness.            Objective     Vitals:    06/18/25 0809   BP: 116/84   Pulse: 96   Temp: 99.4 °F (37.4 °C)   TempSrc: Oral   SpO2: 98%   Weight: 117.7 kg (259 lb 7.7 oz)   Height: 5' 4" (1.626 m)        Physical Exam  Constitutional:       General: She is not in acute distress.     Appearance: Normal appearance. She is well-developed. She is obese. She is not ill-appearing, toxic-appearing or diaphoretic.   HENT:      Head: Normocephalic and atraumatic.      Right Ear: External ear normal.      Left Ear: External ear normal.      Mouth/Throat:      Pharynx: No oropharyngeal exudate.   Eyes:      Conjunctiva/sclera: Conjunctivae normal.   Neck:      Thyroid: No thyromegaly.   Cardiovascular:      Rate and Rhythm: Normal rate and regular rhythm.      Heart sounds: Normal heart sounds. No murmur heard.     No friction rub. No gallop.   Pulmonary:      Effort: Pulmonary effort is normal. No respiratory distress.      Breath sounds: Normal breath sounds. No stridor. No wheezing, rhonchi or rales.   Abdominal:      General: Bowel sounds are normal. There is no distension.      Palpations: Abdomen is soft. There is no mass.      Tenderness: There is no abdominal tenderness. There is no guarding or rebound.      Hernia: No hernia is present.   Musculoskeletal:      Cervical back: Normal range of motion and neck supple.   Lymphadenopathy:      Cervical: No cervical adenopathy.   Skin:     Findings: No rash.   Neurological:      Mental Status: She is alert.   Psychiatric:         Mood and Affect: Mood normal.       Physical Exam                Assessment and Plan     1. Type 2 diabetes mellitus with hyperglycemia, without long-term current use of insulin  -     Hemoglobin A1C; Future; Expected date: 06/18/2025  -     TSH; Future; Expected date: 06/18/2025  -     Lipid Panel; Future; Expected " date: 12/18/2025  -     Comprehensive Metabolic Panel; Future; Expected date: 06/18/2025  -     CBC Auto Differential; Future; Expected date: 06/18/2025  Stable. Refilled meds and due for labs    2. Hyperlipidemia associated with type 2 diabetes mellitus  -     Hemoglobin A1C; Future; Expected date: 06/18/2025  -     TSH; Future; Expected date: 06/18/2025  -     Lipid Panel; Future; Expected date: 12/18/2025  -     Comprehensive Metabolic Panel; Future; Expected date: 06/18/2025  -     CBC Auto Differential; Future; Expected date: 06/18/2025  -     pravastatin (PRAVACHOL) 40 MG tablet; Take 1 tablet (40 mg total) by mouth once daily.  Dispense: 90 tablet; Refill: 3  Stable. Refilled meds and due for labs      Ava was seen today for annual exam.    Diagnoses and all orders for this visit:    Type 2 diabetes mellitus with hyperglycemia, without long-term current use of insulin  -     Hemoglobin A1C; Future  -     TSH; Future  -     Lipid Panel; Future  -     Comprehensive Metabolic Panel; Future  -     CBC Auto Differential; Future    Hyperlipidemia associated with type 2 diabetes mellitus  -     Hemoglobin A1C; Future  -     TSH; Future  -     Lipid Panel; Future  -     Comprehensive Metabolic Panel; Future  -     CBC Auto Differential; Future  -     pravastatin (PRAVACHOL) 40 MG tablet; Take 1 tablet (40 mg total) by mouth once daily.        Assessment & Plan                      No follow-ups on file.        This note was generated with the assistance of ambient listening technology. Verbal consent was obtained by the patient and accompanying visitor(s) for the recording of patient appointment to facilitate this note. I attest to having reviewed and edited the generated note for accuracy, though some syntax or spelling errors may persist. Please contact the author of this note for any clarification.         [1]   Social History  Socioeconomic History    Marital status: Single    Number of children: 2   Tobacco  Use    Smoking status: Former     Types: Cigarettes    Smokeless tobacco: Never   Substance and Sexual Activity    Alcohol use: No    Drug use: Never     Social Drivers of Health     Financial Resource Strain: High Risk (5/27/2025)    Overall Financial Resource Strain (CARDIA)     Difficulty of Paying Living Expenses: Hard   Food Insecurity: Food Insecurity Present (5/27/2025)    Hunger Vital Sign     Worried About Running Out of Food in the Last Year: Often true     Ran Out of Food in the Last Year: Sometimes true   Transportation Needs: Unmet Transportation Needs (5/27/2025)    PRAPARE - Transportation     Lack of Transportation (Medical): No     Lack of Transportation (Non-Medical): Yes   Physical Activity: Inactive (5/27/2025)    Exercise Vital Sign     Days of Exercise per Week: 0 days     Minutes of Exercise per Session: 0 min   Stress: No Stress Concern Present (5/27/2025)    [employer] - Occupational Stress Questionnaire     Feeling of Stress : Not at all   Housing Stability: Low Risk  (5/27/2025)    Housing Stability Vital Sign     Unable to Pay for Housing in the Last Year: No     Number of Times Moved in the Last Year: 0     Homeless in the Last Year: No

## 2025-06-18 NOTE — LETTER
AUTHORIZATION FOR RELEASE OF   CONFIDENTIAL INFORMATION        We are seeing Ava Driscoll, date of birth 1970, in the clinic at [facility]. Kayla Varela MD is the patient's PCP. Ava Driscoll has an outstanding lab/procedure at the time we reviewed her chart. In order to help keep her health information updated, she has authorized us to request the following medical record(s):        (  )  MAMMOGRAM                                      (  )  COLONOSCOPY      (  )  PAP SMEAR                                          (  )  OUTSIDE LAB RESULTS     (  )  DEXA SCAN                                          ( x )  DIABETIC EYE EXAM            (  )  FOOT EXAM                                          (  )  ENTIRE RECORD     (  )  OUTSIDE IMMUNIZATIONS                 (  )  _______________        Please fax records to [facility], Rodgers, Shari J., MD  [address] [place] [address], [facility] [facility].    Fax number is [phone].    If you have any questions, please contact, Lamar Williamson MA Care Coordinator at [phone]         Patient Name: Ava Driscoll  : 1970  Patient Phone #: [phone]

## 2025-06-20 ENCOUNTER — TELEPHONE (OUTPATIENT)
Dept: FAMILY MEDICINE | Facility: CLINIC | Age: 55
End: 2025-06-20
Payer: MEDICARE

## 2025-06-20 NOTE — TELEPHONE ENCOUNTER
Copied from CRM #[id number]. Topic: General Inquiry - Patient Advice  >> Jun 20, 2025  2:14 PM Med Assistant Kendy wrote:  Type: Patient Call Back    Who called:Pt     What is the request in detail:  Dr Gomez office needs a medical request form completed to send her records  Can the clinic reply by [facility]?    Would the patient rather a call back or a response via [facility]? Callback   Callback   Best call back number :Telephone Information:  Mobile          [phone]       Additional Information:Fax :[phone]

## 2025-06-23 ENCOUNTER — PATIENT OUTREACH (OUTPATIENT)
Dept: ADMINISTRATIVE | Facility: HOSPITAL | Age: 55
End: 2025-06-23
Payer: MEDICARE

## 2025-06-23 NOTE — LETTER
AUTHORIZATION FOR RELEASE OF   CONFIDENTIAL INFORMATION        We are seeing Ava Driscoll, date of birth 1970, in the clinic at [facility]. Kayla Varela MD is the patient's PCP. Ava Driscoll has an outstanding lab/procedure at the time we reviewed her chart. In order to help keep her health information updated, she has authorized us to request the following medical record(s):                                               ( X )  EYE EXAM           Please fax records to [facility], Rodgers, Shari J., MD , [address], [place] [address].    Fax number [phone]. Email: [email or web address].    If you have any questions, please contact, Tyesha FUENTES Care Coordinator at [phone].          Patient Name: Ava Driscoll  : 1970  Patient Phone #: [phone]     Ava Driscoll  MRN: [id number]  : 1970  Age: 54 y.o.  Sex: female         Patient/Legal Guardian Signature  This signature was collected at 2024           _______________________________   Printed Name/Relationship to Patient      Consent for Examination and Treatment: I hereby authorize the providers and employees of [facility] ([facility]) to provide medical treatment/services which includes, but is not limited to, performing and administering tests and diagnostic procedures that are deemed necessary, including, but not limited to, imaging examinations, blood tests and other laboratory procedures as may be required by the hospital, clinic, or may be ordered by my physician(s) or persons working under the general and/or special instructions of my physician(s).      I understand and agree that this consent covers all authorized persons, including but not limited to physicians, residents, nurse practitioners, physicians' assistants, specialists, consultants, student nurses, and independently contracted physicians, who are called upon by the physician in charge, to carry out the  diagnostic procedures and medical or surgical treatment.     I hereby authorize [facility] to retain or dispose of any specimens or tissue, should there be such remaining from any test or procedure.     I hereby authorize and give consent for [facility] providers and employees to take photographs, images or videotapes of such diagnostic, surgical or treatment procedures of Patient as may be required by [facility] or as may be ordered by a physician. I further acknowledge and agree that [facility] may use cameras or other devices for patient monitoring.     I am aware that the practice of medicine is not an exact science, and I acknowledge that no guarantees have been made to me as to the outcome of any tests, procedures or treatment.     Authorization for Release of Information: I understand that my insurance company and/or their agents may need information necessary to make determinations about payment/reimbursement. I hereby provide authorization to release to all insurance companies, their successors, assignees, other parties with whom they may have contracted, or others acting on their behalf, that are involved with payment for any hospital and/or clinic charges incurred by the patient, any information that they request and deem necessary for payment/reimbursement, and/or quality review.  I further authorize the release of my health information to physicians or other health care practitioners on staff who are involved in my health care now and in the future, and to other health care providers, entities, or institutions for the purpose of my continued care and treatment, including referrals.     REGISTRATION AUTHORIZATION  Form No. 92089 (Rev. 3/25/2024)    Page 1 of 3                       Medicare Patient's Certification and Authorization to Release Information and Payment Request:  I certify that the information given by me in applying for payment under Title XVIII of the Social Security Act is correct. I authorize any  barton of medical or other information about me to release to the [facility], or its intermediaries or carriers, any information needed for this or a related Medicare claim. I request that payment of authorized benefits be made on my behalf.     Assignment of Insurance Benefits:   I hereby authorize any and all insurance companies, health plans, defined   benefit plans, health insurers or any entity that is or may be responsible for payment of my medical expenses to pay all hospital and medical benefits now due, and to become due and payable to me under any hospital benefits, sick benefits, injury benefits or any other benefit for services rendered to me, including Major Medical Benefits, direct to [facility] and all independently contracted physicians. I assign any and all rights that I may have against any and all insurance companies, health plans, defined benefit plans, health insurers or any entity that is or may be responsible for payment of my medical expenses, including, but not limited to any right to appeal a denial of a claim, any right to bring any action, lawsuit, administrative proceeding, or other cause of action on my behalf. I specifically assign my right to pursue litigation against any and all insurance companies, health plans, defined benefit plans, health insurers or any entity that is or may be responsible for payment of my medical expenses based upon a refusal to pay charges.            E. Valuables: It is understood and agreed that [facility] is not liable for the damage to or loss of any money, jewelry,   documents, dentures, eye glasses, hearing aids, prosthetics, or other property of value.     F. Computer Equipment: I understand and agree that should I choose to use computer equipment owned by [facility] or if I choose to access the Internet via [facility]s network, I do so at my own risk. [facility] is not responsible for any damage to my computer equipment or to any damages of any  type that might arise from my loss of equipment or data.     G. Acceptance of Financial Responsibility:  I agree that in consideration of the services and   supplies that have been   or will be furnished to the patient, I am hereby obligated to pay all charges made for or on the account of the patient according to the standard rates (in effect at the time the services and supplies are delivered) established by [facility], including its Patient Financial Assistance Policy to the extent it is applicable. I understand that I am responsible for all charges, or portions thereof, not covered by insurance or other sources. Patient refunds will be distributed only after balances at all [facility] facilities are paid.     H. Communication Authorization:  I hereby authorize [facility] and its representatives, along with any billing service   or  who may work on their behalf, to contact me on   my cell phone and/or home phone using pre- recorded messages, artificial voice messages, automatic telephone dialing devices or other computer assisted technology, or by electronic      mail, text messaging, or by any other form of electronic communication. This includes, but is not limited to, appointment reminders, yearly physical exam reminders, preventive care reminders, patient campaigns, welcome calls, and calls about account balances on my account or any account on which I am listed as a guarantor. I understand I have the right to opt out of these communications at any time.      Relationship  Between  Facility and  Provider:      I understand that some, but not all, providers furnishing services to the patient are not employees or agents of [facility]. The patient is under the care and supervision of his/her attending physician, and it is the responsibility of the facility and its nursing staff to carry out the instructions of such physicians. It is the responsibility of the patient's physician/designee to obtain the  patient's informed consent, when required, for medical or surgical treatment, special diagnostic or therapeutic procedures, or hospital services rendered for the patient under the special instructions of the physician/designee.           REGISTRATION AUTHORIZATION  Form No. 31291 (Rev. 3/25/2024)    Page 2 of 3                       Immunizations: [facility] shares immunization information with state sponsored health departments to help you and your doctor keep track of your immunization records. By signing, you consent to have this information shared with the health department in your state:                                [place] - LINKS ([place] Immunization Network for Kids Statewide)                                [place] - MIIX ([place] Immunization Information eXchange)                                [place] - ImmPRINT (Immunization Patient Registry with Integrated Technology)     TERM: This authorization is valid for this and subsequent care/treatment I receive at [facility] and will remain valid unless/until revoked in writing by me.     [facility]: As used in this document, [facility] means all [facility] owned and managed facilities, including, but not limited to, all health centers, surgery centers, clinics, urgent care centers, and hospitals.         [facility] complies with applicable Federal civil rights laws and does not discriminate on the basis of race, color, national origin, age, disability, or sex.  ATENCIÓN: si habla vic, tiene a calhoun disposición servicios gratuitos de asistencia lingüística. Ryan al [phone].  [facility] Ý: N?u b?n nói Ti?ng Vi?t, có các d?ch v? h? tr? ngôn ng? mi?n phí dành cho b?n. G?i s? [phone].        REGISTRATION AUTHORIZATION  Form No. 57319 (Rev. 3/25/2024)   Page 3 of 3

## 2025-06-23 NOTE — PROGRESS NOTES
HM and immunization's reviewed and updated.    Health Maintenance Due   Topic Date Due    TETANUS VACCINE  Never done    COVID-19 Vaccine (4 - 2024-25 season) 09/01/2024    Foot Exam  08/07/2025      Receive message from staff regarding needing [facility] for [facility] send records. Efax ROSIE along with clinic authorization form.

## 2025-06-24 ENCOUNTER — RESULTS FOLLOW-UP (OUTPATIENT)
Dept: FAMILY MEDICINE | Facility: CLINIC | Age: 55
End: 2025-06-24

## 2025-08-07 ENCOUNTER — LAB VISIT (OUTPATIENT)
Dept: LAB | Facility: HOSPITAL | Age: 55
End: 2025-08-07
Attending: HOSPITALIST
Payer: MEDICARE

## 2025-08-07 DIAGNOSIS — E55.9 VITAMIN D DEFICIENCY: ICD-10-CM

## 2025-08-07 DIAGNOSIS — E11.65 TYPE 2 DIABETES MELLITUS WITH HYPERGLYCEMIA, WITHOUT LONG-TERM CURRENT USE OF INSULIN: ICD-10-CM

## 2025-08-07 LAB
ANION GAP (OHS): 7 MMOL/L (ref 8–16)
BUN SERPL-MCNC: 17 MG/DL (ref 6–20)
CALCIUM SERPL-MCNC: 9.1 MG/DL (ref 8.7–10.5)
CHLORIDE SERPL-SCNC: 106 MMOL/L (ref 95–110)
CO2 SERPL-SCNC: 28 MMOL/L (ref 23–29)
CREAT SERPL-MCNC: 0.9 MG/DL (ref 0.5–1.4)
EAG (OHS): 131 MG/DL (ref 68–131)
GFR SERPLBLD CREATININE-BSD FMLA CKD-EPI: >60 ML/MIN/1.73/M2
GLUCOSE SERPL-MCNC: 85 MG/DL (ref 70–110)
HBA1C MFR BLD: 6.2 % (ref 4–5.6)
POTASSIUM SERPL-SCNC: 4.5 MMOL/L (ref 3.5–5.1)
SODIUM SERPL-SCNC: 141 MMOL/L (ref 136–145)

## 2025-08-07 PROCEDURE — 36415 COLL VENOUS BLD VENIPUNCTURE: CPT

## 2025-08-07 PROCEDURE — 83036 HEMOGLOBIN GLYCOSYLATED A1C: CPT

## 2025-08-07 PROCEDURE — 82306 VITAMIN D 25 HYDROXY: CPT

## 2025-08-07 PROCEDURE — 80048 BASIC METABOLIC PNL TOTAL CA: CPT

## 2025-08-08 LAB — 25(OH)D3+25(OH)D2 SERPL-MCNC: 26 NG/ML (ref 30–96)

## 2025-08-11 DIAGNOSIS — E11.65 TYPE 2 DIABETES MELLITUS WITH HYPERGLYCEMIA, WITHOUT LONG-TERM CURRENT USE OF INSULIN: ICD-10-CM

## 2025-08-12 ENCOUNTER — TELEPHONE (OUTPATIENT)
Dept: ENDOCRINOLOGY | Facility: CLINIC | Age: 55
End: 2025-08-12
Payer: MEDICARE

## 2025-08-12 RX ORDER — TIRZEPATIDE 5 MG/.5ML
5 INJECTION, SOLUTION SUBCUTANEOUS
Qty: 6 ML | Refills: 1 | Status: SHIPPED | OUTPATIENT
Start: 2025-08-12

## 2025-08-13 ENCOUNTER — OFFICE VISIT (OUTPATIENT)
Dept: ENDOCRINOLOGY | Facility: CLINIC | Age: 55
End: 2025-08-13
Payer: MEDICARE

## 2025-08-13 VITALS
DIASTOLIC BLOOD PRESSURE: 86 MMHG | BODY MASS INDEX: 44.18 KG/M2 | WEIGHT: 257.38 LBS | HEART RATE: 72 BPM | SYSTOLIC BLOOD PRESSURE: 126 MMHG

## 2025-08-13 DIAGNOSIS — I10 ESSENTIAL HYPERTENSION: ICD-10-CM

## 2025-08-13 DIAGNOSIS — E11.69 HYPERLIPIDEMIA ASSOCIATED WITH TYPE 2 DIABETES MELLITUS: ICD-10-CM

## 2025-08-13 DIAGNOSIS — E53.8 VITAMIN B12 DEFICIENCY: ICD-10-CM

## 2025-08-13 DIAGNOSIS — E11.65 TYPE 2 DIABETES MELLITUS WITH HYPERGLYCEMIA, WITHOUT LONG-TERM CURRENT USE OF INSULIN: Primary | ICD-10-CM

## 2025-08-13 DIAGNOSIS — F20.0 PARANOID SCHIZOPHRENIA: ICD-10-CM

## 2025-08-13 DIAGNOSIS — E66.813 CLASS 3 SEVERE OBESITY DUE TO EXCESS CALORIES WITH SERIOUS COMORBIDITY AND BODY MASS INDEX (BMI) OF 40.0 TO 44.9 IN ADULT: ICD-10-CM

## 2025-08-13 DIAGNOSIS — E55.9 VITAMIN D DEFICIENCY: ICD-10-CM

## 2025-08-13 DIAGNOSIS — E78.5 HYPERLIPIDEMIA ASSOCIATED WITH TYPE 2 DIABETES MELLITUS: ICD-10-CM

## 2025-08-13 PROCEDURE — G2211 COMPLEX E/M VISIT ADD ON: HCPCS | Mod: ,,, | Performed by: HOSPITALIST

## 2025-08-13 PROCEDURE — 99214 OFFICE O/P EST MOD 30 MIN: CPT | Mod: PBBFAC | Performed by: HOSPITALIST

## 2025-08-13 PROCEDURE — 99999 PR PBB SHADOW E&M-EST. PATIENT-LVL IV: CPT | Mod: PBBFAC,,, | Performed by: HOSPITALIST

## 2025-08-13 PROCEDURE — 99214 OFFICE O/P EST MOD 30 MIN: CPT | Mod: S$PBB,,, | Performed by: HOSPITALIST

## 2025-08-13 RX ORDER — METFORMIN HYDROCHLORIDE 1000 MG/1
1000 TABLET ORAL
Qty: 90 TABLET | Refills: 3 | Status: SHIPPED | OUTPATIENT
Start: 2025-08-13

## 2025-08-13 RX ORDER — ERGOCALCIFEROL 1.25 MG/1
50000 CAPSULE ORAL
Qty: 12 CAPSULE | Refills: 3 | Status: CANCELLED | OUTPATIENT
Start: 2025-08-13

## 2025-09-02 ENCOUNTER — TELEPHONE (OUTPATIENT)
Dept: HEMATOLOGY/ONCOLOGY | Facility: CLINIC | Age: 55
End: 2025-09-02
Payer: MEDICARE